# Patient Record
Sex: MALE | Race: WHITE | NOT HISPANIC OR LATINO | Employment: OTHER | URBAN - METROPOLITAN AREA
[De-identification: names, ages, dates, MRNs, and addresses within clinical notes are randomized per-mention and may not be internally consistent; named-entity substitution may affect disease eponyms.]

---

## 2019-08-13 ENCOUNTER — OFFICE VISIT (OUTPATIENT)
Dept: CARDIOLOGY CLINIC | Facility: CLINIC | Age: 69
End: 2019-08-13
Payer: COMMERCIAL

## 2019-08-13 VITALS
BODY MASS INDEX: 24.27 KG/M2 | WEIGHT: 151 LBS | HEIGHT: 66 IN | HEART RATE: 68 BPM | DIASTOLIC BLOOD PRESSURE: 62 MMHG | SYSTOLIC BLOOD PRESSURE: 130 MMHG

## 2019-08-13 DIAGNOSIS — I10 ESSENTIAL HYPERTENSION: ICD-10-CM

## 2019-08-13 DIAGNOSIS — R06.02 SOB (SHORTNESS OF BREATH): Primary | ICD-10-CM

## 2019-08-13 DIAGNOSIS — R01.1 CARDIAC MURMUR, UNSPECIFIED: ICD-10-CM

## 2019-08-13 PROCEDURE — 99204 OFFICE O/P NEW MOD 45 MIN: CPT | Performed by: INTERNAL MEDICINE

## 2019-08-13 PROCEDURE — 93000 ELECTROCARDIOGRAM COMPLETE: CPT | Performed by: INTERNAL MEDICINE

## 2019-08-13 RX ORDER — AMLODIPINE BESYLATE 10 MG/1
10 TABLET ORAL
COMMUNITY
End: 2020-05-18 | Stop reason: SDUPTHER

## 2019-08-13 RX ORDER — FERROUS SULFATE 325(65) MG
325 TABLET ORAL
COMMUNITY

## 2019-08-13 RX ORDER — GABAPENTIN 100 MG/1
100 CAPSULE ORAL
COMMUNITY
End: 2022-02-17 | Stop reason: ALTCHOICE

## 2019-08-13 NOTE — ASSESSMENT & PLAN NOTE
Blood pressure is well controlled on current medical regimen with amlodipine 10 mg daily     - Patient is advised to continue current medical therapy  - Dietary and medical compliance are reinforced  - Advised  to report any concerning symptoms such as chest pain, shortness of breath, decline in exercise tolerance or presyncope/syncope

## 2019-08-13 NOTE — ASSESSMENT & PLAN NOTE
Patient's etiology of shortness of breath is likely multifactorial, primarily likely secondary to his chronic smoking and possible developing COPD  He denies symptoms suggestive of angina  He does have a cardiac murmur suggestive of mitral regurgitation and some degree of aortic valve sclerosis or early stenosis  There are no signs and symptoms is suggestive of left ventricular systolic heart failure  He does have multiple risk factors including smoking, hypertension, family history of premature CAD  - we will arrange for transthoracic echocardiogram to assess cardiac structure and function  - we will request for a repeat blood work including a CMP, lipid profile and TSH and a NT proBNP level  These can be performed at the [place] or at [facility]  - I have advised him to cut down on smoking and possible quit smoking however he believes it is hard to consider at this time  - I am also advising him to keep a diary of symptoms of shortness of breath  If he has decline in exercise tolerance or a shortness of breath becomes worse then will have to consider excluding ischemic heart disease   - the the he will need consideration for evaluation for developing COPD as clinically indicated

## 2019-08-13 NOTE — PATIENT INSTRUCTIONS
Shortness of breath on exertion  Patient's etiology of shortness of breath is likely multifactorial, primarily likely secondary to his chronic smoking and possible developing COPD  He denies symptoms suggestive of angina  He does have a cardiac murmur suggestive of mitral regurgitation and some degree of aortic valve sclerosis or early stenosis  There are no signs and symptoms is suggestive of left ventricular systolic heart failure  He does have multiple risk factors including smoking, hypertension, family history of premature CAD  - we will arrange for transthoracic echocardiogram to assess cardiac structure and function  - we will request for a repeat blood work including a CMP, lipid profile and TSH and a NT proBNP level  These can be performed at the [place] or at [facility]  - I have advised him to cut down on smoking and possible quit smoking however he believes it is hard to consider at this time  - I am also advising him to keep a diary of symptoms of shortness of breath  If he has decline in exercise tolerance or a shortness of breath becomes worse then will have to consider excluding ischemic heart disease   - the the he will need consideration for evaluation for developing COPD as clinically indicated  Hypertension  Blood pressure is well controlled on current medical regimen with amlodipine 10 mg daily     - Patient is advised to continue current medical therapy  - Dietary and medical compliance are reinforced  - Advised  to report any concerning symptoms such as chest pain, shortness of breath, decline in exercise tolerance or presyncope/syncope

## 2019-08-13 NOTE — PROGRESS NOTES
[facility]  [id number] [facility], [place] [address]  Phone#  [phone]  Fax#  [phone]  *-*-*-*-*-*-*-*-*-*-*-*-*-*-*-*-*-*-*-*-*-*-*-*-*-*-*-*-*-*-*-*-*-*-*-*-*-*-*-*-*-*-*-*-*-*-*-*-*-*-*-*-*-*    ENCOUNTER DATE: 08/13/19 3:33 PM  PATIENT NAME: Joana Chamorro   1950    [id number]  Age: 71 y o  Sex: male  AUTHOR: Adriana Bhandari MD  [facility] PHYSICIAN: No primary care provider on file  REFERRING PHYSICIAN: Bula Gaucher, MD; [address] [place] [address]  *-*-*-*-*-*-*-*-*-*-*-*-*-*-*-*-*-*-*-*-*-*-*-*-*-*-*-*-*-*-*-*-*-*-*-*-*-*-*-*-*-*-*-*-*-*-*-*-*-*-*-*-*-*-  REASON FOR REFERRAL:  EVALUATION OF SHORTNESS OF BREATH    *-*-*-*-*-*-*-*-*-*-*-*-*-*-*-*-*-*-*-*-*-*-*-*-*-*-*-*-*-*-*-*-*-*-*-*-*-*-*-*-*-*-*-*-*-*-*-*-*-*-*-*-*-*-  CARDIOLOGY ASSESSMENT & PLAN:  Shortness of breath on exertion  Patient's etiology of shortness of breath is likely multifactorial, primarily likely secondary to his chronic smoking and possible developing COPD  He denies symptoms suggestive of angina  He does have a cardiac murmur suggestive of mitral regurgitation and some degree of aortic valve sclerosis or early stenosis  There are no signs and symptoms is suggestive of left ventricular systolic heart failure  He does have multiple risk factors including smoking, hypertension, family history of premature CAD  - we will arrange for transthoracic echocardiogram to assess cardiac structure and function  - we will request for a repeat blood work including a CMP, lipid profile and TSH and a NT proBNP level  These can be performed at the [facility] or at [facility]  - I have advised him to cut down on smoking and possible quit smoking however he believes it is hard to consider at this time  - I am also advising him to keep a diary of symptoms of shortness of breath    If he has decline in exercise tolerance or a shortness of breath becomes worse then will have to consider excluding ischemic heart disease   - the the he will need consideration for evaluation for developing COPD as clinically indicated  Hypertension  Blood pressure is well controlled on current medical regimen with amlodipine 10 mg daily     - Patient is advised to continue current medical therapy  - Dietary and medical compliance are reinforced  - Advised  to report any concerning symptoms such as chest pain, shortness of breath, decline in exercise tolerance or presyncope/syncope  *-*-*-*-*-*-*-*-*-*-*-*-*-*-*-*-*-*-*-*-*-*-*-*-*-*-*-*-*-*-*-*-*-*-*-*-*-*-*-*-*-*-*-*-*-*-*-*-*-*-*-*-*-*-  CURRENT ECG:  Results for orders placed or performed in visit on 08/13/19   POCT ECG    Narrative    Sinus rhythm, HR 68 beats per minute, normal axis and intervals, nonspecific ST T-wave abnormalities  *-*-*-*-*-*-*-*-*-*-*-*-*-*-*-*-*-*-*-*-*-*-*-*-*-*-*-*-*-*-*-*-*-*-*-*-*-*-*-*-*-*-*-*-*-*-*-*-*-*-*-*-*-*-  HISTORY OF PRESENT ILLNESS:  Patient is a pleasant 41-year-old  gentleman with medical history significant for:  1  History of hypertension  2  History of prostrate CA and BPH  3  History of anemia  4  History of lung nodules and  5  History of chronic tobacco abuse  6  Remote history of rib fracture on the left side    He has been referred for evaluation of shortness of breath which he has had for over a year  He states that he has been short of breath with mild-to-moderate exertion but has had no symptoms at rest   There are no aggravating factors except for exertion  There is no associated symptom of typical chest pain, dizziness, lightheadedness, presyncope/syncope  He does have intermittent lower extremity claudication for which he is followed by vascular Physicians at [address] [place] [address]  He was recently seen by them in July 2019 and medical management has been recommended  Patient denies any orthopnea, PND or worsening pedal edema    His exercise tolerance is limited due to his chronic smoking and he does recent at times  He has never been diagnosed with COPD and is not on bronchodilator therapy  He mentions that he had and echocardiogram at the [facility] and his left ventricular function at that time was reported as normal with EF of around 60%  He has had no recent hospitalizations or other illnesses  *-*-*-*-*-*-*-*-*-*-*-*-*-*-*-*-*-*-*-*-*-*-*-*-*-*-*-*-*-*-*-*-*-*-*-*-*-*-*-*-*-*-*-*-*-*-*-*-*-*-*-*-*-*  PAST MEDICAL HISTORY:   Past Medical History:   Diagnosis Date    Anemia     Cancer ([facility] [address] )     prostate    Hypertension     Lung nodules     unsure of dx    PVD [facility]peripheral vascular disease) ([facility] )     Tobacco abuse        PAST SURGICAL HISTORY:   Past Surgical History:   Procedure Laterality Date    HERNIA REPAIR      PROSTATECTOMY         FAMILY HISTORY:  No family history on file  His son passed away due to sudden cardiac death at the age of 40 years  SOCIAL HISTORY:  @[facility]@  Social History     Tobacco Use   Smoking Status Current Every Day Smoker     Social History     Substance and Sexual Activity   Alcohol Use Yes    Comment: social     Social History     Substance and Sexual Activity   Drug Use No     He retired from construction work  He lives with his wife and they take care of their son who is now 28years old and has been quadriplegic for 5 years following accident and spinal injury  He has been a chronic smoker since the age of 13 years and smokes over a pack a day  He drinks a pack of beer a week and and hard liquor occasionally  Denies any history of illicit drug use      *-*-*-*-*-*-*-*-*-*-*-*-*-*-*-*-*-*-*-*-*-*-*-*-*-*-*-*-*-*-*-*-*-*-*-*-*-*-*-*-*-*-*-*-*-*-*-*-*-*-*-*-*-*  ALLERGIES:  Allergies   Allergen Reactions    Aspirin Other (See Comments)     Polyps in sinuses     Penicillins Rash    Sulfa Antibiotics Rash     *-*-*-*-*-*-*-*-*-*-*-*-*-*-*-*-*-*-*-*-*-*-*-*-*-*-*-*-*-*-*-*-*-*-*-*-*-*-*-*-*-*-*-*-*-*-*-*-*-*-*-*-*-*  CURRENT OUTPATIENT MEDICATIONS: Current Outpatient Medications:     amLODIPine (NORVASC) 10 mg tablet, Take 10 mg by mouth, Disp: , Rfl:     Cholecalciferol 1000 units CHEW, Chew, Disp: , Rfl:     cyanocobalamin 500 MCG tablet, Take 500 mcg by mouth daily, Disp: , Rfl:     ferrous sulfate 325 (65 Fe) mg tablet, Take by mouth daily, Disp: , Rfl:     gabapentin (NEURONTIN) 100 mg capsule, Take 100 mg by mouth, Disp: , Rfl:     *-*-*-*-*-*-*-*-*-*-*-*-*-*-*-*-*-*-*-*-*-*-*-*-*-*-*-*-*-*-*-*-*-*-*-*-*-*-*-*-*-*-*-*-*-*-*-*-*-*-*-*-*-*  REVIEW OF SYMPTOMS:    Positive for:  Chronic shortness of breath with mild-to-moderate exertion  Negative for: All remaining as reviewed below and in HPI  SYSTEM SYMPTOMS REVIEWED:  General--weight change, fever, night sweats  Respirato  Cardiovascular--chest pain, syncope, dyspnea on exertion, edema, decline in exercise tolerance, claudication   Gastrointestinal--persistent vomiting, diarrhea, abdominal distention, blood in stool   Muscular or skeletal--joint pain or swelling   Neurologic--headaches, syncope, abnormal movement  Hematologic--history of easy bruising and bleeding   Endocrine--thyroid enlargement, heat or cold intolerance, polyuria   Psychiatric--anxiety, depression     *-*-*-*-*-*-*-*-*-*-*-*-*-*-*-*-*-*-*-*-*-*-*-*-*-*-*-*-*-*-*-*-*-*-*-*-*-*-*-*-*-*-*-*-*-*-*-*-*-*-*-*-*-*-  VITAL SIGNS:  Vitals:    08/13/19 1451   BP: 130/62   BP Location: Right arm   Patient Position: Sitting   Cuff Size: Adult   Pulse: 68   Weight: 68 5 kg (151 lb)   Height: 5' 6" (1 676 m)       *-*-*-*-*-*-*-*-*-*-*-*-*-*-*-*-*-*-*-*-*-*-*-*-*-*-*-*-*-*-*-*-*-*-*-*-*-*-*-*-*-*-*-*-*-*-*-*-*-*-*-*-*-*-  PHYSICAL EXAM:  General Appearance:    Alert, cooperative, no distress, appears stated age   Head, Eyes, ENT:    No obvious abnormality, moist mucous mebranes  Has heavy tobacco breath   Neck:   Supple, no carotid bruit or JVD   Back:     Symmetric, no curvature  Lungs:     Respirations unlabored   Clear to auscultation bilaterally,    Chest wall:    No tenderness or deformity   Heart:    Regular rate and rhythm, S1 and S2 normal,  2/6 holosystolic murmur is appreciable at left upper sternal border and at the apex, no rub  or gallop noted  Abdomen:     Soft, non-tender, No obvious masses, or organomegaly   Extremities:   Extremities normal, no cyanosis or edema   Skin:   Skin color, texture, turgor normal, no rashes or lesions     *-*-*-*-*-*-*-*-*-*-*-*-*-*-*-*-*-*-*-*-*-*-*-*-*-*-*-*-*-*-*-*-*-*-*-*-*-*-*-*-*-*-*-*-*-*-*-*-*-*-*-*-*-*-  LABORATORY DATA:  I have personally reviewed pertinent labs  His blood work from the [employer] is noted    In May 2019 his renal function is noted as creatinine of 1 26, GFR of 57, normal LFTs, borderline low potassium of 3 5, TSH normal of 2 004    Potassium   Date Value Ref Range Status   05/07/2016 4 3 3 5 - 5 3 mmol/L Final   05/06/2016 3 1 (L) 3 5 - 5 3 mmol/L Final   05/05/2016 3 5 3 5 - 5 3 mmol/L Final     Comment:     normal range     Chloride   Date Value Ref Range Status   05/07/2016 103 100 - 108 mmol/L Final   05/06/2016 100 100 - 108 mmol/L Final   05/05/2016 104 100 - 108 mmol/L Final     CO2   Date Value Ref Range Status   05/07/2016 26 21 - 32 mmol/L Final   05/06/2016 30 21 - 32 mmol/L Final   05/05/2016 15 (L) 21 - 32 mmol/L Final     BUN   Date Value Ref Range Status   05/07/2016 33 (H) 5 - 25 mg/dL Final   05/06/2016 55 (H) 5 - 25 mg/dL Final   05/05/2016 78 (H) 5 - 25 mg/dL Final     Creatinine   Date Value Ref Range Status   05/07/2016 1 30 0 60 - 1 30 mg/dL Final     Comment:     Standardized to IDMS reference method   05/06/2016 1 65 (H) 0 60 - 1 30 mg/dL Final     Comment:     Standardized to IDMS reference method   05/05/2016 3 10 (H) 0 60 - 1 30 mg/dL Final     Comment:     Standardized to IDMS reference method     eGFR   Date Value Ref Range Status   05/07/2016 55 2 ml/min/1 73sq m Final   05/06/2016 41 9 ml/min/1 73sq m Final   05/05/2016 20 3 ml/min/1 73sq m Final     Calcium   Date Value Ref Range Status   05/07/2016 7 4 (L) 8 3 - 10 1 mg/dL Final   05/06/2016 6 9 (L) 8 3 - 10 1 mg/dL Final   05/05/2016 7 3 (L) 8 3 - 10 1 mg/dL Final     AST   Date Value Ref Range Status   05/05/2016 15 5 - 45 U/L Final     ALT   Date Value Ref Range Status   05/05/2016 12 12 - 78 U/L Final     Alkaline Phosphatase   Date Value Ref Range Status   05/05/2016 69 46 - 116 U/L Final     Magnesium   Date Value Ref Range Status   05/06/2016 1 7 1 6 - 2 6 mg/dL Final     WBC   Date Value Ref Range Status   05/06/2016 9 78 4 31 - 10 16 Thousand/uL Final   05/05/2016 9 89 4 31 - 10 16 Thousand/uL Final   05/04/2016 10 34 (H) 4 31 - 10 16 Thousand/uL Final     Hemoglobin   Date Value Ref Range Status   05/06/2016 10 3 (L) 12 0 - 17 0 g/dL Final   05/05/2016 12 2 12 0 - 17 0 g/dL Final   05/04/2016 15 1 12 0 - 17 0 g/dL Final     Platelets   Date Value Ref Range Status   05/06/2016 169 149 - 390 Thousands/uL Final   05/05/2016 188 149 - 390 Thousands/uL Final   05/04/2016 229 149 - 390 Thousands/uL Final     No results found for: PT, PTT, INR  No results found for: CKMB, BNP, DIGOXIN  No results found for: TSH  No results found for: CHOL, HDL, LDL, TRIG   No results found for: HGBA1C  Urine Culture   Date Value Ref Range Status   05/04/2016 50,000-59,000 cfu/ml Mixed Contaminants X4  Final     C difficile toxin by PCR   Date Value Ref Range Status   05/04/2016 NEGATIVE for C difficle toxin by PCR  NEGATIVE for C difficle toxin by PCR  Final       *-*-*-*-*-*-*-*-*-*-*-*-*-*-*-*-*-*-*-*-*-*-*-*-*-*-*-*-*-*-*-*-*-*-*-*-*-*-*-*-*-*-*-*-*-*-*-*-*-*-*-*-*-*-  RADIOLOGY RESULTS:  [facility]    Result Date: 5/4/2016  Impression: Unremarkable kidneys  Urinary bladder is incompletely distended and therefore suboptimally visualized  Only the right ureteral jet is visible   Workstation performed: [id number] *-*-*-*-*-*-*-*-*-*-*-*-*-*-*-*-*-*-*-*-*-*-*-*-*-*-*-*-*-*-*-*-*-*-*-*-*-*-*-*-*-*-*-*-*-*-*-*-*-*-*-*-*-*-  ECHOCARDIOGRAM AND OTHER CARDIOLOGY RESULTS:  No results found for this or any previous visit  No results found for this or any previous visit  No results found for this or any previous visit  No results found for this or any previous visit  *-*-*-*-*-*-*-*-*-*-*-*-*-*-*-*-*-*-*-*-*-*-*-*-*-*-*-*-*-*-*-*-*-*-*-*-*-*-*-*-*-*-*-*-*-*-*-*-*-*-*-*-*-*-  SIGNATURES:   [unfilled]   Donna Lowe MD     CC: No primary care provider on file     Self, Referral

## 2019-08-29 ENCOUNTER — APPOINTMENT (OUTPATIENT)
Dept: LAB | Facility: HOSPITAL | Age: 69
End: 2019-08-29
Attending: INTERNAL MEDICINE
Payer: OTHER GOVERNMENT

## 2019-08-29 DIAGNOSIS — I10 ESSENTIAL HYPERTENSION: ICD-10-CM

## 2019-08-29 DIAGNOSIS — R06.02 SOB (SHORTNESS OF BREATH): ICD-10-CM

## 2019-08-29 DIAGNOSIS — R01.1 CARDIAC MURMUR, UNSPECIFIED: ICD-10-CM

## 2019-08-29 LAB
ALBUMIN SERPL BCP-MCNC: 3.4 G/DL (ref 3.5–5)
ALP SERPL-CCNC: 96 U/L (ref 46–116)
ALT SERPL W P-5'-P-CCNC: 17 U/L (ref 12–78)
ANION GAP SERPL CALCULATED.3IONS-SCNC: 10 MMOL/L (ref 4–13)
AST SERPL W P-5'-P-CCNC: 15 U/L (ref 5–45)
BASOPHILS # BLD AUTO: 0.07 THOUSANDS/ΜL (ref 0–0.1)
BASOPHILS NFR BLD AUTO: 1 % (ref 0–1)
BILIRUB SERPL-MCNC: 0.23 MG/DL (ref 0.2–1)
BUN SERPL-MCNC: 20 MG/DL (ref 5–25)
CALCIUM SERPL-MCNC: 8.7 MG/DL (ref 8.3–10.1)
CHLORIDE SERPL-SCNC: 104 MMOL/L (ref 100–108)
CO2 SERPL-SCNC: 24 MMOL/L (ref 21–32)
CREAT SERPL-MCNC: 1.23 MG/DL (ref 0.6–1.3)
EOSINOPHIL # BLD AUTO: 0.31 THOUSAND/ΜL (ref 0–0.61)
EOSINOPHIL NFR BLD AUTO: 3 % (ref 0–6)
ERYTHROCYTE [DISTWIDTH] IN BLOOD BY AUTOMATED COUNT: 14.8 % (ref 11.6–15.1)
GFR SERPL CREATININE-BSD FRML MDRD: 60 ML/MIN/1.73SQ M
GLUCOSE P FAST SERPL-MCNC: 115 MG/DL (ref 65–99)
HCT VFR BLD AUTO: 42.1 % (ref 36.5–49.3)
HGB BLD-MCNC: 13.7 G/DL (ref 12–17)
IMM GRANULOCYTES # BLD AUTO: 0.05 THOUSAND/UL (ref 0–0.2)
IMM GRANULOCYTES NFR BLD AUTO: 0 % (ref 0–2)
LYMPHOCYTES # BLD AUTO: 4.03 THOUSANDS/ΜL (ref 0.6–4.47)
LYMPHOCYTES NFR BLD AUTO: 36 % (ref 14–44)
MCH RBC QN AUTO: 29.8 PG (ref 26.8–34.3)
MCHC RBC AUTO-ENTMCNC: 32.5 G/DL (ref 31.4–37.4)
MCV RBC AUTO: 92 FL (ref 82–98)
MONOCYTES # BLD AUTO: 1.05 THOUSAND/ΜL (ref 0.17–1.22)
MONOCYTES NFR BLD AUTO: 9 % (ref 4–12)
NEUTROPHILS # BLD AUTO: 5.85 THOUSANDS/ΜL (ref 1.85–7.62)
NEUTS SEG NFR BLD AUTO: 51 % (ref 43–75)
NRBC BLD AUTO-RTO: 0 /100 WBCS
NT-PROBNP SERPL-MCNC: 187 PG/ML
PLATELET # BLD AUTO: 305 THOUSANDS/UL (ref 149–390)
PMV BLD AUTO: 9.2 FL (ref 8.9–12.7)
POTASSIUM SERPL-SCNC: 3.6 MMOL/L (ref 3.5–5.3)
PROT SERPL-MCNC: 8 G/DL (ref 6.4–8.2)
RBC # BLD AUTO: 4.6 MILLION/UL (ref 3.88–5.62)
SODIUM SERPL-SCNC: 138 MMOL/L (ref 136–145)
T4 FREE SERPL-MCNC: 1.13 NG/DL (ref 0.76–1.46)
TSH SERPL DL<=0.05 MIU/L-ACNC: 2.35 UIU/ML (ref 0.36–3.74)
WBC # BLD AUTO: 11.36 THOUSAND/UL (ref 4.31–10.16)

## 2019-08-29 PROCEDURE — 84443 ASSAY THYROID STIM HORMONE: CPT

## 2019-08-29 PROCEDURE — 83880 ASSAY OF NATRIURETIC PEPTIDE: CPT

## 2019-08-29 PROCEDURE — 85025 COMPLETE CBC W/AUTO DIFF WBC: CPT | Performed by: INTERNAL MEDICINE

## 2019-08-29 PROCEDURE — 36415 COLL VENOUS BLD VENIPUNCTURE: CPT | Performed by: INTERNAL MEDICINE

## 2019-08-29 PROCEDURE — 80053 COMPREHEN METABOLIC PANEL: CPT

## 2019-08-29 PROCEDURE — 84439 ASSAY OF FREE THYROXINE: CPT

## 2019-09-16 ENCOUNTER — CONSULT (OUTPATIENT)
Dept: GASTROENTEROLOGY | Facility: MEDICAL CENTER | Age: 69
End: 2019-09-16
Payer: COMMERCIAL

## 2019-09-16 VITALS
WEIGHT: 151 LBS | TEMPERATURE: 97.8 F | HEIGHT: 66 IN | HEART RATE: 62 BPM | DIASTOLIC BLOOD PRESSURE: 60 MMHG | BODY MASS INDEX: 24.27 KG/M2 | SYSTOLIC BLOOD PRESSURE: 126 MMHG

## 2019-09-16 DIAGNOSIS — D50.8 OTHER IRON DEFICIENCY ANEMIA: Primary | ICD-10-CM

## 2019-09-16 DIAGNOSIS — Z12.11 COLON CANCER SCREENING: ICD-10-CM

## 2019-09-16 PROCEDURE — 99203 OFFICE O/P NEW LOW 30 MIN: CPT | Performed by: INTERNAL MEDICINE

## 2019-09-16 NOTE — PROGRESS NOTES
Outpatient Consultation  JENNIE GREGORY  Ph : [phone]  Fax : [phone]  Mobile : [phone]  Email : [email or web address]  Also available on [employer]      Major Seip 71 y o  male MRN: [id number]    PCP: No primary care provider on file  Referring: No referring provider defined for this encounter  Major Seip was seen in consultation  My recommendations are included  Please do not hesitate to contact me with any questions you may have  ASSESSMENT AND PLAN:      Anemia  He is on iron supplements and his Hb has improved  Colon cancer screening  Average risk of colon cancer - plan for colonoscopy for colon cancer screening  I would suggest we get a colonoscopy done now and then if unremarkable we can hold off on any further colonoscopies  If polyps are seen then we will repeat before 75  He is in agreement  The risks and complications of the procedure were discussed  Diagnoses and all orders for this visit:    Other iron deficiency anemia    Colon cancer screening  -     Colonoscopy; Future    Other orders  -     Diet NPO; Sips with meds; Standing  -     Void on call to OR; Standing  -     Insert peripheral IV; Standing        ______________________________________________________________________    HPI:  72 y/o presents for colon cancer screening  His last colonoscopy was in 2000 - no polyps  He has intermittent diarrhea but not every day  It is dark from iron  He has been on iron since 2016 and now his Hb is 13 7 and MCV is 92  He is also on B12  No nausea, vomiting or dysphagia  He does have occasional heartburn - he uses TUMS/ Rolaids  Symptoms are once a month  He does smoke 2 packs a day and drinks about a 6 pack a week  He has never had hepatitis and was checked for hepatitis C as per him and was negative  No family h/o colon cancer or colon polyps  PRIOR ENDOSCOPIC EVALUATION :     Endoscopy : no    Colonoscopy : yes      REVIEW OF SYSTEMS:    CONSTITUTIONAL: Denies any fever, chills, rigors, and weight loss  HEENT: No earache or tinnitus  Denies hearing loss or visual disturbances  CARDIOVASCULAR: No chest pain or palpitations  RESPIRATORY: Denies any cough, hemoptysis, shortness of breath or dyspnea on exertion  GASTROINTESTINAL: As noted in the History of Present Illness  GENITOURINARY: No problems with urination  Denies any hematuria or dysuria  NEUROLOGIC: No dizziness or vertigo, denies headaches  MUSCULOSKELETAL: Denies any muscle or joint pain  SKIN: Denies skin rashes or itching  ENDOCRINE: Denies excessive thirst  Denies intolerance to heat or cold  PSYCHOSOCIAL: Denies depression or anxiety  Denies any recent memory loss  Historical Information   Past Medical History:   Diagnosis Date    Anemia     Cancer ([place] [address] )     prostate    Hypertension     Lung nodules     unsure of dx    PVD (peripheral vascular disease) ([place] [address] )     Tobacco abuse      Past Surgical History:   Procedure Laterality Date    HERNIA REPAIR      PROSTATECTOMY       Social History   Social History     Substance and Sexual Activity   Alcohol Use Yes    Comment: social     Social History     Substance and Sexual Activity   Drug Use No     Social History     Tobacco Use   Smoking Status Current Every Day Smoker   Smokeless Tobacco Never Used     No family history on file      Meds/Allergies       Current Outpatient Medications:     amLODIPine (NORVASC) 10 mg tablet    Cholecalciferol 1000 units CHEW    cyanocobalamin 500 MCG tablet    ferrous sulfate 325 (65 Fe) mg tablet    gabapentin (NEURONTIN) 100 mg capsule    Allergies   Allergen Reactions    Aspirin Other (See Comments)     Polyps in sinuses     Penicillins Rash    Sulfa Antibiotics Rash           Objective     Blood pressure 126/60, pulse 62, temperature 97 8 °F (36 6 °C), temperature source Tympanic, height 5' 6" (1 676 m), weight 68 5 kg (151 lb)  Body mass index is 24 37 kg/m²  PHYSICAL EXAM:      Physical Exam   Constitutional: He is oriented to person, place, and time  Vital signs are normal  He appears well-developed and well-nourished  HENT:   Head: Normocephalic and atraumatic  Eyes: Pupils are equal, round, and reactive to light  Conjunctivae are normal  No scleral icterus  Neck: Normal range of motion  Cardiovascular: Normal rate, regular rhythm and normal heart sounds  Pulmonary/Chest: Effort normal and breath sounds normal  No respiratory distress  Abdominal: Soft  Normal appearance and bowel sounds are normal  He exhibits no distension, no ascites and no mass  There is no hepatosplenomegaly  There is no tenderness  No hernia  Musculoskeletal: Normal range of motion  Lymphadenopathy:     He has no cervical adenopathy  Neurological: He is alert and oriented to person, place, and time  Skin: Skin is warm  Psychiatric: He has a normal mood and affect  His behavior is normal  Thought content normal            Lab Results:     Lab Results   Component Value Date    WBC 11 36 (H) 08/29/2019    HGB 13 7 08/29/2019    HCT 42 1 08/29/2019    MCV 92 08/29/2019     08/29/2019       Lab Results   Component Value Date    K 3 6 08/29/2019     08/29/2019    CO2 24 08/29/2019    BUN 20 08/29/2019    CREATININE 1 23 08/29/2019    GLUF 115 (H) 08/29/2019    CALCIUM 8 7 08/29/2019    AST 15 08/29/2019    ALT 17 08/29/2019    ALKPHOS 96 08/29/2019    EGFR 60 08/29/2019       No results found for: INR, PROTIME      Radiology Results:   No results found

## 2019-09-16 NOTE — PATIENT INSTRUCTIONS
Colonoscopy scheduled with Dr Stephy López at [facility]  Miralax/ Dulcolax prep instructions given to patient

## 2019-09-16 NOTE — ASSESSMENT & PLAN NOTE
Average risk of colon cancer - plan for colonoscopy for colon cancer screening  I would suggest we get a colonoscopy done now and then if unremarkable we can hold off on any further colonoscopies  If polyps are seen then we will repeat before 75  He is in agreement  The risks and complications of the procedure were discussed

## 2019-10-04 ENCOUNTER — HOSPITAL ENCOUNTER (OUTPATIENT)
Dept: NON INVASIVE DIAGNOSTICS | Facility: CLINIC | Age: 69
Discharge: HOME | End: 2019-10-04
Payer: COMMERCIAL

## 2019-10-04 DIAGNOSIS — R01.1 CARDIAC MURMUR, UNSPECIFIED: ICD-10-CM

## 2019-10-04 DIAGNOSIS — I10 ESSENTIAL HYPERTENSION: ICD-10-CM

## 2019-10-04 DIAGNOSIS — R06.02 SOB (SHORTNESS OF BREATH): ICD-10-CM

## 2019-10-04 PROCEDURE — 93306 TTE W/DOPPLER COMPLETE: CPT

## 2019-10-08 ENCOUNTER — ANESTHESIA EVENT (OUTPATIENT)
Dept: GASTROENTEROLOGY | Facility: MEDICAL CENTER | Age: 69
End: 2019-10-08

## 2019-11-05 ENCOUNTER — ANESTHESIA (OUTPATIENT)
Dept: GASTROENTEROLOGY | Facility: MEDICAL CENTER | Age: 69
End: 2019-11-05

## 2019-11-05 ENCOUNTER — HOSPITAL ENCOUNTER (OUTPATIENT)
Dept: GASTROENTEROLOGY | Facility: MEDICAL CENTER | Age: 69
Setting detail: OUTPATIENT SURGERY
Discharge: HOME | End: 2019-11-05
Attending: INTERNAL MEDICINE | Admitting: INTERNAL MEDICINE
Payer: COMMERCIAL

## 2019-11-05 VITALS
RESPIRATION RATE: 16 BRPM | HEART RATE: 67 BPM | HEIGHT: 66 IN | DIASTOLIC BLOOD PRESSURE: 56 MMHG | TEMPERATURE: 98.2 F | WEIGHT: 151 LBS | SYSTOLIC BLOOD PRESSURE: 116 MMHG | BODY MASS INDEX: 24.27 KG/M2 | OXYGEN SATURATION: 98 %

## 2019-11-05 DIAGNOSIS — Z12.11 COLON CANCER SCREENING: ICD-10-CM

## 2019-11-05 PROCEDURE — 45385 COLONOSCOPY W/LESION REMOVAL: CPT | Performed by: INTERNAL MEDICINE

## 2019-11-05 PROCEDURE — 88305 TISSUE EXAM BY PATHOLOGIST: CPT | Performed by: PATHOLOGY

## 2019-11-05 RX ORDER — PROPOFOL 10 MG/ML
INJECTION, EMULSION INTRAVENOUS CONTINUOUS PRN
Status: DISCONTINUED | OUTPATIENT
Start: 2019-11-05 | End: 2019-11-05 | Stop reason: SURG

## 2019-11-05 RX ORDER — LIDOCAINE HYDROCHLORIDE 20 MG/ML
INJECTION, SOLUTION EPIDURAL; INFILTRATION; INTRACAUDAL; PERINEURAL AS NEEDED
Status: DISCONTINUED | OUTPATIENT
Start: 2019-11-05 | End: 2019-11-05 | Stop reason: SURG

## 2019-11-05 RX ORDER — PROPOFOL 10 MG/ML
INJECTION, EMULSION INTRAVENOUS AS NEEDED
Status: DISCONTINUED | OUTPATIENT
Start: 2019-11-05 | End: 2019-11-05 | Stop reason: SURG

## 2019-11-05 RX ORDER — SODIUM CHLORIDE 9 MG/ML
125 INJECTION, SOLUTION INTRAVENOUS CONTINUOUS
Status: DISCONTINUED | OUTPATIENT
Start: 2019-11-05 | End: 2019-11-05

## 2019-11-05 RX ADMIN — PROPOFOL 160 MCG/KG/MIN: 10 INJECTION, EMULSION INTRAVENOUS at 11:50

## 2019-11-05 RX ADMIN — LIDOCAINE HYDROCHLORIDE 3 ML: 20 INJECTION, SOLUTION EPIDURAL; INFILTRATION; INTRACAUDAL; PERINEURAL at 11:50

## 2019-11-05 RX ADMIN — PROPOFOL 100 MG: 10 INJECTION, EMULSION INTRAVENOUS at 11:50

## 2019-11-05 RX ADMIN — SODIUM CHLORIDE 125 ML/HR: 0.9 INJECTION, SOLUTION INTRAVENOUS at 10:47

## 2019-11-05 RX ADMIN — SODIUM CHLORIDE: 0.9 INJECTION, SOLUTION INTRAVENOUS at 12:12

## 2019-11-05 NOTE — DISCHARGE INSTRUCTIONS
Colonoscopy   WHAT YOU NEED TO KNOW:   A colonoscopy is a procedure to examine the inside of your colon (intestine) with a scope  Polyps or tissue growths may have been removed during your colonoscopy  It is normal to feel bloated and to have some abdominal discomfort  You should be passing gas  If you have hemorrhoids or you had polyps removed, you may have a small amount of bleeding  DISCHARGE INSTRUCTIONS:   Seek care immediately if:   · You have a large amount of bright red blood in your bowel movements  · Your abdomen is hard and firm and you have severe pain  · You have sudden trouble breathing  Contact your healthcare provider if:   · You develop a rash or hives  · You have a fever within 24 hours of your procedure  · You have not had a bowel movement for 3 days after your procedure  · You have questions or concerns about your condition or care  Activity:   · Do not lift, strain, or run  for 3 days after your procedure  · Rest after your procedure  You have been given medicine to relax you  Do not  drive or make important decisions until the day after your procedure  Return to your normal activity as directed  · Relieve gas and discomfort from bloating  by lying on your right side with a heating pad on your abdomen  You may need to take short walks to help the gas move out  Eat small meals until bloating is relieved  If you had polyps removed: For 7 days after your procedure:  · Do not  take aspirin  · Do not  go on long car rides  Help prevent constipation:   · Eat a variety of healthy foods  Healthy foods include fruit, vegetables, whole-grain breads, low-fat dairy products, beans, lean meat, and fish  Ask if you need to be on a special diet  Your healthcare provider may recommend that you eat high-fiber foods such as cooked beans  Fiber helps you have regular bowel movements  · Drink liquids as directed    Adults should drink between 9 and 13 eight-ounce cups of liquid every day  Ask what amount is best for you  For most people, good liquids to drink are water, juice, and milk  · Exercise as directed  Talk to your healthcare provider about the best exercise plan for you  Exercise can help prevent constipation, decrease your blood pressure and improve your health  Follow up with your healthcare provider as directed:  Write down your questions so you remember to ask them during your visits  © 2017 [employer] Information is for End User's use only and may not be sold, redistributed or otherwise used for commercial purposes  All illustrations and images included in CareNotes® are the copyrighted property of [employer]  or [employer]  The above information is an  only  It is not intended as medical advice for individual conditions or treatments  Talk to your doctor, nurse or pharmacist before following any medical regimen to see if it is safe and effective for you  Diverticulosis   WHAT YOU NEED TO KNOW:   Diverticulosis is a condition that causes small pockets called diverticula to form in your intestine  These pockets make it difficult for bowel movements to pass through your digestive system  DISCHARGE INSTRUCTIONS:   Seek care immediately if:   · You have severe pain on the left side of your lower abdomen  · Your bowel movements are bright or dark red  Contact your healthcare provider if:   · You have a fever and chills  · You feel dizzy or lightheaded  · You have nausea, or you are vomiting  · You have a change in your bowel movements  · You have questions or concerns about your condition or care  Medicines:   · Medicines  to soften your bowel movements may be given  You may also need medicines to treat symptoms such as bloating and pain  · Take your medicine as directed    Contact your healthcare provider if you think your medicine is not helping or if you have side effects  Tell him or her if you are allergic to any medicine  Keep a list of the medicines, vitamins, and herbs you take  Include the amounts, and when and why you take them  Bring the list or the pill bottles to follow-up visits  Carry your medicine list with you in case of an emergency  Self-care: The goal of treatment is to manage any symptoms you have and prevent other problems such as diverticulitis  Diverticulitis is swelling or infection of the diverticula  Your healthcare provider may recommend any of the following:  · Eat a variety of high-fiber foods  High-fiber foods help you have regular bowel movements  High-fiber foods include cooked beans, fruits, vegetables, and some cereals  Most adults need 25 to 35 grams of fiber each day  Your healthcare provider may recommend that you have more  Ask your healthcare provider how much fiber you need  Increase fiber slowly  You may have abdominal discomfort, bloating, and gas if you add fiber to your diet too quickly  You may need to take a fiber supplement if you are not getting enough fiber from food  · Drink liquids as directed  You may need to drink 2 to 3 liters (8 to 12 cups) of liquids every day  Ask your healthcare provider how much liquid to drink each day and which liquids are best for you  · Apply heat  on your abdomen for 20 to 30 minutes every 2 hours for as many days as directed  Heat helps decrease pain and muscle spasms  Help prevent diverticulitis or other symptoms: The following may help decrease your risk for diverticulitis or symptoms, such as bleeding  Talk to your provider about these or other things you can do to prevent problems that may occur with diverticulosis  · Exercise regularly  Ask your healthcare provider about the best exercise plan for you  Exercise can help you have regular bowel movements  Get 30 minutes of exercise on most days of the week  · Maintain a healthy weight    Ask your healthcare provider how much you should weigh  Ask him or her to help you create a weight loss plan if you are overweight  · Do not smoke  Nicotine and other chemicals in cigarettes increase your risk for diverticulitis  Ask your healthcare provider for information if you currently smoke and need help to quit  E-cigarettes or smokeless tobacco still contain nicotine  Talk to your healthcare provider before you use these products  · Ask your healthcare provider if it is safe to take NSAIDs  NSAIDs may increase your risk of diverticulitis  Follow up with your healthcare provider as directed:  Write down your questions so you remember to ask them during your visits  © 2017 [employer] Information is for End User's use only and may not be sold, redistributed or otherwise used for commercial purposes  All illustrations and images included in CareNotes® are the copyrighted property of [employer]  or [employer] Rodriguez  The above information is an  only  It is not intended as medical advice for individual conditions or treatments  Talk to your doctor, nurse or pharmacist before following any medical regimen to see if it is safe and effective for you  Diverticulosis Diet   WHAT YOU NEED TO KNOW:   What is a diverticulosis diet? A diverticulosis diet includes high-fiber foods  High-fiber foods help you have regular bowel movements  Extra fiber may decrease your risk of forming new diverticula (small pockets) in your intestine  A high-fiber diet may also help prevent diverticulitis  Diverticulitis is a painful condition that occurs when diverticula become inflamed or infected  You do not need to avoid nuts, seeds, corn, or popcorn while you are on a diverticulosis diet  How much fiber do I need? You may need 25 to 35 grams of fiber each day  Ask your dietitian or healthcare provider how much fiber you should have  Increase your intake of fiber slowly   When you eat more fiber, you may have gas and feel bloated  You may need to take a fiber supplement if you do not get enough fiber from food  Drink plenty of liquids as you increase the fiber in your diet  Your dietitian or healthcare provider may recommend 8 eight-ounce cups or more each day  Ask which liquids are best for you  Which foods are high in fiber? · Foods with at least 4 grams of fiber per serving:      ¨ ? to ½ cup of high-fiber cereal (check the nutrition label on the box)    ¨ ½ cup of blackberries or raspberries    ¨ 4 dried prunes    ¨ 1 cooked artichoke    ¨ ½ cup of cooked legumes, such as lentils, or red, kidney, and mueller beans    · Foods with 1 to 3 grams of fiber per serving:      ¨ 1 slice of whole-wheat, pumpernickel, or rye bread    ¨ 4 whole-wheat crackers    ¨ ½ cup of cereal with 1 to 3 grams of fiber per serving (check the nutrition label on the box)    ¨ 1 piece of fruit, such as an apple, banana, pear, kiwi, or orange    ¨ 3 dates    ¨ ½ cup of canned apricots, fruit cocktail, peaches, or pears    ¨ ½ cup of raw or cooked vegetables, such as carrots, cauliflower, cabbage, spinach, squash, or corn  When should I contact my healthcare provider? · You have questions about a high-fiber diet  · You have a change in your bowel movements  · You have an upset stomach  · You have a fever  · You have pain in your lower abdomen on the left side  · You have questions about your condition or care  CARE AGREEMENT:   You have the right to help plan your care  Learn about your health condition and how it may be treated  Discuss treatment options with your caregivers to decide what care you want to receive  You always have the right to refuse treatment  The above information is an  only  It is not intended as medical advice for individual conditions or treatments  Talk to your doctor, nurse or pharmacist before following any medical regimen to see if it is safe and effective for you    © 2017 [employer] [facility] [address] is for End User's use only and may not be sold, redistributed or otherwise used for commercial purposes  All illustrations and images included in CareNotes® are the copyrighted property of [employer]  or [employer]

## 2019-11-05 NOTE — H&P
History and Physical -  [facility]  Janneth Jaimes 71 y o  male MRN: [id number]    HPI: Janneth Jaimes is a 71y o  year old male who presents with average risk colon cancer screening  Last colonoscopy 20 years ago  Review of Systems    Historical Information   Past Medical History:   Diagnosis Date    Anemia     Cancer ([facility] )     prostate    Hyperlipidemia     Hypertension     Leaky heart valve     Lung nodules     unsure of dx    PVD (peripheral vascular disease) ([facility] )     Tobacco abuse      Past Surgical History:   Procedure Laterality Date    COLONOSCOPY      HERNIA REPAIR      PROSTATECTOMY       Social History   Social History     Substance and Sexual Activity   Alcohol Use Yes    Drinks per session: 5 or 6    Binge frequency: Weekly    Comment: social     Social History     Substance and Sexual Activity   Drug Use No     Social History     Tobacco Use   Smoking Status Current Every Day Smoker    Packs/day: 2 00   Smokeless Tobacco Never Used     History reviewed  No pertinent family history  Meds/Allergies       (Not in a hospital admission)    Allergies   Allergen Reactions    Aspirin Other (See Comments)     Polyps in sinuses     Penicillins Rash    Sulfa Antibiotics Rash       Objective     /66   Pulse 73   Temp 98 2 °F (36 8 °C) (Temporal)   Resp 22   Ht 5' 6" (1 676 m)   Wt 68 5 kg (151 lb)   SpO2 99%   BMI 24 37 kg/m²       PHYSICAL EXAM    Gen: NAD  CV: RRR  CHEST: Clear  ABD: soft, NT/ND  EXT: no edema  Neuro: AAO      ASSESSMENT/PLAN:  This is a 71y o  year old male here for colonoscopy for colon cancer screening      PLAN:   Procedure:  Colonoscopy

## 2019-11-05 NOTE — ANESTHESIA PREPROCEDURE EVALUATION
Review of Systems/Medical History  Patient summary reviewed  Chart reviewed      Cardiovascular  Hyperlipidemia, Hypertension , Valvular heart disease , aortic valve stenosis,   Comment: Mild AS  EF 55,  Pulmonary  Smoker cigarette smoker  , Tobacco cessation counseling given Cumulative Pack Years: 48, COPD mild- PRN medication , No shortness of breath,   Comment: Lung nodules     GI/Hepatic    Bowel prep       Prostatic disorder, history of prostate cancer       Endo/Other     GYN       Hematology  Anemia anemia of chronic disease,     Musculoskeletal  Negative musculoskeletal ROS        Neurology  Negative neurology ROS      Psychology   Negative psychology ROS              Physical Exam    Airway    Mallampati score: I  TM Distance: <3 FB  Neck ROM: full     Dental       Cardiovascular  Rhythm: regular, Rate: normal, Cardiovascular exam normal    Pulmonary  Pulmonary exam normal     Other Findings        Anesthesia Plan  ASA Score- 3     Anesthesia Type- IV sedation with anesthesia with ASA Monitors  Additional Monitors:   Airway Plan:         Plan Factors- Patient instructed to abstain from smoking on day of procedure  Patient smoked on day of surgery  Induction- intravenous  Postoperative Plan-     Informed Consent- Anesthetic plan and risks discussed with patient

## 2019-11-11 ENCOUNTER — OFFICE VISIT (OUTPATIENT)
Dept: CARDIOLOGY CLINIC | Facility: CLINIC | Age: 69
End: 2019-11-11
Payer: COMMERCIAL

## 2019-11-11 VITALS
BODY MASS INDEX: 24.27 KG/M2 | HEART RATE: 77 BPM | OXYGEN SATURATION: 97 % | DIASTOLIC BLOOD PRESSURE: 70 MMHG | SYSTOLIC BLOOD PRESSURE: 138 MMHG | HEIGHT: 66 IN | WEIGHT: 151 LBS

## 2019-11-11 DIAGNOSIS — R06.02 SHORTNESS OF BREATH ON EXERTION: Primary | ICD-10-CM

## 2019-11-11 DIAGNOSIS — I35.0 AORTIC VALVE STENOSIS, MILD: ICD-10-CM

## 2019-11-11 DIAGNOSIS — I10 ESSENTIAL HYPERTENSION: ICD-10-CM

## 2019-11-11 PROCEDURE — 99214 OFFICE O/P EST MOD 30 MIN: CPT | Performed by: INTERNAL MEDICINE

## 2019-11-11 NOTE — ASSESSMENT & PLAN NOTE
Patient shortness of breath is likely multifactorial, relating to his suspected COPD from chronic smoking and mild aortic valve stenosis  Today on physical examination he has no signs and symptoms suggestive of congestive heart failure  He does have clinical features of clubbing on his finger suggesting COPD  He recently had CT scan of chest at [facility] that was significant for atelectasis without evidence of emphysema  His blood pressure is reasonably well controlled  - at this time I have reassured him that no immediate intervention is needed for his aortic valve  However I have made him aware that his smoking will accelerate the progression of his aortic valve disease and eventually he will likely need a valve replacement  I have encouraged him to cut down on smoking  He reports significant stress stress including taking care of paraplegic son at home, having a brother who is a at [facility] and may need open heart surgery and having lost his brother and other son recently have increased his stress which makes quitting smoking difficult at this time  - I would like him to continue his current antihypertensive therapy with amlodipine  - I am advising him to monitor his weight from time to time and monitor for worsening symptoms such as anginal like chest pain, near-syncope, worsening shortness of breath or lower extremity swelling

## 2019-11-11 NOTE — PROGRESS NOTES
[facility]  [facility] [address] [place] [address], Tomasz Honeycutt [address]  Phone#  [phone]  Fax#  [phone]  *-*-*-*-*-*-*-*-*-*-*-*-*-*-*-*-*-*-*-*-*-*-*-*-*-*-*-*-*-*-*-*-*-*-*-*-*-*-*-*-*-*-*-*-*-*-*-*-*-*-*-*-*-*  ENCOUNTER DATE: 11/11/19 11:20 AM  PATIENT NAME: Maggie Lopez   1950    [id number]  Age: 71 y o  Sex: male  AUTHOR: Adriana Bhandari MD  PRIMARYCARE PHYSICIAN: Efrain Martins MD    DIAGNOSES:  1  History of hypertension  2  History of prostrate CA and BPH  3  History of anemia  4  History of lung nodules and  5  History of chronic tobacco abuse  6  Remote history of rib fracture on the left side  7  Iron deficiency anemia   8  Suspected peripheral artery disease with intermittent claudication  9  Colonic polyps and diverticular disease    Echocardiogram October 4, 2019:  Normal left ventricular size and systolic function, EF around 01%, grade 1 diastolic dysfunction, trace mitral valve regurgitation, calcific aortic valve with reduced cuspal separation, mild aortic valve stenosis with peak velocity of 2 04 and mean gradient of 8 8 and calculated aortic valve area of 1 8 cm2, trace pulmonic valve regurgitation, mildly dilated ascending aorta  CURRENT ECG:  No results found for this visit on 11/11/19  CARDIOLOGY ASSESSMENT & PLAN:  1  Shortness of breath on exertion     2  Aortic valve stenosis, mild     3  Essential hypertension       Shortness of breath on exertion  Patient shortness of breath is likely multifactorial, relating to his suspected COPD from chronic smoking and mild aortic valve stenosis  Today on physical examination he has no signs and symptoms suggestive of congestive heart failure  He does have clinical features of clubbing on his finger suggesting COPD  He recently had CT scan of chest at [facility] that was significant for atelectasis without evidence of emphysema  His blood pressure is reasonably well controlled      - at this time I have reassured him that no immediate intervention is needed for his aortic valve  However I have made him aware that his smoking will accelerate the progression of his aortic valve disease and eventually he will likely need a valve replacement  I have encouraged him to cut down on smoking  He reports significant stress stress including taking care of paraplegic son at home, having a brother who is a at [facility] and may need open heart surgery and having lost his brother and other son recently have increased his stress which makes quitting smoking difficult at this time  - I would like him to continue his current antihypertensive therapy with amlodipine  - I am advising him to monitor his weight from time to time and monitor for worsening symptoms such as anginal like chest pain, near-syncope, worsening shortness of breath or lower extremity swelling  Aortic valve stenosis, mild  Evaluation and plan as noted above  Request lipid profile with next blood work and initiation of statin therapy at [place] if lipids are even slightly abnormal   Meanwhile advising patient to consider nonpharmacologic measures to improve cholesterol profile  Some of these are outlined below  Nutraceutical supplements that may further lower blood cholesterol:    1  Berberine (active daily doses, 500-1500 mg; expected LDL-C reduction of 15%-20%); 2  Plant sterols and stanols (active daily doses, 400-3000 mg; expected LDL-C  reduction of 8%-12%);   3  Soluble fibers including beta-glucan, psyllium, and glucomannan (active  daily doses, 5-15 g; expected LDL-C reduction of 0%-05%);   4  Garlic (active daily doses, 5-6 g; expected LDL-C reduction of 5%-10%);  5  Green tea extracts (active daily doses,  g; expected LDL-C reduction of 5%)    Source:   Raymond HULL, Colletti A, Bentley G, et al  Lipid  lowering nutraceuticals in clinical practice: position  paper from an International Lipid Expert Panel    Arch Med Sci [id number];80:214-2000  Hypertension  A pressure is reasonably well controlled  I have advised him that if he has lightheadedness after taking the amlodipine medication then he can take half a pill twice daily instead of 1 full pill once daily  INTERVAL HISTORY & HISTORY OF PRESENT ILLNESS:  Mil Partida is here for follow-up regarding his cardiac comorbidities which include:  Shortness of breath, hypertension  He was seen by us in August 2019 for evaluation of shortness of breath  Symptoms were atypical for angina or heart failure  He was noted to have slight systolic murmur and was sent for an echocardiogram   Today he mentions that he has been overall all right  He does get short of breath with exertion but and has no symptoms with normal activities  He continues to smoke  He recently underwent a colonoscopy and this was significant for 2 polyps and multiple large severe diverticular in the sigmoid colon with small internal hemorrhoids without bleeding  Reports of occasional dizziness after taking blood pressure medications and occasional transient palpitations and shortness of breath  Denies typical angina-like symptoms, has had no presyncope/syncope  Denies orthopnea, PND  Reports mild lower extremity edema at times  REVIEW OF SYMPTOMS:    Positive for:  Stable exertional shortness of breath  Negative for: All remaining as reviewed below and in HPI      SYSTEM SYMPTOMS REVIEWED:  General--weight change, fever, night sweats  Respiratory--cough, wheezing, shortness of breath, sputum production  Cardiovascular--chest pain, syncope, dyspnea on exertion, edema, decline in exercise tolerance, claudication   Gastrointestinal--persistent vomiting, diarrhea, abdominal distention, blood in stool   Muscular or skeletal--joint pain or swelling   Neurologic--headaches, syncope, abnormal movement  Hematologic--history of easy bruising and bleeding   Endocrine--thyroid enlargement, heat or cold intolerance, polyuria   Psychiatric--anxiety, depression     *-*-*-*-*-*-*-*-*-*-*-*-*-*-*-*-*-*-*-*-*-*-*-*-*-*-*-*-*-*-*-*-*-*-*-*-*-*-*-*-*-*-*-*-*-*-*-*-*-*-*-*-*-*-  VITAL SIGNS:  Vitals:    11/11/19 1029   BP: 138/70   BP Location: Right arm   Patient Position: Sitting   Cuff Size: Large   Pulse: 77   SpO2: 97%   Weight: 68 5 kg (151 lb)   Height: 5' 6" (1 676 m)     Weight (last 2 days)     Date/Time   Weight    11/11/19 1029   68 5 (151)           ,   Wt Readings from Last 3 Encounters:   11/11/19 68 5 kg (151 lb)   11/05/19 68 5 kg (151 lb)   09/16/19 68 5 kg (151 lb)    , Body mass index is 24 37 kg/m²  *-*-*-*-*-*-*-*-*-*-*-*-*-*-*-*-*-*-*-*-*-*-*-*-*-*-*-*-*-*-*-*-*-*-*-*-*-*-*-*-*-*-*-*-*-*-*-*-*-*-*-*-*-*-  PHYSICAL EXAM:  General Appearance:    Alert, cooperative, no distress, appears stated age, smells heavily of tobacco    Head, Eyes, ENT:    No obvious abnormality, moist mucous mebranes  Neck:   Supple, no carotid bruit or JVD   Back:     Symmetric, no curvature  Lungs:     Respirations unlabored  Clear to auscultation bilaterally,    Chest wall:    No tenderness or deformity   Heart:    Regular rate and rhythm, S1 and S2 normal, 2/6 systolic murmur along upper sternal border, no rub  or gallop  Abdomen:     Soft, non-tender, No obvious masses, or organomegaly   Extremities:    clubbing of fingers is noted, grade 3-4     Skin:   Skin color, texture, turgor normal, no rashes or lesions     *-*-*-*-*-*-*-*-*-*-*-*-*-*-*-*-*-*-*-*-*-*-*-*-*-*-*-*-*-*-*-*-*-*-*-*-*-*-*-*-*-*-*-*-*-*-*-*-*-*-*-*-*-*-  CURRENT MEDICATION LIST:    Current Outpatient Medications:     amLODIPine (NORVASC) 10 mg tablet, Take 10 mg by mouth, Disp: , Rfl:     Cholecalciferol 1000 units CHEW, Chew, Disp: , Rfl:     cyanocobalamin 500 MCG tablet, Take 500 mcg by mouth daily, Disp: , Rfl:     ferrous sulfate 325 (65 Fe) mg tablet, Take by mouth daily, Disp: , Rfl:     gabapentin (NEURONTIN) 100 mg capsule, Take 100 mg by mouth, Disp: , Rfl:     ALLERGIES:  Allergies   Allergen Reactions    Aspirin Other (See Comments)     Polyps in sinuses     Penicillins Rash    Sulfa Antibiotics Rash       *-*-*-*-*-*-*-*-*-*-*-*-*-*-*-*-*-*-*-*-*-*-*-*-*-*-*-*-*-*-*-*-*-*-*-*-*-*-*-*-*-*-*-*-*-*-*-*-*-*-*-*-*-*-  The LABORATORY DATA:  I have personally reviewed pertinent labs  Blood work from 08/20 9/2019 is reviewed  Creatinine is 1 23, GFR is 60, LFTs are normal, NT proBNP is 187, TSH was normal at 2 35          Potassium   Date Value Ref Range Status   08/29/2019 3 6 3 5 - 5 3 mmol/L Final   05/07/2016 4 3 3 5 - 5 3 mmol/L Final   05/06/2016 3 1 (L) 3 5 - 5 3 mmol/L Final     Chloride   Date Value Ref Range Status   08/29/2019 104 100 - 108 mmol/L Final   05/07/2016 103 100 - 108 mmol/L Final   05/06/2016 100 100 - 108 mmol/L Final     CO2   Date Value Ref Range Status   08/29/2019 24 21 - 32 mmol/L Final   05/07/2016 26 21 - 32 mmol/L Final   05/06/2016 30 21 - 32 mmol/L Final     BUN   Date Value Ref Range Status   08/29/2019 20 5 - 25 mg/dL Final   05/07/2016 33 (H) 5 - 25 mg/dL Final   05/06/2016 55 (H) 5 - 25 mg/dL Final     Creatinine   Date Value Ref Range Status   08/29/2019 1 23 0 60 - 1 30 mg/dL Final     Comment:     Standardized to IDMS reference method   05/07/2016 1 30 0 60 - 1 30 mg/dL Final     Comment:     Standardized to IDMS reference method   05/06/2016 1 65 (H) 0 60 - 1 30 mg/dL Final     Comment:     Standardized to IDMS reference method     eGFR   Date Value Ref Range Status   08/29/2019 60 ml/min/1 73sq m Final   05/07/2016 55 2 ml/min/1 73sq m Final   05/06/2016 41 9 ml/min/1 73sq m Final     Calcium   Date Value Ref Range Status   08/29/2019 8 7 8 3 - 10 1 mg/dL Final   05/07/2016 7 4 (L) 8 3 - 10 1 mg/dL Final   05/06/2016 6 9 (L) 8 3 - 10 1 mg/dL Final     AST   Date Value Ref Range Status   08/29/2019 15 5 - 45 U/L Final     Comment:       Specimen collection should occur prior to Sulfasalazine administration due to the potential for falsely depressed results  05/05/2016 15 5 - 45 U/L Final     ALT   Date Value Ref Range Status   08/29/2019 17 12 - 78 U/L Final     Comment:       Specimen collection should occur prior to Sulfasalazine administration due to the potential for falsely depressed results  05/05/2016 12 12 - 78 U/L Final     Alkaline Phosphatase   Date Value Ref Range Status   08/29/2019 96 46 - 116 U/L Final   05/05/2016 69 46 - 116 U/L Final     Magnesium   Date Value Ref Range Status   05/06/2016 1 7 1 6 - 2 6 mg/dL Final     WBC   Date Value Ref Range Status   08/29/2019 11 36 (H) 4 31 - 10 16 Thousand/uL Final   05/06/2016 9 78 4 31 - 10 16 Thousand/uL Final   05/05/2016 9 89 4 31 - 10 16 Thousand/uL Final     Hemoglobin   Date Value Ref Range Status   08/29/2019 13 7 12 0 - 17 0 g/dL Final   05/06/2016 10 3 (L) 12 0 - 17 0 g/dL Final   05/05/2016 12 2 12 0 - 17 0 g/dL Final     Platelets   Date Value Ref Range Status   08/29/2019 305 149 - 390 Thousands/uL Final   05/06/2016 169 149 - 390 Thousands/uL Final   05/05/2016 188 149 - 390 Thousands/uL Final     No results found for: PT, PTT, INR  No results found for: CKMB, DIGOXIN  No results found for: TSH  No results found for: CHOL, HDL, LDL, TRIG   No results found for: HGBA1C  Urine Culture   Date Value Ref Range Status   05/04/2016 50,000-59,000 cfu/ml Mixed Contaminants X4  Final     C difficile toxin by PCR   Date Value Ref Range Status   05/04/2016 NEGATIVE for C difficle toxin by PCR  NEGATIVE for C difficle toxin by PCR  Final       *-*-*-*-*-*-*-*-*-*-*-*-*-*-*-*-*-*-*-*-*-*-*-*-*-*-*-*-*-*-*-*-*-*-*-*-*-*-*-*-*-*-*-*-*-*-*-*-*-*-*-*-*-*-  PREVIOUS CARDIOLOGY & RADIOLOGY RESULTS:  Results for orders placed during the hospital encounter of 10/04/19   Echo complete with contrast if indicated    Narrative [address]    [place], [address]  [phone]    Transthoracic Echocardiogram  2D, M-mode, Doppler, and Color Doppler    Study date:  04-Oct-2019    Patient: Good Zepeda  MR number: [id number]  Account number: [de-identified]  : 1950  Age: 71 years  Gender: Male  Status: Outpatient  Location: [address] [place] [address] [facility] [place]  Height: 66 in  Weight: 150 9 lb  BP: 130/ 62 mmHg    Indications: Shortness of breath  Diagnoses: R06 02 - Shortness of breath    Sonographer:  GAETANO Hughes  Referring Physician:  Elana Cabrera MD  Group:  [facility]  Interpreting Physician:  BRI Lucio     SUMMARY    LEFT VENTRICLE:  Systolic function was normal by visual assessment  Ejection fraction was estimated to be 55 %  There were no regional wall motion abnormalities  Doppler parameters were consistent with abnormal left ventricular relaxation (grade 1 diastolic dysfunction)  MITRAL VALVE:  There was trace regurgitation  AORTIC VALVE:  The valve was not visualized well enough to rule out a bicuspid morphology  The right coronary cusp had markedly reduced motion and may demonstrate patrial fusion with the left cusp near the annulus  Leaflets exhibited moderately increased  thickness and moderate calcification  The right coronary cusp demonstrated moderate calcification, markedly reduced excursion, and reduced mobility  There was mild stenosis  VMax 2 04m/s, Mean Gradient 8 80 mmHg, RAD 1 8cm2    PULMONIC VALVE:  There was trace regurgitation  AORTA:  There was mild dilatation of the ascending aorta  HISTORY: PRIOR HISTORY: COPD  Hypertension  Smoker  PROCEDURE: The study was performed in the [address] [place] [address]  This was a routine study  The transthoracic approach was used  The study included complete 2D imaging, M-mode, complete spectral Doppler, and color Doppler  The  heart rate was 75 bpm, at the start of the study  Image quality was adequate  LEFT VENTRICLE: Size was normal  Systolic function was normal by visual assessment   Ejection fraction was estimated to be 55 %  There were no regional wall motion abnormalities  Wall thickness was normal  DOPPLER: Doppler parameters were  consistent with abnormal left ventricular relaxation (grade 1 diastolic dysfunction)  RIGHT VENTRICLE: The size was normal  Systolic function was normal  Wall thickness was normal     LEFT ATRIUM: Size was normal     RIGHT ATRIUM: Size was normal     MITRAL VALVE: Valve structure was normal  There was normal leaflet separation  DOPPLER: The transmitral velocity was within the normal range  There was no evidence for stenosis  There was trace regurgitation  AORTIC VALVE: The valve was not visualized well enough to rule out a bicuspid morphology  The right coronary cusp had markedly reduced motion and may demonstrate patrial fusion with the left cusp near the annulus  Leaflets exhibited  moderately increased thickness and moderate calcification  The right coronary cusp demonstrated moderate calcification, markedly reduced excursion, and reduced mobility  DOPPLER: There was mild stenosis  VMax 2 04m/s, Mean Gradient 8 80  mmHg, RAD 1 8cm2 There was no significant regurgitation  TRICUSPID VALVE: The valve structure was normal  There was normal leaflet separation  DOPPLER: The transtricuspid velocity was within the normal range  There was no evidence for stenosis  There was no regurgitation  The tricuspid jet  envelope definition was inadequate for estimation of RV systolic pressure  There are no indirect findings (abnormal RV volume or geometry, altered pulmonary flow velocity profile, or leftward septal displacement) which would suggest  moderate or severe pulmonary hypertension  PULMONIC VALVE: Not well visualized  DOPPLER: There was no evidence for stenosis  There was trace regurgitation  PERICARDIUM: There was no pericardial effusion  The pericardium was normal in appearance  AORTA: The root exhibited upper limit of normal size   There was mild dilatation of the ascending aorta  SYSTEMIC VEINS: IVC: The inferior vena cava was normal in size and course  Respirophasic changes were normal     SYSTEM MEASUREMENT TABLES    2D  %FS: 34 4 %  Ao Diam: 3 7 cm  EDV(Teich): 88 9 ml  EF Biplane: 61 9 %  EF(Teich): 63 6 %  ESV(Teich): 32 4 ml  IVSd: 1 cm  LA Area: 14 1 cm2  LA Diam: 3 cm  LAAs A4C: 14 cm2  LAESV A-L A4C: 35 6 ml  LAESV MOD A4C: 33 5 ml  LALs A4C: 4 6 cm  LVEDV MOD A2C: 52 ml  LVEDV MOD A4C: 59 7 ml  LVEDV MOD BP: 58 3 ml  LVEF MOD A2C: 57 5 %  LVEF MOD A4C: 63 3 %  LVESV MOD A2C: 22 1 ml  LVESV MOD A4C: 21 9 ml  LVESV MOD BP: 22 2 ml  LVIDd: 4 4 cm  LVIDs: 2 9 cm  LVLd A2C: 8 1 cm  LVLd A4C: 7 8 cm  LVLs A2C: 6 4 cm  LVLs A4C: 6 6 cm  LVOT Diam: 2 4 cm  LVPWd: 0 9 cm  RA Area: 13 6 cm2  RVIDd: 3 cm  SV MOD A2C: 29 9 ml  SV MOD A4C: 37 8 ml  SV(Teich): 56 6 ml    CW  AV Env  Ti: 355 2 ms  AV VTI: 48 7 cm  AV Vmax: 2 m/s  AV Vmean: 1 4 m/s  AV maxP 6 mmHg  AV meanP 8 mmHg    MM  TAPSE: 2 2 cm    PW  RAD (VTI): 1 9 cm2  RAD Vmax: 1 8 cm2  E': 0 1 m/s  E/E': 12 5  HR: 68 4 BPM  LVCO Dopp: 6 4 L/min  LVOT Env  Ti: 429 1 ms  LVOT VTI: 21 4 cm  LVOT Vmax: 0 8 m/s  LVOT Vmean: 0 5 m/s  LVOT maxP 7 mmHg  LVOT meanP 2 mmHg  LVSV Dopp: 94 ml  Lateral E': 0 1 m/s  MV A Irvin: 0 9 m/s  MV Dec Hill: 3 2 m/s2  MV DecT: 254 7 ms  MV E Irvin: 0 8 m/s  MV E/A Ratio: 0 9  MV PHT: 73 9 ms  MVA By PHT: 3 cm2    [employer] Accredited Echocardiography Laboratory    Prepared and electronically signed by    BRI Saez  Signed 04-Oct-2019 16:24:14       No results found for this or any previous visit  No results found for this or any previous visit  No results found for this or any previous visit    Colonoscopy  Addendum: [address] [facility]   [address]    [phone]     DATE OF SERVICE:   19     PHYSICIAN(S):    Kris Appiah - Attending Physician    Aydin Guerra - Fellow     INDICATION: Colon cancer screening   Colonoscopy performed for a screening indication  POST-OP DIAGNOSIS:   See the impression below  HISTORY:   Prior colonoscopy: More than 10 years ago  Colon polyps No    Colon cancer No      PREPROCEDURE:   Informed consent was obtained for the procedure, including sedation  Risks  including but not limited to bleeding, infection, perforation, adverse  drug reaction and aspiration were explained in detail  Also explained  about less than 100% sensitivity with the exam and other alternatives  The  patient was placed in the left lateral decubitus position  DETAILS OF PROCEDURE:   The patient underwent moderate sedation, which was administered by an  anesthesia professional  The patient's blood pressure, heart rate, level  of consciousness, oxygen and respirations were monitored throughout the  procedure  A digital rectal exam wasperformed  The scope was introduced  through the anus and advanced to the cecum  Photodocumentation was  obtained at the ileocecal valve and appendiceal orifice  Retroflexion was  performed in the rectum  The quality of bowel preparation was  evaluatedusing the [facility] Bowel Preparation Scale with scores of: right  colon = 2, transverse colon = 2, left colon = 2  The total score was 6  Bowel prep was adequate  The patient experienced no blood loss  The  procedure was moderately difficult due toangulation and loops in the  digestive tract  In response to procedure difficulty, the instrument was  changed to a pediatric endoscope  The patient tolerated the procedure  well  There were no apparent complications        ANESTHESIA INFORMATION:   ASA: III   Anesthesia Type: IV Sedation with Anesthesia     FINDINGS:   One polyp measuring 5-10 mm in the ascending colon; removed by cold snare   Two polyps measuring smaller than 5 mm in the sigmoid colon; removed by  cold snare   Multiple large severe diverticula causing mild luminal narrowing in the  sigmoid colon   Small and internal hemorrhoids with no bleeding     EVENTS:   Procedure Events    Event Event Time    ENDO CECUM REACHED 11/5/2019 12:13 PM    ENDO SCOPE OUT TIME 11/5/2019 12:28 PM      SPECIMENS:   ID Type Source Tests Collected by Time Destination    1 : Ascending colon polyp  Tissue Polyp, Colorectal TISSUE EXAM Uma Whitaker MD 11/5/2019 12:16 PM     2 : Sigmoid colon polyp cold snare  Tissue Polyp, Colorectal TISSUE EXAM  Uma Whitaker MD 11/5/2019 12:27 PM           IMPRESSION:   1  Three polyps were seen and removed  2  Moderate to severe diverticulosis in the sigmoid colon making passage  of the colonoscope slightly difficult  3  Small-size internal hemorrhoids  RECOMMENDATION:   Repeat in 5 years due to a personal history of colon polyps  If all 3  polyps are adenomas then would recommend 3 years  High-fiber diet  Narrative: [address] [facility]  [address] [place] [address]  [phone]    DATE OF SERVICE:  11/05/19    PHYSICIAN(S):   Thi Blackmon - Attending Physician   Anneliese Carroll - Fellow    INDICATION:  Colon cancer screening  Colonoscopy performed for a screening indication  POST-OP DIAGNOSIS:  See the impression below  HISTORY:  Prior colonoscopy: More than 10 years ago  Colon polyps No   Colon cancer No     PREPROCEDURE:  Informed consent was obtained for the procedure, including sedation  Risks   including but not limited to bleeding, infection, perforation, adverse   drug reaction and aspiration were explained in detail  Also explained   about less than 100% sensitivity with the exam and other alternatives  The   patient was placed in the left lateral decubitus position  DETAILS OF PROCEDURE:  The patient underwent moderate sedation, which was administered by an   anesthesia professional  The patient's blood pressure, heart rate, level   of consciousness, oxygen and respirations were monitored throughout the   procedure   A digital rectal exam wasperformed  The scope was introduced   through the anus and advanced to the cecum  Photodocumentation was   obtained at the ileocecal valve and appendiceal orifice  Retroflexion was   performed in the rectum  The quality of bowel preparation was   evaluatedusing the Jere Bowel Preparation Scale with scores of: right   colon = 2, transverse colon = 2, left colon = 2  The total score was 6  Bowel prep was adequate  The patient experienced no blood loss  The   procedure was moderately difficult due toangulation and loops in the   digestive tract  In response to procedure difficulty, the instrument was   changed to a pediatric endoscope  The patient tolerated the procedure   well  There were no apparent complications  ANESTHESIA INFORMATION:  ASA: III  Anesthesia Type: IV Sedation with Anesthesia    FINDINGS:  One polyp measuring 5-10 mm in the ascending colon; removed by cold snare  Two polyps measuring smaller than 5 mm in the sigmoid colon; removed by   cold snare  Multiple large severe diverticula causing mild luminal narrowing in the   sigmoid colon  Small and internal hemorrhoids with no bleeding    EVENTS:  Procedure Events   Event Event Time   ENDO CECUM REACHED 11/5/2019 12:13 PM   ENDO SCOPE OUT TIME 11/5/2019 12:28 PM     SPECIMENS:  ID Type Source Tests Collected by Time Destination   1 : Ascending colon polyp  Tissue Polyp, Colorectal TISSUE EXAM Curt Almaguer MD 11/5/2019 12:16 PM    2 : Sigmoid colon polyp cold snare  Tissue Polyp, Colorectal TISSUE EXAM   Bri Loaiza MD 11/5/2019 12:27 PM         Impression: 1  Three polyps were seen and removed  2  Moderate to severe diverticulosis in the sigmoid colon making passage   of the colonoscope slightly difficult  3  Small-size internal hemorrhoids  RECOMMENDATION:  Repeat in 5 years due to a personal history of colon polyps  If all 3   polyps are adenomas then would recommend 3 years  High-fiber diet  *-*-*-*-*-*-*-*-*-*-*-*-*-*-*-*-*-*-*-*-*-*-*-*-*-*-*-*-*-*-*-*-*-*-*-*-*-*-*-*-*-*-*-*-*-*-*-*-*-*-*-*-*-*-  SIGNATURES:   @JDP@   Adriana Bhandari MD     *-*-*-*-*-*-*-*-*-*-*-*-*-*-*-*-*-*-*-*-*-*-*-*-*-*-*-*-*-*-*-*-*-*-*-*-*-*-*-*-*-*-*-*-*-*-*-*-*-*-*-*-*-*-    Social History     Socioeconomic History    Marital status: /Civil Union     Spouse name: Not on file    Number of children: Not on file    Years of education: Not on file    Highest education level: Not on file   Occupational History    Not on file   Social Needs    Financial resource strain: Not on file    Food insecurity:     Worry: Not on file     Inability: Not on file    Transportation needs:     Medical: Not on file     Non-medical: Not on file   Tobacco Use    Smoking status: Current Every Day Smoker     Packs/day: 2 00    Smokeless tobacco: Never Used   Substance and Sexual Activity    Alcohol use: Yes     Drinks per session: 5 or 6     Binge frequency: Weekly     Comment: social    Drug use: No    Sexual activity: Not on file   Lifestyle    Physical activity:     Days per week: Not on file     Minutes per session: Not on file    Stress: Not on file   Relationships    Social connections:     Talks on phone: Not on file     Gets together: Not on file     Attends Jewish service: Not on file     Active member of club or organization: Not on file     Attends meetings of clubs or organizations: Not on file     Relationship status: Not on file    Intimate partner violence:     Fear of current or ex partner: Not on file     Emotionally abused: Not on file     Physically abused: Not on file     Forced sexual activity: Not on file   Other Topics Concern    Not on file   Social History Narrative    Not on file      No family history on file    Past Surgical History:   Procedure Laterality Date    COLONOSCOPY      HERNIA REPAIR      PROSTATECTOMY

## 2019-11-11 NOTE — ASSESSMENT & PLAN NOTE
A pressure is reasonably well controlled  I have advised him that if he has lightheadedness after taking the amlodipine medication then he can take half a pill twice daily instead of 1 full pill once daily

## 2019-11-11 NOTE — PATIENT INSTRUCTIONS
CARDIOLOGY ASSESSMENT & PLAN:  1  Shortness of breath on exertion     2  Aortic valve stenosis, mild     3  Essential hypertension       Shortness of breath on exertion  Patient shortness of breath is likely multifactorial, relating to his suspected COPD from chronic smoking and mild aortic valve stenosis  Today on physical examination he has no signs and symptoms suggestive of congestive heart failure  He does have clinical features of clubbing on his finger suggesting COPD  He recently had CT scan of chest at [facility] that was significant for atelectasis without evidence of emphysema  His blood pressure is reasonably well controlled  - at this time I have reassured him that no immediate intervention is needed for his aortic valve  However I have made him aware that his smoking will accelerate the progression of his aortic valve disease and eventually he will likely need a valve replacement  I have encouraged him to cut down on smoking  He reports significant stress stress including taking care of paraplegic son at home, having a brother who is a at [facility] and may need open heart surgery and having lost his brother and other son recently have increased his stress which makes quitting smoking difficult at this time  - I would like him to continue his current antihypertensive therapy with amlodipine  - I am advising him to monitor his weight from time to time and monitor for worsening symptoms such as anginal like chest pain, near-syncope, worsening shortness of breath or lower extremity swelling  Aortic valve stenosis, mild  Evaluation and plan as noted above  Request lipid profile with next blood work and initiation of statin therapy at [facility] if lipids are even slightly abnormal   Meanwhile advising patient to consider nonpharmacologic measures to improve cholesterol profile  Some of these are outlined below      Nutraceutical supplements that may further lower blood cholesterol:    1  Berberine (active daily doses, 500-1500 mg; expected LDL-C reduction of 15%-20%); 2  Plant sterols and stanols (active daily doses, 400-3000 mg; expected LDL-C  reduction of 8%-12%);   3  Soluble fibers including beta-glucan, psyllium, and glucomannan (active  daily doses, 5-15 g; expected LDL-C reduction of 0%-90%);   4  Garlic (active daily doses, 5-6 g; expected LDL-C reduction of 5%-10%);  5  Green tea extracts (active daily doses,  g; expected LDL-C reduction of 5%)    Source:   Raymond HULL, Colletti A, Bentley G, et al  Lipid  lowering nutraceuticals in clinical practice: position  paper from an International Lipid Expert Panel  Arch Med Sci 2017;13:965-1005  Hypertension  A pressure is reasonably well controlled  I have advised him that if he has lightheadedness after taking the amlodipine medication then he can take half a pill twice daily instead of 1 full pill once daily  DIAGNOSES:  1  History of hypertension  2  History of prostrate CA and BPH  3  History of anemia  4  History of lung nodules and  5  History of chronic tobacco abuse  6  Remote history of rib fracture on the left side  7  Iron deficiency anemia   8  Suspected peripheral artery disease with intermittent claudication  9  Colonic polyps and diverticular disease    Echocardiogram October 4, 2019:  Normal left ventricular size and systolic function, EF around 47%, grade 1 diastolic dysfunction, trace mitral valve regurgitation, calcific aortic valve with reduced cuspal separation, mild aortic valve stenosis with peak velocity of 2 04 and mean gradient of 8 8 and calculated aortic valve area of 1 8 cm2, trace pulmonic valve regurgitation, mildly dilated ascending aorta

## 2019-11-11 NOTE — ASSESSMENT & PLAN NOTE
Evaluation and plan as noted above  Request lipid profile with next blood work and initiation of statin therapy at [facility] if lipids are even slightly abnormal   Meanwhile advising patient to consider nonpharmacologic measures to improve cholesterol profile  Some of these are outlined below  Nutraceutical supplements that may further lower blood cholesterol:    1  Berberine (active daily doses, 500-1500 mg; expected LDL-C reduction of 15%-20%); 2  Plant sterols and stanols (active daily doses, 400-3000 mg; expected LDL-C  reduction of 8%-12%);   3  Soluble fibers including beta-glucan, psyllium, and glucomannan (active  daily doses, 5-15 g; expected LDL-C reduction of 7%-89%);   4  Garlic (active daily doses, 5-6 g; expected LDL-C reduction of 5%-10%);  5  Green tea extracts (active daily doses,  g; expected LDL-C reduction of 5%)    Source:   Raymond HULL, Colletti A, Bentley G, et al  Lipid  lowering nutraceuticals in clinical practice: position  paper from an International Lipid Expert Panel  Arch Med Sci 2017;13:965-1005

## 2019-12-26 ENCOUNTER — TRANSCRIBE ORDERS (OUTPATIENT)
Dept: ADMINISTRATIVE | Facility: HOSPITAL | Age: 69
End: 2019-12-26

## 2019-12-26 DIAGNOSIS — R91.1 PULMONARY NODULE: Primary | ICD-10-CM

## 2019-12-26 DIAGNOSIS — R91.1 LUNG NODULE: Primary | ICD-10-CM

## 2020-01-03 ENCOUNTER — HOSPITAL ENCOUNTER (OUTPATIENT)
Dept: NUCLEAR MEDICINE | Facility: HOSPITAL | Age: 70
Discharge: HOME | End: 2020-01-03
Payer: COMMERCIAL

## 2020-01-03 DIAGNOSIS — R91.1 PULMONARY NODULE: ICD-10-CM

## 2020-01-03 LAB — GLUCOSE SERPL-MCNC: 104 MG/DL (ref 65–140)

## 2020-01-03 PROCEDURE — A9552 F18 FDG: HCPCS

## 2020-01-03 PROCEDURE — 78815 PET IMAGE W/CT SKULL-THIGH: CPT

## 2020-01-03 PROCEDURE — 82948 REAGENT STRIP/BLOOD GLUCOSE: CPT

## 2020-04-29 ENCOUNTER — TELEPHONE (OUTPATIENT)
Dept: CARDIOLOGY CLINIC | Facility: CLINIC | Age: 70
End: 2020-04-29

## 2020-05-18 ENCOUNTER — OFFICE VISIT (OUTPATIENT)
Dept: CARDIOLOGY CLINIC | Facility: CLINIC | Age: 70
End: 2020-05-18
Payer: COMMERCIAL

## 2020-05-18 VITALS
WEIGHT: 153 LBS | BODY MASS INDEX: 24.59 KG/M2 | SYSTOLIC BLOOD PRESSURE: 140 MMHG | DIASTOLIC BLOOD PRESSURE: 60 MMHG | HEIGHT: 66 IN | HEART RATE: 56 BPM | TEMPERATURE: 97.2 F

## 2020-05-18 DIAGNOSIS — I73.9 PVD (PERIPHERAL VASCULAR DISEASE) (HCC): ICD-10-CM

## 2020-05-18 DIAGNOSIS — I35.0 AORTIC VALVE STENOSIS, MILD: Primary | ICD-10-CM

## 2020-05-18 DIAGNOSIS — R06.02 SHORTNESS OF BREATH: ICD-10-CM

## 2020-05-18 DIAGNOSIS — R06.02 SHORTNESS OF BREATH ON EXERTION: ICD-10-CM

## 2020-05-18 DIAGNOSIS — I10 ESSENTIAL HYPERTENSION: ICD-10-CM

## 2020-05-18 PROCEDURE — 93000 ELECTROCARDIOGRAM COMPLETE: CPT | Performed by: INTERNAL MEDICINE

## 2020-05-18 PROCEDURE — 99214 OFFICE O/P EST MOD 30 MIN: CPT | Performed by: INTERNAL MEDICINE

## 2020-05-18 RX ORDER — AMLODIPINE BESYLATE 5 MG/1
5 TABLET ORAL DAILY
Qty: 90 TABLET | Refills: 3 | Status: SHIPPED | OUTPATIENT
Start: 2020-05-18 | End: 2020-07-20 | Stop reason: SDUPTHER

## 2020-07-20 DIAGNOSIS — I10 ESSENTIAL HYPERTENSION: ICD-10-CM

## 2020-07-20 DIAGNOSIS — I35.0 AORTIC VALVE STENOSIS, MILD: ICD-10-CM

## 2020-07-21 RX ORDER — AMLODIPINE BESYLATE 5 MG/1
5 TABLET ORAL DAILY
Qty: 60 TABLET | Refills: 5 | Status: SHIPPED | OUTPATIENT
Start: 2020-07-21 | End: 2020-08-07 | Stop reason: SDUPTHER

## 2020-08-07 DIAGNOSIS — I35.0 AORTIC VALVE STENOSIS, MILD: ICD-10-CM

## 2020-08-07 DIAGNOSIS — I10 ESSENTIAL HYPERTENSION: ICD-10-CM

## 2020-08-07 RX ORDER — AMLODIPINE BESYLATE 5 MG/1
5 TABLET ORAL 2 TIMES DAILY
Qty: 60 TABLET | Refills: 5 | Status: SHIPPED | OUTPATIENT
Start: 2020-08-07

## 2022-02-02 ENCOUNTER — TELEPHONE (OUTPATIENT)
Dept: UROLOGY | Facility: MEDICAL CENTER | Age: 72
End: 2022-02-02

## 2022-02-02 NOTE — TELEPHONE ENCOUNTER
----- Message from Jeanell Nissen, [address] [place] sent at 2/2/2022  9:58 AM EST -----  Regarding: FW: SKY: New Patient  Please triage and schedule accordingly    ----- Message -----  From: Rick Vergara  Sent: 2/2/2022   9:40 AM EST  To: [facility] Clerical  Subject: SKY: New Patient                                 New Patient Referral; pt requests to transfer his care from [facility] to [facility]    The diagnosis is Carcinoma of the the prostate (D07 5)  Referral is in the process of being scanned into the patient chart

## 2022-02-03 NOTE — TELEPHONE ENCOUNTER
Call placed to patient and spoke with him  Offered him an appointment with Dr Mariajose Michael on 2- at 9:30am to [facility] for prostate cancer  Pt confirmed this appointment

## 2022-02-22 ENCOUNTER — OFFICE VISIT (OUTPATIENT)
Dept: UROLOGY | Facility: MEDICAL CENTER | Age: 72
End: 2022-02-22
Payer: COMMERCIAL

## 2022-02-22 VITALS
HEART RATE: 89 BPM | SYSTOLIC BLOOD PRESSURE: 150 MMHG | DIASTOLIC BLOOD PRESSURE: 70 MMHG | HEIGHT: 66 IN | WEIGHT: 158 LBS | BODY MASS INDEX: 25.39 KG/M2

## 2022-02-22 DIAGNOSIS — N32.0 BLADDER NECK CONTRACTURE: ICD-10-CM

## 2022-02-22 DIAGNOSIS — C61 PROSTATE CANCER (HCC): Primary | ICD-10-CM

## 2022-02-22 DIAGNOSIS — N39.3 STRESS INCONTINENCE: ICD-10-CM

## 2022-02-22 DIAGNOSIS — Z90.79 HISTORY OF PROSTATECTOMY: ICD-10-CM

## 2022-02-22 DIAGNOSIS — Z78.9 HISTORY OF URINARY SELF-CATHETERIZATION: ICD-10-CM

## 2022-02-22 LAB
SL AMB  POCT GLUCOSE, UA: NORMAL
SL AMB LEUKOCYTE ESTERASE,UA: NORMAL
SL AMB POCT BILIRUBIN,UA: NORMAL
SL AMB POCT BLOOD,UA: NORMAL
SL AMB POCT CLARITY,UA: CLEAR
SL AMB POCT COLOR,UA: YELLOW
SL AMB POCT KETONES,UA: NORMAL
SL AMB POCT NITRITE,UA: NORMAL
SL AMB POCT PH,UA: 5.5
SL AMB POCT SPECIFIC GRAVITY,UA: 1
SL AMB POCT URINE PROTEIN: NORMAL
SL AMB POCT UROBILINOGEN: 0.2

## 2022-02-22 PROCEDURE — 99204 OFFICE O/P NEW MOD 45 MIN: CPT | Performed by: UROLOGY

## 2022-02-22 PROCEDURE — 87086 URINE CULTURE/COLONY COUNT: CPT | Performed by: UROLOGY

## 2022-02-22 PROCEDURE — 87077 CULTURE AEROBIC IDENTIFY: CPT | Performed by: UROLOGY

## 2022-02-22 PROCEDURE — 81003 URINALYSIS AUTO W/O SCOPE: CPT | Performed by: UROLOGY

## 2022-02-22 NOTE — PROGRESS NOTES
HISTORY:    1  Recurrent bladder neck contracture, has had several procedures both at the [place], in [place], and [facility] for this  Last office procedure at [facility] THE [facility] was November 2021, office cysto with attempted dilation, unsuccessful  Was recommended to have bladder neck  surgery at [facility], but chose to switch care to [facility]  2  Radical prostatectomy in 2005 in the [place]  3  Stress incontinence, has become worse over the past few years  He wears several pads per day  Daytime, does not really get much of a stream, just leaks  Nighttime, he will have some stream nocturia x2   4  He is on self catheterization every other day [address], but still does not feel that the stream is good  5  Recurrent UTI, just finished info antibiotics recently    6  He says most recent PSA was 0 19         ASSESSMENT / PLAN:    1  Will schedule bladder neck contracture procedure, I discussed again that this can make a stress incontinence worse, he understands that  2  Urine culture today  3  Will repeat PSA     The following portions of the patient's history were reviewed and updated as appropriate: allergies, current medications, past family history, past medical history, past social history, past surgical history and problem list     Review of Systems   All other systems reviewed and are negative  Objective:     Physical Exam  Constitutional:       General: He is not in acute distress  Appearance: He is well-developed  He is not diaphoretic  HENT:      Head: Normocephalic and atraumatic  Eyes:      General: No scleral icterus  Pulmonary:      Effort: Pulmonary effort is normal    Genitourinary:     Comments: Penis testes some atrophy, steady stream of leakage during exam  Skin:     Coloration: Skin is not pale  Neurological:      Mental Status: He is alert and oriented to person, place, and time  Psychiatric:         Behavior: Behavior normal          Thought Content:  Thought content normal          Judgment: Judgment normal            No results found for: PSA]  BUN   Date Value Ref Range Status   08/29/2019 20 5 - 25 mg/dL Final     Creatinine   Date Value Ref Range Status   08/29/2019 1 23 0 60 - 1 30 mg/dL Final     Comment:     Standardized to IDMS reference method     No components found for: CBC      Patient Active Problem List   Diagnosis    [facility]    Lung nodules    Hypertension    History of prostate cancer    Anemia    Tobacco abuse    Acute kidney failure (HCC)    Hypotension    Shortness of breath on exertion    Colon cancer screening    Aortic valve stenosis, mild        Diagnoses and all orders for this visit:    Prostate cancer ([facility] )  -     POCT urine dip auto non-scope    History of prostatectomy    Bladder neck contracture  -     Urine culture  -     Case request operating room: RESECTION TRANSURETHRAL BLADDER NECK CONTRACTURE; Standing  -     Case request operating room: RESECTION TRANSURETHRAL BLADDER NECK CONTRACTURE    Stress incontinence    History of urinary self-catheterization           Patient ID: Leanne Selby is a 67 y o  male        Current Outpatient Medications:     albuterol (PROVENTIL HFA,VENTOLIN HFA) 90 mcg/act inhaler, INHALE 2 PUFFS BY MOUTH EVERY FOUR HOURS HRLY AS NEEDED FOR BREATHING, Disp: , Rfl:     albuterol (PROVENTIL HFA,VENTOLIN HFA) 90 mcg/act inhaler, Inhale 2 puffs every 6 (six) hours as needed, Disp: , Rfl:     amLODIPine (NORVASC) 5 mg tablet, Take 1 tablet (5 mg total) by mouth 2 (two) times a day, Disp: 60 tablet, Rfl: 5    calcium carbonate (OS-TEJ) 1250 (500 Ca) MG chewable tablet, Chew 1 tablet, Disp: , Rfl:     Cholecalciferol 1000 units CHEW, Chew, Disp: , Rfl:     cyanocobalamin 500 MCG tablet, Take 500 mcg by mouth daily, Disp: , Rfl:     ezetimibe (ZETIA) 10 mg tablet, TAKE ONE-HALF TABLET BY MOUTH DAILY FOR CHOLESTEROL, Disp: , Rfl:     ferrous sulfate 325 (65 Fe) mg tablet, Take by mouth daily, Disp: , Rfl:    losartan (COZAAR) 100 MG tablet, TAKE ONE-HALF TABLET BY MOUTH EVERY DAY FOR BLOOD PRESSURE/HEART, Disp: , Rfl:     rosuvastatin (CRESTOR) 5 mg tablet, TAKE ONE-HALF TABLET BY MOUTH ONCE WEEKLY FOR CHOLESTEROL, Disp: , Rfl:     Past Medical History:   Diagnosis Date    Anemia     [facility]     prostate    Hyperlipidemia     Hypertension     Leaky heart valve     Lung nodules     unsure of dx    PVD (peripheral vascular disease) ([facility] )     Tobacco abuse        Past Surgical History:   Procedure Laterality Date    COLONOSCOPY      HERNIA REPAIR      PROSTATECTOMY         Social History

## 2022-02-23 ENCOUNTER — TELEPHONE (OUTPATIENT)
Dept: UROLOGY | Facility: MEDICAL CENTER | Age: 72
End: 2022-02-23

## 2022-02-23 NOTE — TELEPHONE ENCOUNTER
I spoke to the patient and scheduled his Resection of Bladder Neck contracture  for 3/8/2022 at [facility] with Dr Denys Vargas     -instructions given verbally and Emailed  -CBC, CMP, Urine C&S and EKG  2 weeks prior  -patient will avoid any potentially blood thinning medications 7 days prior  -[place] - Auth# [place] [id number] 1-31-22 to 8-21-22

## 2022-02-24 ENCOUNTER — HOSPITAL ENCOUNTER (OUTPATIENT)
Dept: NON INVASIVE DIAGNOSTICS | Facility: HOSPITAL | Age: 72
Discharge: HOME | End: 2022-02-24
Attending: UROLOGY
Payer: COMMERCIAL

## 2022-02-24 ENCOUNTER — APPOINTMENT (OUTPATIENT)
Dept: LAB | Facility: HOSPITAL | Age: 72
End: 2022-02-24
Attending: UROLOGY
Payer: COMMERCIAL

## 2022-02-24 DIAGNOSIS — N32.0 BLADDER NECK CONTRACTURE: ICD-10-CM

## 2022-02-24 DIAGNOSIS — C61 PROSTATE CANCER (HCC): ICD-10-CM

## 2022-02-24 LAB
ALBUMIN SERPL BCP-MCNC: 3.5 G/DL (ref 3.5–5)
ALP SERPL-CCNC: 83 U/L (ref 46–116)
ALT SERPL W P-5'-P-CCNC: 27 U/L (ref 12–78)
ANION GAP SERPL CALCULATED.3IONS-SCNC: 9 MMOL/L (ref 4–13)
AST SERPL W P-5'-P-CCNC: 15 U/L (ref 5–45)
BACTERIA UR CULT: ABNORMAL
BASOPHILS # BLD AUTO: 0.11 THOUSANDS/ΜL (ref 0–0.1)
BASOPHILS NFR BLD AUTO: 1 % (ref 0–1)
BILIRUB SERPL-MCNC: 0.29 MG/DL (ref 0.2–1)
BUN SERPL-MCNC: 24 MG/DL (ref 5–25)
CALCIUM SERPL-MCNC: 8.6 MG/DL (ref 8.3–10.1)
CHLORIDE SERPL-SCNC: 105 MMOL/L (ref 100–108)
CO2 SERPL-SCNC: 24 MMOL/L (ref 21–32)
CREAT SERPL-MCNC: 1.31 MG/DL (ref 0.6–1.3)
EOSINOPHIL # BLD AUTO: 0.3 THOUSAND/ΜL (ref 0–0.61)
EOSINOPHIL NFR BLD AUTO: 3 % (ref 0–6)
ERYTHROCYTE [DISTWIDTH] IN BLOOD BY AUTOMATED COUNT: 14.1 % (ref 11.6–15.1)
GFR SERPL CREATININE-BSD FRML MDRD: 54 ML/MIN/1.73SQ M
GLUCOSE P FAST SERPL-MCNC: 123 MG/DL (ref 65–99)
HCT VFR BLD AUTO: 39.8 % (ref 36.5–49.3)
HGB BLD-MCNC: 12.7 G/DL (ref 12–17)
IMM GRANULOCYTES # BLD AUTO: 0.04 THOUSAND/UL (ref 0–0.2)
IMM GRANULOCYTES NFR BLD AUTO: 0 % (ref 0–2)
LYMPHOCYTES # BLD AUTO: 3.66 THOUSANDS/ΜL (ref 0.6–4.47)
LYMPHOCYTES NFR BLD AUTO: 35 % (ref 14–44)
MCH RBC QN AUTO: 28.9 PG (ref 26.8–34.3)
MCHC RBC AUTO-ENTMCNC: 31.9 G/DL (ref 31.4–37.4)
MCV RBC AUTO: 91 FL (ref 82–98)
MONOCYTES # BLD AUTO: 1.03 THOUSAND/ΜL (ref 0.17–1.22)
MONOCYTES NFR BLD AUTO: 10 % (ref 4–12)
NEUTROPHILS # BLD AUTO: 5.36 THOUSANDS/ΜL (ref 1.85–7.62)
NEUTS SEG NFR BLD AUTO: 51 % (ref 43–75)
NRBC BLD AUTO-RTO: 0 /100 WBCS
PLATELET # BLD AUTO: 320 THOUSANDS/UL (ref 149–390)
PMV BLD AUTO: 10.1 FL (ref 8.9–12.7)
POTASSIUM SERPL-SCNC: 4.3 MMOL/L (ref 3.5–5.3)
PROT SERPL-MCNC: 7.9 G/DL (ref 6.4–8.2)
PSA SERPL-MCNC: 0.2 NG/ML (ref 0–4)
RBC # BLD AUTO: 4.39 MILLION/UL (ref 3.88–5.62)
SODIUM SERPL-SCNC: 138 MMOL/L (ref 136–145)
WBC # BLD AUTO: 10.5 THOUSAND/UL (ref 4.31–10.16)

## 2022-02-24 PROCEDURE — 84153 ASSAY OF PSA TOTAL: CPT

## 2022-02-24 PROCEDURE — 80053 COMPREHEN METABOLIC PANEL: CPT

## 2022-02-24 PROCEDURE — 93005 ELECTROCARDIOGRAM TRACING: CPT

## 2022-02-24 PROCEDURE — 87086 URINE CULTURE/COLONY COUNT: CPT

## 2022-02-24 PROCEDURE — 85025 COMPLETE CBC W/AUTO DIFF WBC: CPT

## 2022-02-24 PROCEDURE — 36415 COLL VENOUS BLD VENIPUNCTURE: CPT

## 2022-02-25 LAB
ATRIAL RATE: 81 BPM
BACTERIA UR CULT: NORMAL
P AXIS: 24 DEGREES
PR INTERVAL: 246 MS
QRS AXIS: 38 DEGREES
QRSD INTERVAL: 72 MS
QT INTERVAL: 352 MS
QTC INTERVAL: 408 MS
T WAVE AXIS: 19 DEGREES
VENTRICULAR RATE: 81 BPM

## 2022-02-25 PROCEDURE — 93010 ELECTROCARDIOGRAM REPORT: CPT | Performed by: INTERNAL MEDICINE

## 2022-02-28 ENCOUNTER — TELEPHONE (OUTPATIENT)
Dept: UROLOGY | Facility: MEDICAL CENTER | Age: 72
End: 2022-02-28

## 2022-02-28 DIAGNOSIS — N39.0 RECURRENT UTI: ICD-10-CM

## 2022-02-28 DIAGNOSIS — N39.0 RECURRENT UTI: Primary | ICD-10-CM

## 2022-02-28 RX ORDER — NITROFURANTOIN 25; 75 MG/1; MG/1
100 CAPSULE ORAL 2 TIMES DAILY
Qty: 20 CAPSULE | Refills: 0
Start: 2022-02-28 | End: 2022-03-01 | Stop reason: SDUPTHER

## 2022-02-28 NOTE — PROGRESS NOTES
Called pt and left msg on VM to return call  No comm consent on file  When pt returns calls pls ask him for the name of the pharmacy he uses  Dr Lura Holstein has ordered an antibiotic for him to take prior to surgery starting tomorrow        Electronically signed by Phil Mas RN at 2/28/2022  2:22 PM

## 2022-02-28 NOTE — TELEPHONE ENCOUNTER
Called pt and left msg on VM to return call  No comm consent on file  When pt returns calls pls ask him for the name of the pharmacy he uses  Dr Frank Points has ordered an antibiotic for him to take prior to surgery starting tomorrow

## 2022-02-28 NOTE — TELEPHONE ENCOUNTER
----- Message from Rita Cruz MD sent at 2/28/2022  2:04 PM EST -----   What pharmacy does patient use? I wrote an order for an antibiotic to start taking tomorrow before surgery next Tuesday  So when you find the pharmacy, can you fax or e-mail that order to the pharmacy?   If too cumbersome, let me know and I will do a new order that I will E scribe

## 2022-03-01 RX ORDER — MELATONIN
1000
COMMUNITY

## 2022-03-01 RX ORDER — NITROFURANTOIN 25; 75 MG/1; MG/1
100 CAPSULE ORAL 2 TIMES DAILY
Qty: 20 CAPSULE | Refills: 0 | Status: ON HOLD | OUTPATIENT
Start: 2022-03-01 | End: 2022-03-08 | Stop reason: ALTCHOICE

## 2022-03-01 NOTE — TELEPHONE ENCOUNTER
Pt's [facility] is [facility] which is now noted in his chart  Forwarding to Dr Shelly Weaver to electronically send nitrofurantoin  Pt is aware he needs to start taking today

## 2022-03-01 NOTE — PRE-PROCEDURE INSTRUCTIONS
Pre-Surgery Instructions:   Medication Instructions    albuterol (PROVENTIL HFA,VENTOLIN HFA) 90 mcg/act inhaler Instructed patient per Anesthesia Guidelines  Take morning of surgery if needed    amLODIPine (NORVASC) 5 mg tablet Instructed patient per Anesthesia Guidelines  Take morning of surgery with sip water     calcium carbonate (OS-TEJ) 1250 (500 Ca) MG chewable tablet stop 3/1    cholecalciferol (VITAMIN D3) 1,000 units tablet Instructed patient per Anesthesia Guidelines  Do not take morning of surgery     ferrous sulfate 325 (65 Fe) mg tablet Instructed patient per Anesthesia Guidelines  Do not take morning of surgery    losartan (COZAAR) 100 MG tablet Instructed patient per Anesthesia Guidelines  Do not take morning of surgery    nitrofurantoin (MACROBID) 100 mg capsule Patient was instructed by Physician and understands  Take morning of surgery with sip water   Covid screening negative as per patient  Fully vaccinated  Reviewed pre op medicine and showering instructions with patient via phone call, verbalizes understanding  Advised patient to stop taking non prescribed vitamins, herbal meds and ASA as of 3/1  Advised to stop taking NSAID's 3 days pre op but may take Tylenol products if needed  Advised NPO after MN (3/7) and surgical services will call (3/7) with scheduled time of hospital arrival     Advised to abstain from smoking 24 hrs pre op  Pt  not interested in smoking cessation education

## 2022-03-01 NOTE — TELEPHONE ENCOUNTER
Patient called in regarding the antibiotic prescription  Original prescription set to "print"  Changed and reordered and "receipt confirmed by pharmacy"  Patient has been advised

## 2022-03-02 ENCOUNTER — NURSE TRIAGE (OUTPATIENT)
Dept: OTHER | Facility: OTHER | Age: 72
End: 2022-03-02

## 2022-03-02 ENCOUNTER — ANESTHESIA EVENT (OUTPATIENT)
Dept: PERIOP | Facility: HOSPITAL | Age: 72
End: 2022-03-02
Payer: COMMERCIAL

## 2022-03-02 NOTE — TELEPHONE ENCOUNTER
Patient is due for surgery TURBT 03/08/2022  Of his reaction to Macrobid  Which antibiotic would like to use, now?

## 2022-03-02 NOTE — TELEPHONE ENCOUNTER
Regarding: breathing issues, chills, stomach pains and diarrhea  ----- Message from Shannon Santiago sent at 3/2/2022  9:30 AM EST -----  " I am having breathing issues, chills, stomach pains and diarrhea "

## 2022-03-02 NOTE — TELEPHONE ENCOUNTER
Patient was seen in the office yesterday and prescribed Macrobid  After taking one dose, he became ill with labored breathing (improved), chills, stomach pains, and diarrhea  He was instructed to hold the medication and would like a call back for medication advice  Reason for Disposition   Caller wants to use a complementary or alternative medicine    Answer Assessment - Initial Assessment Questions  1  NAME of MEDICATION: "What medicine are you calling about?"      Macrobid   2  QUESTION: "What is your question?" (e g , medication refill, side effect)      Having side effects   3  PRESCRIBING HCP: "Who prescribed it?" Reason: if prescribed by specialist, call should be referred to that group  Urology (Dr Denys Vargas)   4  SYMPTOMS: "Do you have any symptoms?"      Labored breathing (subsided), chills, stomach pains, diarrhea   5  SEVERITY: If symptoms are present, ask "Are they mild, moderate or severe?"      Moderate-severe   6   PREGNANCY:  "Is there any chance that you are pregnant?" "When was your last menstrual period?"      N/A    Protocols used: MEDICATION QUESTION CALL-ADULT-[place]

## 2022-03-03 NOTE — TELEPHONE ENCOUNTER
Patient was told to call back in one hour, if he has not heard back from the provider  Please contact patient

## 2022-03-07 PROCEDURE — NC001 PR NO CHARGE: Performed by: UROLOGY

## 2022-03-07 NOTE — H&P
HISTORY AND PHYSICAL  ? ? Patient Name: Tanya Rosa  Patient MRN: [id number]  Attending Provider: Patricia Land MD  Service: Urology  Chief Complaint    Bladder neck contracture    HPI   Tanya Rosa is a 67 y o  male with prostate cancer, bladder neck contracture, and somewhat involved recent history:     1  Recurrent bladder neck contracture, has had several procedures both at the [place], in [place], and [facility] for this  Last office procedure at [address] [place] [address] [address] was November 2021, office cysto with attempted dilation, unsuccessful  Was recommended to have bladder neck  surgery at [facility], but chose to switch care to [facility]  2  Radical prostatectomy in 2005 in the [place]  3  Stress incontinence, has become worse over the past few years  He wears several pads per day  Daytime, does not really get much of a stream, just leaks  Nighttime, he will have some stream nocturia x2   4  He is on self catheterization every other day [address], but still does not feel that the stream is good  5  Recurrent UTI, just finished info antibiotics recently    6  He says most recent PSA was 0 19     1  Will schedule bladder neck contracture procedure, I discussed again that this can make a stress incontinence worse, he understands that  I plan cysto, bladder neck contracture incision  Patient understands this might make the stress incontinence worse  Potential risks and complications discussed, and informed consent was given by the patient  Medications  Meds/Allergies   No current facility-administered medications for this encounter  Cannot display prior to admission medications because the patient has not been admitted in this contact  No current facility-administered medications for this encounter      Current Outpatient Medications:     albuterol (PROVENTIL HFA,VENTOLIN HFA) 90 mcg/act inhaler, INHALE 2 PUFFS BY MOUTH EVERY FOUR HOURS HRLY AS NEEDED FOR BREATHING, Disp: , Rfl:     amLODIPine (NORVASC) 5 mg tablet, Take 1 tablet (5 mg total) by mouth 2 (two) times a day (Patient taking differently: Take 10 mg by mouth daily  ), Disp: 60 tablet, Rfl: 5    calcium carbonate (OS-TEJ) 1250 (500 Ca) MG chewable tablet, Chew 1 tablet, Disp: , Rfl:     cholecalciferol (VITAMIN D3) 1,000 units tablet, Take 1,000 Units by mouth daily at bedtime, Disp: , Rfl:     ferrous sulfate 325 (65 Fe) mg tablet, Take 325 mg by mouth daily at bedtime  , Disp: , Rfl:     losartan (COZAAR) 100 MG tablet, TAKE ONE-HALF TABLET BY MOUTH EVERY DAY FOR BLOOD PRESSURE/HEART, Disp: , Rfl:     nitrofurantoin (MACROBID) 100 mg capsule, Take 1 capsule (100 mg total) by mouth 2 (two) times a day for 10 days, Disp: 20 capsule, Rfl: 0    albuterol (PROVENTIL HFA,VENTOLIN HFA) 90 mcg/act inhaler, Inhale 2 puffs every 6 (six) hours as needed, Disp: , Rfl:     Cholecalciferol 1000 units CHEW, Chew, Disp: , Rfl:     cyanocobalamin 500 MCG tablet, Take 500 mcg by mouth daily, Disp: , Rfl:     ezetimibe (ZETIA) 10 mg tablet, TAKE ONE-HALF TABLET BY MOUTH DAILY FOR CHOLESTEROL, Disp: , Rfl:     rosuvastatin (CRESTOR) 5 mg tablet, TAKE ONE-HALF TABLET BY MOUTH ONCE WEEKLY FOR CHOLESTEROL, Disp: , Rfl:   Review of Systems  10 point review of systems negative except as noted in HPI  Allergies  Allergies   Allergen Reactions    Aspirin Other (See Comments)     Polyps in sinuses     Penicillins Rash    Rosuvastatin Myalgia    Sulfa Antibiotics Rash     PMH  Past Medical History:   Diagnosis Date    Anemia     Arthritis     [facility]     prostate    COPD (chronic obstructive pulmonary disease) ([facility]     History of transfusion 2005    auto transfusion    Hyperlipidemia     Hypertension     Leaky heart valve     Lung nodules     unsure of dx    PVD (peripheral vascular disease) ([facility]     Tobacco abuse     UTI (urinary tract infection)      Past surgical history  Past Surgical History:   Procedure Laterality Date    COLONOSCOPY      HERNIA REPAIR      with mesh  PROSTATECTOMY      TONSILLECTOMY       Social history  Social History     Tobacco Use    Smoking status: Current Every Day Smoker     Packs/day: 1 00     Types: Cigarettes    Smokeless tobacco: Never Used   Substance Use Topics    Alcohol use: Yes     Comment: 6 pk/wk    Drug use: No     ?  Physical Exam     vs  General appearance: alert and oriented, in no acute distress  Head: Normocephalic, without obvious abnormality, atraumatic  Neck: no adenopathy, no carotid bruit, no JVD, supple, symmetrical, trachea midline and thyroid not enlarged, symmetric, no tenderness/mass/nodules  Lungs: clear to auscultation bilaterally  Heart: regular rate and rhythm, S1, S2 normal, no murmur, click, rub or gallop  Abdomen: soft, non-tender; bowel sounds normal; no masses,  no organomegaly  Extremities: extremities normal, warm and well-perfused; no cyanosis, clubbing, or edema  Vernon MD Aurelia

## 2022-03-08 ENCOUNTER — ANESTHESIA (OUTPATIENT)
Dept: PERIOP | Facility: HOSPITAL | Age: 72
End: 2022-03-08
Payer: COMMERCIAL

## 2022-03-08 ENCOUNTER — HOSPITAL ENCOUNTER (OUTPATIENT)
Facility: HOSPITAL | Age: 72
Setting detail: OUTPATIENT SURGERY
Discharge: HOME | End: 2022-03-08
Attending: UROLOGY | Admitting: UROLOGY
Payer: COMMERCIAL

## 2022-03-08 VITALS
HEART RATE: 72 BPM | SYSTOLIC BLOOD PRESSURE: 168 MMHG | RESPIRATION RATE: 18 BRPM | BODY MASS INDEX: 25.26 KG/M2 | WEIGHT: 157.19 LBS | HEIGHT: 66 IN | TEMPERATURE: 97.6 F | OXYGEN SATURATION: 95 % | DIASTOLIC BLOOD PRESSURE: 73 MMHG

## 2022-03-08 DIAGNOSIS — N32.0 BLADDER NECK CONTRACTURE: Primary | ICD-10-CM

## 2022-03-08 PROCEDURE — 99024 POSTOP FOLLOW-UP VISIT: CPT | Performed by: UROLOGY

## 2022-03-08 PROCEDURE — 52500 TRURL RESECTION BLADDER NECK: CPT | Performed by: UROLOGY

## 2022-03-08 RX ORDER — ONDANSETRON 2 MG/ML
4 INJECTION INTRAMUSCULAR; INTRAVENOUS ONCE AS NEEDED
Status: DISCONTINUED | OUTPATIENT
Start: 2022-03-08 | End: 2022-03-08 | Stop reason: HOSPADM

## 2022-03-08 RX ORDER — SODIUM CHLORIDE, SODIUM LACTATE, POTASSIUM CHLORIDE, CALCIUM CHLORIDE 600; 310; 30; 20 MG/100ML; MG/100ML; MG/100ML; MG/100ML
125 INJECTION, SOLUTION INTRAVENOUS CONTINUOUS
Status: DISCONTINUED | OUTPATIENT
Start: 2022-03-08 | End: 2022-03-08 | Stop reason: HOSPADM

## 2022-03-08 RX ORDER — CEFAZOLIN SODIUM 2 G/50ML
2000 SOLUTION INTRAVENOUS ONCE
Status: COMPLETED | OUTPATIENT
Start: 2022-03-08 | End: 2022-03-08

## 2022-03-08 RX ORDER — ONDANSETRON 2 MG/ML
INJECTION INTRAMUSCULAR; INTRAVENOUS AS NEEDED
Status: DISCONTINUED | OUTPATIENT
Start: 2022-03-08 | End: 2022-03-08

## 2022-03-08 RX ORDER — FENTANYL CITRATE/PF 50 MCG/ML
25 SYRINGE (ML) INJECTION
Status: DISCONTINUED | OUTPATIENT
Start: 2022-03-08 | End: 2022-03-08 | Stop reason: HOSPADM

## 2022-03-08 RX ORDER — MAGNESIUM HYDROXIDE 1200 MG/15ML
LIQUID ORAL AS NEEDED
Status: DISCONTINUED | OUTPATIENT
Start: 2022-03-08 | End: 2022-03-08 | Stop reason: HOSPADM

## 2022-03-08 RX ORDER — PROPOFOL 10 MG/ML
INJECTION, EMULSION INTRAVENOUS AS NEEDED
Status: DISCONTINUED | OUTPATIENT
Start: 2022-03-08 | End: 2022-03-08

## 2022-03-08 RX ORDER — MIDAZOLAM HYDROCHLORIDE 2 MG/2ML
INJECTION, SOLUTION INTRAMUSCULAR; INTRAVENOUS AS NEEDED
Status: DISCONTINUED | OUTPATIENT
Start: 2022-03-08 | End: 2022-03-08

## 2022-03-08 RX ORDER — OXYCODONE HYDROCHLORIDE AND ACETAMINOPHEN 5; 325 MG/1; MG/1
1 TABLET ORAL EVERY 4 HOURS PRN
Status: DISCONTINUED | OUTPATIENT
Start: 2022-03-08 | End: 2022-03-08 | Stop reason: HOSPADM

## 2022-03-08 RX ORDER — LIDOCAINE HYDROCHLORIDE 20 MG/ML
INJECTION, SOLUTION EPIDURAL; INFILTRATION; INTRACAUDAL; PERINEURAL AS NEEDED
Status: DISCONTINUED | OUTPATIENT
Start: 2022-03-08 | End: 2022-03-08

## 2022-03-08 RX ORDER — HYDROMORPHONE HCL/PF 1 MG/ML
0.5 SYRINGE (ML) INJECTION
Status: DISCONTINUED | OUTPATIENT
Start: 2022-03-08 | End: 2022-03-08 | Stop reason: HOSPADM

## 2022-03-08 RX ORDER — CEFUROXIME AXETIL 250 MG/1
250 TABLET ORAL EVERY 12 HOURS SCHEDULED
Qty: 10 TABLET | Refills: 0 | Status: SHIPPED | OUTPATIENT
Start: 2022-03-08 | End: 2022-03-13

## 2022-03-08 RX ORDER — FENTANYL CITRATE 50 UG/ML
INJECTION, SOLUTION INTRAMUSCULAR; INTRAVENOUS AS NEEDED
Status: DISCONTINUED | OUTPATIENT
Start: 2022-03-08 | End: 2022-03-08

## 2022-03-08 RX ORDER — OXYCODONE HYDROCHLORIDE AND ACETAMINOPHEN 5; 325 MG/1; MG/1
2 TABLET ORAL EVERY 4 HOURS PRN
Status: DISCONTINUED | OUTPATIENT
Start: 2022-03-08 | End: 2022-03-08 | Stop reason: HOSPADM

## 2022-03-08 RX ADMIN — FENTANYL CITRATE 25 MCG: 50 INJECTION, SOLUTION INTRAMUSCULAR; INTRAVENOUS at 15:50

## 2022-03-08 RX ADMIN — LIDOCAINE HYDROCHLORIDE 100 MG: 20 INJECTION, SOLUTION EPIDURAL; INFILTRATION; INTRACAUDAL; PERINEURAL at 15:16

## 2022-03-08 RX ADMIN — FENTANYL CITRATE 25 MCG: 50 INJECTION, SOLUTION INTRAMUSCULAR; INTRAVENOUS at 15:28

## 2022-03-08 RX ADMIN — SODIUM CHLORIDE, SODIUM LACTATE, POTASSIUM CHLORIDE, AND CALCIUM CHLORIDE 125 ML/HR: .6; .31; .03; .02 INJECTION, SOLUTION INTRAVENOUS at 13:51

## 2022-03-08 RX ADMIN — CEFAZOLIN SODIUM 2000 MG: 2 SOLUTION INTRAVENOUS at 15:06

## 2022-03-08 RX ADMIN — ONDANSETRON 4 MG: 2 INJECTION INTRAMUSCULAR; INTRAVENOUS at 16:00

## 2022-03-08 RX ADMIN — MIDAZOLAM HYDROCHLORIDE 2 MG: 2 INJECTION, SOLUTION INTRAMUSCULAR; INTRAVENOUS at 15:10

## 2022-03-08 RX ADMIN — SODIUM CHLORIDE, SODIUM LACTATE, POTASSIUM CHLORIDE, AND CALCIUM CHLORIDE 125 ML/HR: .6; .31; .03; .02 INJECTION, SOLUTION INTRAVENOUS at 16:42

## 2022-03-08 RX ADMIN — PROPOFOL 200 MG: 10 INJECTION, EMULSION INTRAVENOUS at 15:17

## 2022-03-08 RX ADMIN — FENTANYL CITRATE 25 MCG: 50 INJECTION, SOLUTION INTRAMUSCULAR; INTRAVENOUS at 15:24

## 2022-03-08 RX ADMIN — FENTANYL CITRATE 25 MCG: 50 INJECTION, SOLUTION INTRAMUSCULAR; INTRAVENOUS at 15:39

## 2022-03-08 NOTE — INTERVAL H&P NOTE
H&P reviewed  After examining the patient I find no changes in the patients condition since the H&P had been written      Vitals:    03/08/22 1342   BP: 150/65   Pulse: 79   Resp: 14   Temp: 98 6 °F (37 °C)   SpO2: 96%

## 2022-03-08 NOTE — DISCHARGE INSTRUCTIONS
ALL YOUR  PREVIOUS MEDS ARE THE SAME  NEW MEDS:  Cefuroxime antibiotic, twice per day for five days    You can get in the shower with the catheter    EXPECT SOME BLOOD IN URINE, BURNING, FREQUENT URINATION  ACTIVITY:  RESUME FULL ACTIVITY after catheter is out  Scherer Catheter Placement and Care   WHAT YOU NEED TO KNOW:   A Scherer catheter is a sterile tube that is inserted into your bladder to drain urine  It is also called an indwelling urinary catheter  DISCHARGE INSTRUCTIONS:   Seek care immediately if:   · Your catheter comes out  · You suddenly have material that looks like sand in the tubing or drainage bag  · No urine is draining into the bag and you have checked the system  · You have pain in your hip, back, pelvis, or lower abdomen  · You are confused or cannot think clearly  Call your doctor or urologist if:   · You have a fever  · You have bladder spasms for more than 1 day after the catheter is placed  · You see blood in the tubing or drainage bag  · You have a rash or itching where the catheter tube is secured to your skin  · Urine leaks from or around the catheter, tubing, or drainage bag  · The closed drainage system has accidently come open or apart  · You see a layer of crystals inside the tubing  · You have questions or concerns about your condition or care  Care for your catheter and drainage bag: You can reduce your risk for infection and injury by caring for your catheter and drainage bag properly  · Wash your hands often  Wash before and after you touch your catheter, tubing, or drainage bag  Use soap and water  Wear clean disposable gloves when you care for your catheter or disconnect the drainage bag  Wash your hands before you prepare or eat food  · Clean your genital area 2 times every day  Clean your catheter area and anal opening after every bowel movement  ? For men:  Use a soapy cloth to clean the tip of your penis  Start where the catheter enters  Wipe backward making sure to pull back the foreskin  Then use a cloth with clear water in the same direction to clean away the soap  ? For women:  Use a soapy cloth to clean the area that the catheter enters your body  Make sure to separate your labia and wipe toward the anus  Then use a cloth with clear water and wipe in the same direction  · Secure the catheter tube  so you do not pull or move the catheter  This helps prevent pain and bladder spasms  Healthcare providers will show you how to use medical tape or a strap to secure the catheter tube to your body  · Keep a closed drainage system  Your catheter should always be attached to the drainage bag to form a closed system  Do not disconnect any part of the closed system unless you need to change the bag  · Keep the drainage bag below the level of your waist   This helps stop urine from moving back up the tubing and into your bladder  Do not loop or kink the tubing  This can cause urine to back up and collect in your bladder  Do not let the drainage bag touch or lie on the floor  · Empty the drainage bag when needed  The weight of a full drainage bag can be painful  Empty the drainage bag every 3 to 6 hours or when it is ? full  · Clean and change the drainage bag as directed  Ask your healthcare provider how often you should change the drainage bag and what cleaning solution to use  Wear disposable gloves when you change the bag  Do not allow the end of the catheter or tubing to touch anything  Clean the ends with an alcohol pad before you reconnect them  What to do if problems develop:   · No urine is draining into the bag:      ? Check for kinks in the tubing and straighten them out  ? Check the tape or strap used to secure the catheter tube to your skin  Make sure it is not blocking the tube  ? Make sure you are not sitting or lying on the tubing      ? Make sure the urine bag is hanging below the level of your waist     · Urine leaks from or around the catheter, tubing, or drainage bag:  Check if the closed drainage system has accidently come open or apart  Clean the catheter and tubing ends with a new alcohol pad and reconnect them  Follow up with your doctor or urologist as directed:  Write down your questions so you remember to ask them during your visits  © Copyright [employer] 2022 Information is for End User's use only and may not be sold, redistributed or otherwise used for commercial purposes  All illustrations and images included in CareNotes® are the copyrighted property of [employer]  or [address] [place], [place] [address]antonio   The above information is an  only  It is not intended as medical advice for individual conditions or treatments  Talk to your doctor, nurse or pharmacist before following any medical regimen to see if it is safe and effective for you

## 2022-03-08 NOTE — ANESTHESIA PREPROCEDURE EVALUATION
Procedure:  RESECTION TRANSURETHRAL BLADDER NECK CONTRACTURE (N/A Bladder)    Relevant Problems   CARDIO   (+) Aortic valve stenosis, mild   (+) Hypertension   (+) Shortness of breath on exertion      /RENAL   (+) Acute kidney failure (HCC)      HEMATOLOGY   (+) Anemia      NEURO/PSYCH   (+) History of prostate cancer      PULMONARY   (+) Shortness of breath on exertion                Procedure:  RESECTION TRANSURETHRAL BLADDER NECK CONTRACTURE (N/A Bladder)    Relevant Problems   CARDIO   (+) Aortic valve stenosis, mild   (+) Hypertension   (+) Shortness of breath on exertion      /RENAL   (+) Acute kidney failure (HCC)      HEMATOLOGY   (+) Anemia      NEURO/PSYCH   (+) History of prostate cancer      PULMONARY   (+) Shortness of breath on exertion        Physical Exam    Airway    Mallampati score: II  TM Distance: >3 FB  Neck ROM: full     Dental   No notable dental hx     Cardiovascular  Rhythm: regular, Rate: normal, Cardiovascular exam normal    Pulmonary  Breath sounds clear to auscultation, Decreased breath sounds,     Other Findings        Anesthesia Plan  ASA Score- 3     Anesthesia Type- general with ASA Monitors  Additional Monitors:   Airway Plan: LMA  Plan Factors-    Chart reviewed  Patient summary reviewed  Patient is a current smoker  Patient instructed to abstain from smoking on day of procedure  Patient did not smoke on day of surgery  Induction- intravenous  Postoperative Plan-     Informed Consent- Anesthetic plan and risks discussed with patient and spouse

## 2022-03-08 NOTE — DISCHARGE SUMMARY
Discharge Summary - Danuta Beatty 67 y o  male MRN: [id number]    Admission Date: 3/8/2022     Admitting Diagnosis: Bladder neck contracture [N32 0]    Procedures Performed:  Bladder neck contracture incision    Patient underwent planned outpt surgery and recovered without complication  Discharged in good condition  Medications were prescribed  Pt knows to have office follow-up at the appropriate time

## 2022-03-08 NOTE — OP NOTE
OPERATIVE REPORT  PATIENT NAME: Jay Rajput    :  1950  MRN: [id number]  Pt Location: [facility] OR ROOM 03    SURGERY DATE: 3/8/2022    Surgeon(s) and Role:     * Marie Duarte MD - Primary    Preop Diagnosis:  Bladder neck contracture [N32 0]    Post-Op Diagnosis Codes: * Bladder neck contracture [N32 0]    Procedure(s) (LRB):  RESECTION TRANSURETHRAL BLADDER NECK CONTRACTURE (N/A)    Specimen(s):  * No specimens in log *    Estimated Blood Loss:   Minimal    Drains:  Urethral Catheter Latex 22 Fr  (Active)   Number of days: 0       Anesthesia Type:   General/LMA    Operative Indications:  Bladder neck contracture [N32 0]      Operative Findings:  Tight bladder neck contracture, bladder with chronic obstructive changes, trabeculation, diverticula  No tumors or stones  Complications:   None    Procedure and Technique:  After the patient was placed under adequate general anesthesia, was prepped and draped using chlorhexidine solution in lithotomy position  The 25 Belgian scope was passed without difficulty in the urethra which had no strictures  Prostate was absent from radical prostatectomy long ago  There is a moderately tight bladder neck contracture  The resectoscope was introduced, and the Leonel knife was used, but did not adequately cut  I switch back to the cystoscope, and used a Bugbee electrode to incise the contracture at the 6:00 a m  And 12 o'clock position  The scope was then passed into the bladder, showing no significant lesions    The scope was then removed  Twenty-two Belgian catheter passed  Patient taken to recovery good condition     I was present for the entire procedure    Patient Disposition:  PACU       SIGNATURE: Marie Duarte MD  DATE: 2022  TIME: 3:57 PM

## 2022-03-09 NOTE — TELEPHONE ENCOUNTER
Pt is calling today to speak with a nurse regarding cath removal from post op[  Pt explained he is feeling well after surgery yesterday       Pt can be reached at [phone]

## 2022-03-09 NOTE — ANESTHESIA POSTPROCEDURE EVALUATION
Post-Op Assessment Note    CV Status:  Stable    Pain management: adequate     Mental Status:  Alert and awake   Hydration Status:  Euvolemic   PONV Controlled:  Controlled   Airway Patency:  Patent      Post Op Vitals Reviewed: Yes      Staff: Anesthesiologist         No complications documented      BP      Temp      Pulse     Resp      SpO2      /73   Pulse 72   Temp 97 6 °F (36 4 °C) (Temporal)   Resp 18   Ht 5' 6" (1 676 m)   Wt 71 3 kg (157 lb 3 oz)   SpO2 95%   BMI 25 37 kg/m²

## 2022-03-09 NOTE — TELEPHONE ENCOUNTER
Post Op Note    Sonya Cadet is a 67 y o  male s/p RESECTION TRANSURETHRAL BLADDER NECK CONTRACTURE (N/A Bladder)  performed 3-8-2022  Sonya Cadet is a patient of Dr Chelsea Purvis and is seen at the [facility] office  How would you rate your pain on a scale from 1 to 10, 10 being the worst pain ever? 3  Have you had a fever? No  Do you have any difficulty urinating? No  If the patient has a de la fuente- are you comfortable caring for your de la fuente? Yes Is it draining urine? Yes       Do you have any other questions or concerns that I can address at this time? Spoke with patient who stated that he is doing well s/p procedure yesterday  His de la fuente is draining yellow urine and his pain is well managed  Pt has no concerns at this time  Pt is scheduled in the [facility] office on 3- at 9am for de la fuente catheter removal  Pt confirmed this appointment

## 2022-03-10 ENCOUNTER — TELEPHONE (OUTPATIENT)
Dept: UROLOGY | Facility: AMBULATORY SURGERY CENTER | Age: 72
End: 2022-03-10

## 2022-03-10 NOTE — TELEPHONE ENCOUNTER
Pt called in saying he needs to r/s his appt on 3/15 w/ the nurse       Any day would work for him except the 15th, he can be called back at [phone]

## 2022-03-10 NOTE — TELEPHONE ENCOUNTER
Call placed to patient and spoke with him  Pt's appointment rescheduled to 3- with the nurse at [address] [place] [address] for de la fuente catheter removal  Pt confirmed this appointment

## 2022-03-11 RX ORDER — EZETIMIBE 10 MG/1
TABLET ORAL
COMMUNITY
Start: 2021-12-13

## 2022-03-14 ENCOUNTER — PROCEDURE VISIT (OUTPATIENT)
Dept: UROLOGY | Facility: MEDICAL CENTER | Age: 72
End: 2022-03-14

## 2022-03-14 VITALS
WEIGHT: 155 LBS | HEART RATE: 88 BPM | SYSTOLIC BLOOD PRESSURE: 132 MMHG | HEIGHT: 66 IN | DIASTOLIC BLOOD PRESSURE: 64 MMHG | BODY MASS INDEX: 24.91 KG/M2

## 2022-03-14 DIAGNOSIS — N32.0 BLADDER NECK CONTRACTURE: Primary | ICD-10-CM

## 2022-03-14 PROCEDURE — 99024 POSTOP FOLLOW-UP VISIT: CPT

## 2022-03-14 NOTE — PROGRESS NOTES
3/14/2022    [facility]  1950  [id number]    Diagnosis  Chief Complaint     bladder neck contracture          Patient presents for de la fuente catheter removal s/p RESECTION TRANSURETHRAL BLADDER NECK CONTRACTURE (N/A Bladder) managed by Dr Debbie Rjoas  Return to the office as scheduled for follow up s/p procedure  Procedure De La Fuente removal    De La Fuente catheter removed after deflation of an intact balloon  Patient tolerated well  Encouraged patient to hydrate well  Patient agrees to this plan  Pt is aware he can contact the office with any questions or concerns he should have  ER precautions were reviewed with the patient should he have any difficulty urinating  Pt verbalized his understanding and will contact the office with any concerns           Vitals:    03/14/22 0847   BP: 132/64   BP Location: Left arm   Patient Position: Sitting   Cuff Size: Standard   Pulse: 88   Weight: 70 3 kg (155 lb)   Height: 5' 6" (1 676 m)           Tonny Hensley RN

## 2022-06-16 ENCOUNTER — OFFICE VISIT (OUTPATIENT)
Dept: CARDIOLOGY CLINIC | Facility: CLINIC | Age: 72
End: 2022-06-16
Payer: COMMERCIAL

## 2022-06-16 VITALS
WEIGHT: 150 LBS | HEIGHT: 66 IN | BODY MASS INDEX: 24.11 KG/M2 | HEART RATE: 62 BPM | SYSTOLIC BLOOD PRESSURE: 130 MMHG | DIASTOLIC BLOOD PRESSURE: 50 MMHG

## 2022-06-16 DIAGNOSIS — I35.0 MODERATE AORTIC VALVE STENOSIS: ICD-10-CM

## 2022-06-16 DIAGNOSIS — I10 PRIMARY HYPERTENSION: Primary | ICD-10-CM

## 2022-06-16 DIAGNOSIS — R06.02 SHORTNESS OF BREATH ON EXERTION: ICD-10-CM

## 2022-06-16 DIAGNOSIS — Z72.0 TOBACCO ABUSE: ICD-10-CM

## 2022-06-16 DIAGNOSIS — N18.32 STAGE 3B CHRONIC KIDNEY DISEASE (HCC): ICD-10-CM

## 2022-06-16 PROBLEM — N18.30 STAGE 3 CHRONIC KIDNEY DISEASE (HCC): Status: ACTIVE | Noted: 2022-06-16

## 2022-06-16 PROCEDURE — 99215 OFFICE O/P EST HI 40 MIN: CPT | Performed by: INTERNAL MEDICINE

## 2022-06-16 NOTE — PATIENT INSTRUCTIONS
CARDIOLOGY ASSESSMENT & PLAN     1  Primary hypertension     2  Moderate aortic valve stenosis     3  Tobacco abuse     4  Shortness of breath on exertion     5  Stage 3b chronic kidney disease (HCC)       Moderate aortic valve stenosis  Mr Kameron Rutledge is overall stable from cardiac perspective with no recent symptoms that suggest angina or heart failure  He or severe aortic valve stenosis  His exercise tolerance is limited but stable  On examination he has mild to moderate intensity murmur relating to his aortic valve  His blood pressure is reasonably well controlled and he is on good medical regimen  His last blood work it at the was suggestive of suboptimally controlled lipids and elevated creatinine at 1 42     -- at this time I would like to continue his current medications  -- I am really routine for him to quit smoking as he intends to do with the help of we a smoking cessation program   -- I would like to see him back in 6 months time  We will consider repeating echocardiogram following next visit or sometime next year  -- I am advising him to continue to stay active and eat healthy and report to us if he develops any concerning symptoms such as chest pain, shortness of breath, decline in exercise tolerance or presyncope/syncope  -- he will continue to follow with his vascular surgeons at [facility]  He may need a carotid ultrasound to assess his carotid artery disease  -- we are adding nonpharmacologic measures to improve his lipids some of which are outlined below  NON-PHARMACOLOGIC MEASURES THAT IMPROVE CHOLESTEROL     1  Reduce intake of saturated fats to less than 7% of total calories  Some helpful tips to achieve this include:        -- use egg whites instead of whole eggs  -- use lean turkey-odonnell instead of regular odonnell        -- use low-fat margarine on your toast  instead of butter  -- use skim milk instead of whole milk          -- use Soy-based products as such as Soy milk, Tofu,    2  Reduced intake of dietary cholesterol to less than 200 mg per day  3  Increase intake of plant sterols and  viscous soluble fiber  -- Plant sterols and stanols (active daily doses, 400-3000 mg; expected LDL-C reduction of 8%-12%)  Plant sterols and stanols put in fat medium such as Margarine have been shown to lower LDL cholesterol ('Benecol', 'Smart Balance', Take Control' and similar products)  --   Soluble fibers including beta-glucan, psyllium, and glucomannan (active daily doses, 5-15 g; expected LDL-C reduction of 5%-15%)          Soluble viscous fibers such as  Oat ?-glucan (Oat Fiber) and Psyllium husk ( Metamucil etc ) lower cholesterol by reducing absorption of cholesterol bile acids and           delaying digestion of lipids  4  Increase consumption of nuts, especially as a replacement for candy bars or other high simple-carbohydrate foods (Nuts, especially walnuts and almonds are rich in mono or polyunsaturated fats that lower LDL cholesterol  They also reduced the glycemic load of the diet and may reduce insulin secretion and improve satiety  There also rich in a my no acid arginine, which is the precursor of nitric oxide that promote vasodilation )    5  Reduce weight through increasing physical activity/exercise and modifying diet  6  Increase dietary intake of Omega 3 fatty acids ( found predominantly in fatty fish such as [place]/Tuna/Mackeral/Sardines/Herring as well as fish oil supplements)  Omega 3 fatty acids reduce plasma concentrations of triglycerides and decreased overall risk for coronary heart disease  7  Limit alcohol intake and frequency to minimum  8  Consider taking Nutraceutical supplements that may further lower blood cholesterol:    1   Berberine (active daily doses, 500-1500 mg; expected LDL-C reduction of 36%-05%);     2  Garlic (active daily doses, 5-6 g; expected LDL-C reduction of 5%-10%);    3  Phong Jolly tea extracts (active daily doses,  g; expected LDL-C reduction of 5%)

## 2022-06-16 NOTE — ASSESSMENT & PLAN NOTE
Mr Kaylin Pool is overall stable from cardiac perspective with no recent symptoms that suggest angina or heart failure  He or severe aortic valve stenosis  His exercise tolerance is limited but stable  On examination he has mild to moderate intensity murmur relating to his aortic valve  His blood pressure is reasonably well controlled and he is on good medical regimen  His last blood work it at the was suggestive of suboptimally controlled lipids and elevated creatinine at 1 42     -- at this time I would like to continue his current medications  -- I am really routine for him to quit smoking as he intends to do with the help of we a smoking cessation program   -- I would like to see him back in 6 months time  We will consider repeating echocardiogram following next visit or sometime next year  -- I am advising him to continue to stay active and eat healthy and report to us if he develops any concerning symptoms such as chest pain, shortness of breath, decline in exercise tolerance or presyncope/syncope  -- he will continue to follow with his vascular surgeons at [facility]  He may need a carotid ultrasound to assess his carotid artery disease  -- we are adding nonpharmacologic measures to improve his lipids some of which are outlined below  NON-PHARMACOLOGIC MEASURES THAT IMPROVE CHOLESTEROL     1  Reduce intake of saturated fats to less than 7% of total calories  Some helpful tips to achieve this include:        -- use egg whites instead of whole eggs  -- use lean turkey-odonnell instead of regular odonnell        -- use low-fat margarine on your toast  instead of butter  -- use skim milk instead of whole milk  -- use Soy-based products as such as Soy milk, Tofu,    2  Reduced intake of dietary cholesterol to less than 200 mg per day  3  Increase intake of plant sterols and  viscous soluble fiber      -- Plant sterols and stanols (active daily doses, 400-3000 mg; expected LDL-C reduction of 8%-12%)  Plant sterols and stanols put in fat medium such as Margarine have been shown to lower LDL cholesterol ('Benecol', 'Smart Balance', Take Control' and similar products)  --   Soluble fibers including beta-glucan, psyllium, and glucomannan (active daily doses, 5-15 g; expected LDL-C reduction of 5%-15%)          Soluble viscous fibers such as  Oat ?-glucan (Oat Fiber) and Psyllium husk ( Metamucil etc ) lower cholesterol by reducing absorption of cholesterol bile acids and           delaying digestion of lipids  4  Increase consumption of nuts, especially as a replacement for candy bars or other high simple-carbohydrate foods (Nuts, especially walnuts and almonds are rich in mono or polyunsaturated fats that lower LDL cholesterol  They also reduced the glycemic load of the diet and may reduce insulin secretion and improve satiety  There also rich in a my no acid arginine, which is the precursor of nitric oxide that promote vasodilation )    5  Reduce weight through increasing physical activity/exercise and modifying diet  6  Increase dietary intake of Omega 3 fatty acids ( found predominantly in fatty fish such as [place]/Tuna/Mackeral/Sardines/Herring as well as fish oil supplements)  Omega 3 fatty acids reduce plasma concentrations of triglycerides and decreased overall risk for coronary heart disease  7  Limit alcohol intake and frequency to minimum  8  Consider taking Nutraceutical supplements that may further lower blood cholesterol:    1   Berberine (active daily doses, 500-1500 mg; expected LDL-C reduction of 52%-43%);     2  Garlic (active daily doses, 5-6 g; expected LDL-C reduction of 5%-10%);    3  Green tea extracts (active daily doses,  g; expected LDL-C reduction of 5%)

## 2022-06-16 NOTE — PROGRESS NOTES
[facility]  [address] [address] [place] [address]  Phone#  [phone]  Fax#  [phone]  *-*-*-*-*-*-*-*-*-*-*-*-*-*-*-*-*-*-*-*-*-*-*-*-*-*-*-*-*-*-*-*-*-*-*-*-*-*-*-*-*-*-*-*-*-*-*-*-*-*-*-*-*-*  Chiqui Patricio DATE: 06/16/22 3:30 PM  PATIENT NAME: Pamela Newton   1950    [id number]  AGE:72 y o  SEX: male  Devendra Hunt MD     PRIMARY CARE PHYSICIAN: Janna Costello MD    DIAGNOSES:  1  Primary hypertension  2  Peripheral artery disease, status post right SFA atherectomy with angioplasty and stent placement in April 2021   3  Aortic valve stenosis  4  History of lung nodules and  5  History of chronic tobacco abuse  6  Remote history of rib fracture on the left side  7  Iron deficiency anemia   8  Suspected peripheral artery disease with intermittent claudication  10  History of prostrate CA and BPH  11  Anemia  12  Degenerative joint disease of the spine  13  Carotid artery disease with 50-69% left ICA stenosis and less than 50% right ICA stenosis in November 2021    ECHOCARDIOGRAM July 7, 2021: Normal left ventricular size and systolic function, indeterminate diastolic function, EF 88-64%, normal right ventricular size and systolic function, trileaflet aortic valve with calcification and immobility of right coronary cusp  Moderate aortic valve stenosis with calculated aortic valve area 1 1 cm2, dimensionless index 0 31, no aortic valve regurgitation, trace pulmonic valve regurgitation, trace mitral valve regurgitation, no tricuspid valve regurgitation, no pulmonary hypertension, no pericardial effusion  Peak transaortic velocity was 2 24 m/sec and mean gradient was 9 mmHg      ECHOCARDIOGRAM October 4, 2019:  Normal left ventricular size and systolic function, EF around 70%, grade 1 diastolic dysfunction, trace mitral valve regurgitation, calcific aortic valve with reduced cuspal separation, mild aortic valve stenosis with peak velocity of 2 04 and mean gradient of 8 8 and calculated aortic valve area of 1 8 cm2, trace pulmonic valve regurgitation, mildly dilated ascending aorta  CURRENT ECG   No results found for this visit on 06/16/22  CARDIOLOGY ASSESSMENT & PLAN     1  Primary hypertension     2  Moderate aortic valve stenosis     3  Tobacco abuse     4  Shortness of breath on exertion     5  Stage 3b chronic kidney disease (HCC)       Moderate aortic valve stenosis  Mr Cece Avila is overall stable from cardiac perspective with no recent symptoms that suggest angina or heart failure  He or severe aortic valve stenosis  His exercise tolerance is limited but stable  On examination he has mild to moderate intensity murmur relating to his aortic valve  His blood pressure is reasonably well controlled and he is on good medical regimen  His last blood work it at the was suggestive of suboptimally controlled lipids and elevated creatinine at 1 42     -- at this time I would like to continue his current medications  -- I am really routine for him to quit smoking as he intends to do with the help of we a smoking cessation program   -- I would like to see him back in 6 months time  We will consider repeating echocardiogram following next visit or sometime next year  -- I am advising him to continue to stay active and eat healthy and report to us if he develops any concerning symptoms such as chest pain, shortness of breath, decline in exercise tolerance or presyncope/syncope  -- he will continue to follow with his vascular surgeons at [facility]  He may need a carotid ultrasound to assess his carotid artery disease  -- we are adding nonpharmacologic measures to improve his lipids some of which are outlined below  NON-PHARMACOLOGIC MEASURES THAT IMPROVE CHOLESTEROL     1  Reduce intake of saturated fats to less than 7% of total calories    Some helpful tips to achieve this include:        -- use egg whites instead of whole eggs         -- use lean turkey-odonnell instead of regular odonnell        -- use low-fat margarine on your toast  instead of butter  -- use skim milk instead of whole milk  -- use Soy-based products as such as Soy milk, Tofu,    2  Reduced intake of dietary cholesterol to less than 200 mg per day  3  Increase intake of plant sterols and  viscous soluble fiber  -- Plant sterols and stanols (active daily doses, 400-3000 mg; expected LDL-C reduction of 8%-12%)  Plant sterols and stanols put in fat medium such as Margarine have been shown to lower LDL cholesterol ('Benecol', 'Smart Balance', Take Control' and similar products)  --   Soluble fibers including beta-glucan, psyllium, and glucomannan (active daily doses, 5-15 g; expected LDL-C reduction of 5%-15%)          Soluble viscous fibers such as  Oat ?-glucan (Oat Fiber) and Psyllium husk ( Metamucil etc ) lower cholesterol by reducing absorption of cholesterol bile acids and           delaying digestion of lipids  4  Increase consumption of nuts, especially as a replacement for candy bars or other high simple-carbohydrate foods (Nuts, especially walnuts and almonds are rich in mono or polyunsaturated fats that lower LDL cholesterol  They also reduced the glycemic load of the diet and may reduce insulin secretion and improve satiety  There also rich in a my no acid arginine, which is the precursor of nitric oxide that promote vasodilation )    5  Reduce weight through increasing physical activity/exercise and modifying diet  6  Increase dietary intake of Omega 3 fatty acids ( found predominantly in fatty fish such as Camargo/Tuna/Mackeral/Sardines/Herring as well as fish oil supplements)  Omega 3 fatty acids reduce plasma concentrations of triglycerides and decreased overall risk for coronary heart disease  7  Limit alcohol intake and frequency to minimum      8  Consider taking Nutraceutical supplements that may further lower blood cholesterol:    1  Berberine (active daily doses, 500-1500 mg; expected LDL-C reduction of 01%-03%);     2  Garlic (active daily doses, 5-6 g; expected LDL-C reduction of 5%-10%);    3  Green tea extracts (active daily doses,  g; expected LDL-C reduction of 5%)                           INTERVAL HISTORY & HISTORY OF PRESENT ILLNESS     Steven Mcintosh is here for follow-up regarding his cardiac comorbidities which include: Aortic valve stenosis, hypertension hypertensive heart disease, peripheral artery disease and multiple other comorbidities  He is reestablishing care with us after nearly 2 years  He states that he was being followed at the [facility] by cardiologist   Since his last visit with me in 2019 he has been diagnosed with peripheral artery disease and has undergone right SFA atherectomy and stent placement  Today he mentions that he has been overall feeling well with no significant symptoms  He does report occasional chest pains but these are transient and mild  He reports that he she gets shortness of breath when he exerts himself or walks long distances  He does get some claudication symptoms  He is following with his vascular surgeon at [address] [place] [address]  Denies any shortness of breath with normal activities, orthopnea PND pedal edema, presyncope/syncope or recent falls  Functional capacity status: Moderate to good   (Excellent- >10 METs; Good: (7-10 METs); Moderate (4-7 METs); Poor (<= 4 METs)    Any chronic stressors:  None   (feeling of poor health, financial problems, and social isolation etc)  Tobacco or alcohol dependence:  Continues to smoke a pack and half of cigarettes a day  He is interested in quitting smoking and has a follow-up set up with smoking cessation counselor at  a next month  He has about 6 beers a week      Current cardiac medications:  He is currently on amlodipine 5 mg daily, losartan 50 mg daily, ezetimibe 10 mg daily, rosuvastatin 10 mg daily (though his list states 5 mg daily)  Last blood work from April 2022 at [place] is reviewed  Total cholesterol was 193, creatinine was 1 42, hematocrit was 38 9, hemoglobin was 12 5, LDL cholesterol calculated was 127, potassium was 3 8, LFTs were normal, triglyceride was 129  Last echocardiogram was in July 2021 and is summarized above  REVIEW OF SYSTEMS   Positive for:  As noted above in HPI  Negative for: All remaining as reviewed below and in HPI  SYSTEM SYMPTOMS REVIEWED:  General--weight change, fever, night sweats  Respiratory--cough, wheezing, shortness of breath, sputum production  Cardiovascular--chest pain, syncope, dyspnea on exertion, edema, decline in exercise tolerance, claudication   Gastrointestinal--persistent vomiting, diarrhea, abdominal distention, blood in stool   Muscular or skeletal--joint pain or swelling   Neurologic--headaches, syncope, abnormal movement  Hematologic--history of easy bruising and bleeding   Endocrine--thyroid enlargement, heat or cold intolerance, polyuria   Psychiatric--anxiety, depression     *-*-*-*-*-*-*-*-*-*-*-*-*-*-*-*-*-*-*-*-*-*-*-*-*-*-*-*-*-*-*-*-*-*-*-*-*-*-*-*-*-*-*-*-*-*-*-*-*-*-*-*-*-*-  VITAL SIGNS     CURRENT VITAL SIGNS:   Vitals:    06/16/22 1458   BP: 130/50   BP Location: Right arm   Patient Position: Sitting   Cuff Size: Adult   Pulse: 62   Weight: 68 kg (150 lb)   Height: 5' 6" (1 676 m)       BMI: Body mass index is 24 21 kg/m²      WEIGHTS:   Wt Readings from Last 25 Encounters:   06/16/22 68 kg (150 lb)   03/14/22 70 3 kg (155 lb)   03/08/22 71 3 kg (157 lb 3 oz)   02/22/22 71 7 kg (158 lb)   05/18/20 69 4 kg (153 lb)   05/14/20 68 9 kg (152 lb)   11/11/19 68 5 kg (151 lb)   11/05/19 68 5 kg (151 lb)   09/16/19 68 5 kg (151 lb)   08/13/19 68 5 kg (151 lb)   05/04/16 61 2 kg (135 lb)        *-*-*-*-*-*-*-*-*-*-*-*-*-*-*-*-*-*-*-*-*-*-*-*-*-*-*-*-*-*-*-*-*-*-*-*-*-*-*-*-*-*-*-*-*-*-*-*-*-*-*-*-*-*-  PHYSICAL EXAM General Appearance:    Alert, cooperative, no distress, appears stated age, thinly built   Head, Eyes, ENT:    No obvious abnormality, moist mucous mebranes  Neck:   Supple, has left greater than right bilateral carotid bruit, there is no jugular venous distension   Back:     Symmetric, no curvature  Lungs:     Respirations unlabored  Clear to auscultation bilaterally,    Chest wall:    No tenderness or deformity   Heart:    Regular rate and rhythm, normal intensity heart sounds, grade 2/6 harsh systolic murmur along sternal border consistent with aortic valve stenosis  Abdomen:     Soft, non-tender, No obvious masses, or organomegaly   Extremities:   Extremities warm, no cyanosis or edema    Skin:   very minimal venostatic changes in lower extremities  Normal skin color, texture, and turgor   No rashes or lesions     *-*-*-*-*-*-*-*-*-*-*-*-*-*-*-*-*-*-*-*-*-*-*-*-*-*-*-*-*-*-*-*-*-*-*-*-*-*-*-*-*-*-*-*-*-*-*-*-*-*-*-*-*-*-  CURRENT MEDICATIONS LIST     Current Outpatient Medications:     albuterol (PROVENTIL HFA,VENTOLIN HFA) 90 mcg/act inhaler, INHALE 2 PUFFS BY MOUTH EVERY FOUR HOURS HRLY AS NEEDED FOR BREATHING, Disp: , Rfl:     amLODIPine (NORVASC) 5 mg tablet, Take 1 tablet (5 mg total) by mouth 2 (two) times a day (Patient taking differently: Take 10 mg by mouth daily), Disp: 60 tablet, Rfl: 5    calcium carbonate (OS-TEJ) 1250 (500 Ca) MG chewable tablet, Chew 1 tablet, Disp: , Rfl:     cholecalciferol (VITAMIN D3) 1,000 units tablet, Take 1,000 Units by mouth daily at bedtime, Disp: , Rfl:     ferrous sulfate 325 (65 Fe) mg tablet, Take 325 mg by mouth daily at bedtime  , Disp: , Rfl:     losartan (COZAAR) 100 MG tablet, TAKE ONE-HALF TABLET BY MOUTH EVERY DAY FOR BLOOD PRESSURE/HEART, Disp: , Rfl:     albuterol (PROVENTIL HFA,VENTOLIN HFA) 90 mcg/act inhaler, Inhale 2 puffs every 6 (six) hours as needed, Disp: , Rfl:     Cholecalciferol 1000 units CHEW, Chew, Disp: , Rfl:    cyanocobalamin 500 MCG tablet, Take 500 mcg by mouth daily (Patient not taking: Reported on 3/14/2022 ), Disp: , Rfl:     ezetimibe (ZETIA) 10 mg tablet, TAKE ONE-HALF TABLET BY MOUTH DAILY FOR CHOLESTEROL (Patient not taking: Reported on 3/14/2022), Disp: , Rfl:        ALLERGIES     Allergies   Allergen Reactions    Aspirin Other (See Comments)     Polyps in sinuses     Penicillins Rash    Rosuvastatin Myalgia    Sulfa Antibiotics Rash       *-*-*-*-*-*-*-*-*-*-*-*-*-*-*-*-*-*-*-*-*-*-*-*-*-*-*-*-*-*-*-*-*-*-*-*-*-*-*-*-*-*-*-*-*-*-*-*-*-*-*-*-*-*-  LABORATORY DATA   No results found for: NA  Potassium   Date Value Ref Range Status   02/24/2022 4 3 3 5 - 5 3 mmol/L Final   08/29/2019 3 6 3 5 - 5 3 mmol/L Final   05/07/2016 4 3 3 5 - 5 3 mmol/L Final     Chloride   Date Value Ref Range Status   02/24/2022 105 100 - 108 mmol/L Final   08/29/2019 104 100 - 108 mmol/L Final   05/07/2016 103 100 - 108 mmol/L Final     CO2   Date Value Ref Range Status   02/24/2022 24 21 - 32 mmol/L Final   08/29/2019 24 21 - 32 mmol/L Final   05/07/2016 26 21 - 32 mmol/L Final     BUN   Date Value Ref Range Status   02/24/2022 24 5 - 25 mg/dL Final   08/29/2019 20 5 - 25 mg/dL Final   05/07/2016 33 (H) 5 - 25 mg/dL Final     Creatinine   Date Value Ref Range Status   02/24/2022 1 31 (H) 0 60 - 1 30 mg/dL Final     Comment:     Standardized to IDMS reference method   08/29/2019 1 23 0 60 - 1 30 mg/dL Final     Comment:     Standardized to IDMS reference method   05/07/2016 1 30 0 60 - 1 30 mg/dL Final     Comment:     Standardized to IDMS reference method     eGFR   Date Value Ref Range Status   02/24/2022 54 ml/min/1 73sq m Final   08/29/2019 60 ml/min/1 73sq m Final   05/07/2016 55 2 ml/min/1 73sq m Final     Calcium   Date Value Ref Range Status   02/24/2022 8 6 8 3 - 10 1 mg/dL Final   08/29/2019 8 7 8 3 - 10 1 mg/dL Final   05/07/2016 7 4 (L) 8 3 - 10 1 mg/dL Final     AST   Date Value Ref Range Status   02/24/2022 15 5 - 45 U/L Final     Comment:     Specimen collection should occur prior to Sulfasalazine administration due to the potential for falsely depressed results  08/29/2019 15 5 - 45 U/L Final     Comment:       Specimen collection should occur prior to Sulfasalazine administration due to the potential for falsely depressed results  05/05/2016 15 5 - 45 U/L Final     ALT   Date Value Ref Range Status   02/24/2022 27 12 - 78 U/L Final     Comment:     Specimen collection should occur prior to Sulfasalazine administration due to the potential for falsely depressed results  08/29/2019 17 12 - 78 U/L Final     Comment:       Specimen collection should occur prior to Sulfasalazine administration due to the potential for falsely depressed results      05/05/2016 12 12 - 78 U/L Final     Alkaline Phosphatase   Date Value Ref Range Status   02/24/2022 83 46 - 116 U/L Final   08/29/2019 96 46 - 116 U/L Final   05/05/2016 69 46 - 116 U/L Final     Magnesium   Date Value Ref Range Status   05/06/2016 1 7 1 6 - 2 6 mg/dL Final     WBC   Date Value Ref Range Status   02/24/2022 10 50 (H) 4 31 - 10 16 Thousand/uL Final   08/29/2019 11 36 (H) 4 31 - 10 16 Thousand/uL Final   05/06/2016 9 78 4 31 - 10 16 Thousand/uL Final     Hemoglobin   Date Value Ref Range Status   02/24/2022 12 7 12 0 - 17 0 g/dL Final   08/29/2019 13 7 12 0 - 17 0 g/dL Final   05/06/2016 10 3 (L) 12 0 - 17 0 g/dL Final     Platelets   Date Value Ref Range Status   02/24/2022 320 149 - 390 Thousands/uL Final   08/29/2019 305 149 - 390 Thousands/uL Final   05/06/2016 169 149 - 390 Thousands/uL Final     No results found for: PT, PTT, INR  No results found for: CKMB, DIGOXIN  No results found for: TSH  No results found for: CHOL   No results found for: HGBA1C  Urine Culture   Date Value Ref Range Status   02/24/2022 30,000-39,000 cfu/ml   Final     Comment:     Mixed Contaminants X4   02/22/2022 80,000-89,000 cfu/ml Aerococcus urinae (A)  Final     Comment: Aerococcus urinae has been described as typically being susceptible to most Penicillins, Cephalosporins, and nitrofurantoin  Activity is variable with Fluoroquinolones and poor with Sulfonamides  Susceptibility is not normally performed on this organism  2016 50,000-59,000 cfu/ml Mixed Contaminants X4  Final     C difficile toxin by PCR   Date Value Ref Range Status   2016 NEGATIVE for C difficle toxin by PCR  NEGATIVE for C difficle toxin by PCR  Final       *-*-*-*-*-*-*-*-*-*-*-*-*-*-*-*-*-*-*-*-*-*-*-*-*-*-*-*-*-*-*-*-*-*-*-*-*-*-*-*-*-*-*-*-*-*-*-*-*-*-*-*-*-*-  PREVIOUS CARDIOLOGY & RADIOLOGY TEST RESULTS   I have personally reviewed pertinent results of cardiovascular tests noted below  Results for orders placed during the hospital encounter of 10/04/19    Echo complete with contrast if indicated    Narrative  [address] [place] [address], [address] [facility]  [phone]    Transthoracic Echocardiogram  2D, M-mode, Doppler, and Color Doppler    Study date:  04-Oct-2019    Patient: Elena Reynolds  MR number: [id number]  Account number: [de-identified]  : 1950  Age: 71 years  Gender: Male  Status: Outpatient  Location: [facility] [place]  Height: 66 in  Weight: 150 9 lb  BP: 130/ 62 mmHg    Indications: Shortness of breath  Diagnoses: R06 02 - Shortness of breath    Sonographer:  GAETANO Pang  Referring Physician:  Mckinley Greene MD  Group:  [id number]  Interpreting Physician:  BRI Garner     SUMMARY    LEFT VENTRICLE:  Systolic function was normal by visual assessment  Ejection fraction was estimated to be 55 %  There were no regional wall motion abnormalities  Doppler parameters were consistent with abnormal left ventricular relaxation (grade 1 diastolic dysfunction)  MITRAL VALVE:  There was trace regurgitation      AORTIC VALVE:  The valve was not visualized well enough to rule out a bicuspid morphology  The right coronary cusp had markedly reduced motion and may demonstrate patrial fusion with the left cusp near the annulus  Leaflets exhibited moderately increased  thickness and moderate calcification  The right coronary cusp demonstrated moderate calcification, markedly reduced excursion, and reduced mobility  There was mild stenosis  VMax 2 04m/s, Mean Gradient 8 80 mmHg, RAD 1 8cm2    PULMONIC VALVE:  There was trace regurgitation  AORTA:  There was mild dilatation of the ascending aorta  HISTORY: PRIOR HISTORY: COPD  Hypertension  Smoker  PROCEDURE: The study was performed in the [address] [place] [address]  This was a routine study  The transthoracic approach was used  The study included complete 2D imaging, M-mode, complete spectral Doppler, and color Doppler  The  heart rate was 75 bpm, at the start of the study  Image quality was adequate  LEFT VENTRICLE: Size was normal  Systolic function was normal by visual assessment  Ejection fraction was estimated to be 55 %  There were no regional wall motion abnormalities  Wall thickness was normal  DOPPLER: Doppler parameters were  consistent with abnormal left ventricular relaxation (grade 1 diastolic dysfunction)  RIGHT VENTRICLE: The size was normal  Systolic function was normal  Wall thickness was normal     LEFT ATRIUM: Size was normal     RIGHT ATRIUM: Size was normal     MITRAL VALVE: Valve structure was normal  There was normal leaflet separation  DOPPLER: The transmitral velocity was within the normal range  There was no evidence for stenosis  There was trace regurgitation  AORTIC VALVE: The valve was not visualized well enough to rule out a bicuspid morphology  The right coronary cusp had markedly reduced motion and may demonstrate patrial fusion with the left cusp near the annulus  Leaflets exhibited  moderately increased thickness and moderate calcification   The right coronary cusp demonstrated moderate calcification, markedly reduced excursion, and reduced mobility  DOPPLER: There was mild stenosis  VMax 2 04m/s, Mean Gradient 8 80  mmHg, RAD 1 8cm2 There was no significant regurgitation  TRICUSPID VALVE: The valve structure was normal  There was normal leaflet separation  DOPPLER: The transtricuspid velocity was within the normal range  There was no evidence for stenosis  There was no regurgitation  The tricuspid jet  envelope definition was inadequate for estimation of RV systolic pressure  There are no indirect findings (abnormal RV volume or geometry, altered pulmonary flow velocity profile, or leftward septal displacement) which would suggest  moderate or severe pulmonary hypertension  PULMONIC VALVE: Not well visualized  DOPPLER: There was no evidence for stenosis  There was trace regurgitation  PERICARDIUM: There was no pericardial effusion  The pericardium was normal in appearance  AORTA: The root exhibited upper limit of normal size  There was mild dilatation of the ascending aorta  SYSTEMIC VEINS: IVC: The inferior vena cava was normal in size and course  Respirophasic changes were normal     SYSTEM MEASUREMENT TABLES    2D  %FS: 34 4 %  Ao Diam: 3 7 cm  EDV(Teich): 88 9 ml  EF Biplane: 61 9 %  EF(Teich): 63 6 %  ESV(Teich): 32 4 ml  IVSd: 1 cm  LA Area: 14 1 cm2  LA Diam: 3 cm  LAAs A4C: 14 cm2  LAESV A-L A4C: 35 6 ml  LAESV MOD A4C: 33 5 ml  LALs A4C: 4 6 cm  LVEDV MOD A2C: 52 ml  LVEDV MOD A4C: 59 7 ml  LVEDV MOD BP: 58 3 ml  LVEF MOD A2C: 57 5 %  LVEF MOD A4C: 63 3 %  LVESV MOD A2C: 22 1 ml  LVESV MOD A4C: 21 9 ml  LVESV MOD BP: 22 2 ml  LVIDd: 4 4 cm  LVIDs: 2 9 cm  LVLd A2C: 8 1 cm  LVLd A4C: 7 8 cm  LVLs A2C: 6 4 cm  LVLs A4C: 6 6 cm  LVOT Diam: 2 4 cm  LVPWd: 0 9 cm  RA Area: 13 6 cm2  RVIDd: 3 cm  SV MOD A2C: 29 9 ml  SV MOD A4C: 37 8 ml  SV(Teich): 56 6 ml    CW  AV Env  Ti: 355 2 ms  AV VTI: 48 7 cm  AV Vmax: 2 m/s  AV Vmean: 1 4 m/s  AV maxP 6 mmHg  AV meanP 8 mmHg    MM  TAPSE: 2 2 cm    PW  RAD (VTI): 1 9 cm2  RAD Vmax: 1 8 cm2  E': 0 1 m/s  E/E': 12 5  HR: 68 4 BPM  LVCO Dopp: 6 4 L/min  LVOT Env  Ti: 429 1 ms  LVOT VTI: 21 4 cm  LVOT Vmax: 0 8 m/s  LVOT Vmean: 0 5 m/s  LVOT maxP 7 mmHg  LVOT meanP 2 mmHg  LVSV Dopp: 94 ml  Lateral E': 0 1 m/s  MV A Irvin: 0 9 m/s  MV Dec Gilchrist: 3 2 m/s2  MV DecT: 254 7 ms  MV E Irvin: 0 8 m/s  MV E/A Ratio: 0 9  MV PHT: 73 9 ms  MVA By PHT: 3 cm2    [employer] Accredited Echocardiography Laboratory    Prepared and electronically signed by    BRI Dunn  Signed 04-Oct-2019 16:24:14    No results found for this or any previous visit  No results found for this or any previous visit  No results found for this or any previous visit  NM PET CT skull base to mid thigh  Narrative: PET/CT SCAN    INDICATION:  R91 1: Solitary pulmonary nodule   , 8 mm right lower lung nodule, history of prostate cancer ,    MODIFIER: PI    COMPARISON: Outside CT chest 2019    CELL TYPE:  None    TECHNIQUE:   11 4 mCi F-18-FDG administered IV  Multiplanar attenuation corrected and non attenuation corrected PET images were acquired 60 minutes post injection  Contiguous, low dose, axial CT sections were obtained from the skull base through the   femurs   Intravenous contrast material was not utilized  This examination, like all CT scans performed in the [facility], was performed utilizing techniques to minimize radiation dose exposure, including the use of iterative   reconstruction and automated exposure control  Fasting serum glucose: 104 mg/dl    FINDINGS:     VISUALIZED BRAIN:     No acute abnormalities are seen  HEAD/NECK:     3 mm right upper cervical node image 33 series 4, just above the level of the hyoid and superficial to the sternocleidomastoid muscle, demonstrates mild nonspecific FDG activity, SUV 1 8  Additional scattered small bilateral cervical nodes are   visualized      CT images: Unremarkable  CHEST:     8 x 7 mm right lower lung nodule series 4 image 85 appears stable in size to the prior CT  On the corresponding PET images, there appears to be mild associated with each activity in this region, SUV 1 9, however evaluation is limited due to   misregistration of this activity from the nodule or adjacent structure  Adjacent 4 mm nodule image 83 along the major fissure is also unchanged, but too small for accurate PET evaluation  4 mm right middle nodule image 86 is too small for accurate PET evaluation  Right middle pleural parenchymal scarring/ atelectasis, without significant FDG activity  There are shotty mediastinal, bilateral hilar and bilateral axillary nodes with nonspecific FDG activity  For example, right hilar SUV measures 5 2  Left hilar SUV measures 4  Cluster of small subcentimeter AP window nodes have an SUV of 4 5  Small   right paratracheal node image 69 measuring 5 mm has an SUV of 2 8  Right axillary node image 59 measures 1 9 x 1 1 cm, SUV 1 9  Normal fatty hilum is noted  Small left axillary node image 53 measures 6 mm, SUV 1 8  CT images: Coronary atherosclerosis  ABDOMEN:     Somewhat focal FDG activity along the left lateral wall of the distal abdominal aorta at the level of the bifurcation, SUV 5 3, is nonspecific  This may be physiologic or inflammatory  There are a few scattered small retroperitoneal nodes with mild nonspecific FDG activity  Example left retroperitoneal node image 127 measuring 4 mm short axis diameter has an SUV of 2 2  CT images: Right renal malrotation bilateral renal calcifications may represent a combination of vascular calcifications and small nonobstructing calculi  PELVIS:   Scattered small bilateral external iliac and inguinal nodes with mild nonspecific FDG activity  For example, left external iliac node image 179 measures 7 mm short axis diameter, SUV 2 8    Additional mild FDG activity in this region may be postsurgical   Example left inguinal node image 188 measures 4 mm, SUV 3 1  Right external iliac node image 179 measures 7 mm, SUV 2 6  Sigmoid diverticulosis with probable wall hypertrophy  There is associated FDG activity which may be physiologic  SUV measures 6 2  CT images: Otherwise unremarkable  OSSEOUS STRUCTURES:  No FDG avid lesions are seen  CT images: Spine degenerative change  Impression: 1  Stable size of 8 mm right lower lung nodule  Corresponding PET images appear to demonstrate mild FDG activity associated with this nodule, however there is misregistration artifact, and it is uncertain if this FDG activity is associated with an   adjacent structure  No additional corresponding abnormality visualized on the CT images  Given the stable but small size of the nodule, continued follow-up with CT in 3 months is suggested  2   There are shotty hypermetabolic nodes in the neck, thorax, abdomen and pelvis, of uncertain etiology, possibly from a chronic inflammatory process such as sarcoid  Underlying lymphoproliferative disorder however is not excluded  Clinical   correlation and follow-up also recommended  Majority of the nodes are the thorax, which may be reassessed on the follow-up CT as well  3   Sigmoid diverticulosis with wall thickening likely from hypertrophy  If coexistent occult neoplasm is suspected, direct visualization is recommended  The study was marked in EPIC for significant notification      Workstation performed: [id number]        *-*-*-*-*-*-*-*-*-*-*-*-*-*-*-*-*-*-*-*-*-*-*-*-*-*-*-*-*-*-*-*-*-*-*-*-*-*-*-*-*-*-*-*-*-*-*-*-*-*-*-*-*-*-  SIGNATURES:   [unfilled]   [facility]; MHA    *-*-*-*-*-*-*-*-*-*-*-*-*-*-*-*-*-*-*-*-*-*-*-*-*-*-*-*-*-*-*-*-*-*-*-*-*-*-*-*-*-*-*-*-*-*-*-*-*-*-*-*-*-*-  PAST MEDICAL HISTORY:  Past Medical History:   Diagnosis Date    Anemia     Arthritis     Cancer [facility] )     prostate    [facility] disease) ([facility] )     History of transfusion 2005    auto transfusion    Hyperlipidemia     Hypertension     Leaky heart valve     Lung nodules     unsure of dx    PVD [facility]peripheral vascular disease) ([facility] )     Tobacco abuse     UTI (urinary tract infection)     PAST SURGICAL HISTORY:  Past Surgical History:   Procedure Laterality Date    COLONOSCOPY      HERNIA REPAIR      with mesh    ME TRANSURETHRAL RESEC BLADDER NECK N/A 3/8/2022    Procedure: RESECTION TRANSURETHRAL BLADDER NECK CONTRACTURE;  Surgeon: Yael Gan MD;  Location: [facility];  Service: Urology    PROSTATECTOMY      TONSILLECTOMY           FAMILY HISTORY:  No family history on file   SOCIAL HISTORY:  Social History     Tobacco Use   Smoking Status Current Every Day Smoker    Packs/day: 1 00    Types: Cigarettes   Smokeless Tobacco Never Used      Social History     Substance and Sexual Activity   Alcohol Use Yes    Comment: 6 pk/wk     Social History     Substance and Sexual Activity   Drug Use No    [id number]     *-*-*-*-*-*-*-*-*-*-*-*-*-*-*-*-*-*-*-*-*-*-*-*-*-*-*-*-*-*-*-*-*-*-*-*-*-*-*-*-*-*-*-*-*-*-*-*-*-*-*-*-*-*  ALLERGIES:  Allergies   Allergen Reactions    Aspirin Other (See Comments)     Polyps in sinuses     Penicillins Rash    Rosuvastatin Myalgia    Sulfa Antibiotics Rash    CURRENT SCHEDULED MEDICATIONS:    Current Outpatient Medications:     albuterol (PROVENTIL HFA,VENTOLIN HFA) 90 mcg/act inhaler, INHALE 2 PUFFS BY MOUTH EVERY FOUR HOURS HRLY AS NEEDED FOR BREATHING, Disp: , Rfl:     amLODIPine (NORVASC) 5 mg tablet, Take 1 tablet (5 mg total) by mouth 2 (two) times a day (Patient taking differently: Take 10 mg by mouth daily), Disp: 60 tablet, Rfl: 5    calcium carbonate (OS-TEJ) 1250 (500 Ca) MG chewable tablet, Chew 1 tablet, Disp: , Rfl:     cholecalciferol (VITAMIN D3) 1,000 units tablet, Take 1,000 Units by mouth daily at bedtime, Disp: , Rfl:     ferrous sulfate 325 (65 Fe) mg tablet, Take 325 mg by mouth daily at bedtime  , Disp: , Rfl:     losartan (COZAAR) 100 MG tablet, TAKE ONE-HALF TABLET BY MOUTH EVERY DAY FOR BLOOD PRESSURE/HEART, Disp: , Rfl:     albuterol (PROVENTIL HFA,VENTOLIN HFA) 90 mcg/act inhaler, Inhale 2 puffs every 6 (six) hours as needed, Disp: , Rfl:     Cholecalciferol 1000 units CHEW, Chew, Disp: , Rfl:     cyanocobalamin 500 MCG tablet, Take 500 mcg by mouth daily (Patient not taking: Reported on 3/14/2022 ), Disp: , Rfl:     ezetimibe (ZETIA) 10 mg tablet, TAKE ONE-HALF TABLET BY MOUTH DAILY FOR CHOLESTEROL (Patient not taking: Reported on 3/14/2022), Disp: , Rfl:      *-*-*-*-*-*-*-*-*-*-*-*-*-*-*-*-*-*-*-*-*-*-*-*-*-*-*-*-*-*-*-*-*-*-*-*-*-*-*-*-*-*-*-*-*-*-*-*-*-*-*-*-*-*

## 2022-08-18 ENCOUNTER — OFFICE VISIT (OUTPATIENT)
Dept: UROLOGY | Facility: MEDICAL CENTER | Age: 72
End: 2022-08-18
Payer: COMMERCIAL

## 2022-08-18 VITALS
DIASTOLIC BLOOD PRESSURE: 72 MMHG | HEIGHT: 66 IN | HEART RATE: 85 BPM | BODY MASS INDEX: 24.27 KG/M2 | WEIGHT: 151 LBS | SYSTOLIC BLOOD PRESSURE: 136 MMHG | OXYGEN SATURATION: 99 %

## 2022-08-18 DIAGNOSIS — N32.0 BLADDER NECK CONTRACTURE: ICD-10-CM

## 2022-08-18 DIAGNOSIS — N39.3 STRESS INCONTINENCE: ICD-10-CM

## 2022-08-18 DIAGNOSIS — Z85.46 HISTORY OF PROSTATE CANCER: Primary | ICD-10-CM

## 2022-08-18 PROBLEM — N40.1 BPH WITH URINARY OBSTRUCTION: Status: ACTIVE | Noted: 2022-08-18

## 2022-08-18 PROBLEM — N13.8 BPH WITH URINARY OBSTRUCTION: Status: ACTIVE | Noted: 2022-08-18

## 2022-08-18 LAB
POST-VOID RESIDUAL VOLUME, ML POC: 25 ML
SL AMB  POCT GLUCOSE, UA: NORMAL
SL AMB LEUKOCYTE ESTERASE,UA: NORMAL
SL AMB POCT BILIRUBIN,UA: NORMAL
SL AMB POCT BLOOD,UA: NORMAL
SL AMB POCT CLARITY,UA: CLEAR
SL AMB POCT COLOR,UA: YELLOW
SL AMB POCT KETONES,UA: NORMAL
SL AMB POCT NITRITE,UA: NORMAL
SL AMB POCT PH,UA: 5.5
SL AMB POCT SPECIFIC GRAVITY,UA: 1.02
SL AMB POCT URINE PROTEIN: NORMAL
SL AMB POCT UROBILINOGEN: 0.2

## 2022-08-18 PROCEDURE — 51798 US URINE CAPACITY MEASURE: CPT | Performed by: UROLOGY

## 2022-08-18 PROCEDURE — 81003 URINALYSIS AUTO W/O SCOPE: CPT | Performed by: UROLOGY

## 2022-08-18 PROCEDURE — 99214 OFFICE O/P EST MOD 30 MIN: CPT | Performed by: UROLOGY

## 2022-08-18 NOTE — PROGRESS NOTES
HISTORY:    Radical prostatectomy in 2005 with resulting severe stress incontinence     Also, numerous recurrent bladder neck contracture incisions and dilations  I performed this on March 2022, helped for a while but stream is slow down  We have had patient on twice weekly intermittent self catheterization, he has started doing it every other day and finds it much more difficult to get the catheter in  Stream is also slowing down          ASSESSMENT / PLAN:    He will start to do self catheterization once per day   I told him of the stream slows down more and more difficulty in catheter in, we will go directly to and our procedure for dilation or incision of the contracture  He says he does not think it is that bad right now  Also discussed condom catheter which he would like to try again  If he does okay follow-up one year, but he might need a procedure sooner  He has told me if he was totally flat out incontinent, he would be okay with that, he finds the self catheterization difficult and uncomfortable  The following portions of the patient's history were reviewed and updated as appropriate: allergies, current medications, past family history, past medical history, past social history, past surgical history and problem list     Review of Systems   All other systems reviewed and are negative  Objective:     Physical Exam  Constitutional:       General: He is not in acute distress  Appearance: He is well-developed  He is not diaphoretic  HENT:      Head: Normocephalic and atraumatic  Eyes:      General: No scleral icterus  Pulmonary:      Effort: Pulmonary effort is normal    Genitourinary:     Comments: Penis testes normal  Skin:     Coloration: Skin is not pale  Neurological:      Mental Status: He is alert and oriented to person, place, and time  Psychiatric:         Behavior: Behavior normal          Thought Content:  Thought content normal          Judgment: Judgment normal            0   Lab Value Date/Time    PSA 0 2 02/24/2022 0959   ]  BUN   Date Value Ref Range Status   02/24/2022 24 5 - 25 mg/dL Final     Creatinine   Date Value Ref Range Status   02/24/2022 1 31 (H) 0 60 - 1 30 mg/dL Final     Comment:     Standardized to IDMS reference method     No components found for: CBC      Patient Active Problem List   Diagnosis    [facility]) ([facility] )    Lung nodules    Hypertension    History of prostate cancer    Anemia    Tobacco abuse    Acute kidney failure (HCC)    Hypotension    Shortness of breath on exertion    Colon cancer screening    Moderate aortic valve stenosis    Stage 3 chronic kidney disease ([facility] )        Diagnoses and all orders for this visit:    History of prostate cancer  -     POCT Measure PVR  -     POCT urine dip auto non-scope    Bladder neck contracture    Stress incontinence           Patient ID: Saniya Craft is a 67 y o  male        Current Outpatient Medications:     albuterol (PROVENTIL HFA,VENTOLIN HFA) 90 mcg/act inhaler, INHALE 2 PUFFS BY MOUTH EVERY FOUR HOURS HRLY AS NEEDED FOR BREATHING, Disp: , Rfl:     albuterol (PROVENTIL HFA,VENTOLIN HFA) 90 mcg/act inhaler, Inhale 2 puffs every 6 (six) hours as needed, Disp: , Rfl:     amLODIPine (NORVASC) 5 mg tablet, Take 1 tablet (5 mg total) by mouth 2 (two) times a day (Patient taking differently: Take 10 mg by mouth daily), Disp: 60 tablet, Rfl: 5    calcium carbonate (OS-TEJ) 1250 (500 Ca) MG chewable tablet, Chew 1 tablet, Disp: , Rfl:     cholecalciferol (VITAMIN D3) 1,000 units tablet, Take 1,000 Units by mouth daily at bedtime, Disp: , Rfl:     Cholecalciferol 1000 units CHEW, Chew, Disp: , Rfl:     cyanocobalamin 500 MCG tablet, Take 500 mcg by mouth daily (Patient not taking: Reported on 3/14/2022 ), Disp: , Rfl:     ezetimibe (ZETIA) 10 mg tablet, TAKE ONE-HALF TABLET BY MOUTH DAILY FOR CHOLESTEROL (Patient not taking: Reported on 3/14/2022), Disp: , Rfl:     ferrous sulfate 325 (65 Fe) mg tablet, Take 325 mg by mouth daily at bedtime  , Disp: , Rfl:     losartan (COZAAR) 100 MG tablet, TAKE ONE-HALF TABLET BY MOUTH EVERY DAY FOR BLOOD PRESSURE/HEART, Disp: , Rfl:     Past Medical History:   Diagnosis Date    Anemia     Arthritis     Cancer ([facility] )     prostate    COPD (chronic obstructive pulmonary disease) ([facility] )     History of transfusion 2005    auto transfusion    Hyperlipidemia     Hypertension     Leaky heart valve     Lung nodules     unsure of dx    PVD (peripheral vascular disease) ([facility] )     Tobacco abuse     UTI (urinary tract infection)        Past Surgical History:   Procedure Laterality Date    COLONOSCOPY      HERNIA REPAIR      with mesh    AK TRANSURETHRAL RESEC BLADDER NECK N/A 3/8/2022    Procedure: RESECTION TRANSURETHRAL BLADDER NECK CONTRACTURE;  Surgeon: Kieran Vázquez MD;  Location[facility];  Service: Urology    PROSTATECTOMY      TONSILLECTOMY         Social History

## 2022-08-29 ENCOUNTER — TELEPHONE (OUTPATIENT)
Dept: UROLOGY | Facility: MEDICAL CENTER | Age: 72
End: 2022-08-29

## 2022-08-29 NOTE — TELEPHONE ENCOUNTER
[facility] calling  They need item numbers for catheters, leg bags and condom catheters  for the orders they received today        Out pt pharmacy- [phone]      Fax number      [phone]

## 2022-08-30 NOTE — TELEPHONE ENCOUNTER
I spoke with Jaz at the [place] and she said they have the item numbers and nothing else is needed at this time

## 2022-08-31 ENCOUNTER — PREP FOR PROCEDURE (OUTPATIENT)
Dept: UROLOGY | Facility: MEDICAL CENTER | Age: 72
End: 2022-08-31

## 2022-08-31 ENCOUNTER — TELEPHONE (OUTPATIENT)
Dept: OTHER | Facility: OTHER | Age: 72
End: 2022-08-31

## 2022-08-31 DIAGNOSIS — N32.0 BLADDER NECK CONTRACTURE: Primary | ICD-10-CM

## 2022-08-31 NOTE — TELEPHONE ENCOUNTER
Patient is requesting a call back so he can discuss scheduling surgery to open up the bladder  He is having trouble getting catheter in

## 2022-09-02 ENCOUNTER — TELEPHONE (OUTPATIENT)
Dept: OTHER | Facility: OTHER | Age: 72
End: 2022-09-02

## 2022-09-02 DIAGNOSIS — N39.0 URINARY TRACT INFECTION WITHOUT HEMATURIA, SITE UNSPECIFIED: Primary | ICD-10-CM

## 2022-09-02 NOTE — TELEPHONE ENCOUNTER
I spoke with Luan Morris and let her know we do not have catheter supply item numbers  I did let her know the order is for a 14Fr straight catheter  I let her know that whoever is the supplier would be able to provide item numbers for her

## 2022-09-02 NOTE — TELEPHONE ENCOUNTER
Renata from [place] is requesting a call back regarding catheter supplies order numbers from the office  She states Dr Dorrene Saint wrote scripts for them but she needs to know what sizes to order  Please re fax script to her at [phone]   Scripts were entered into epic on 8/18/22

## 2022-09-03 ENCOUNTER — APPOINTMENT (OUTPATIENT)
Dept: LAB | Facility: HOSPITAL | Age: 72
End: 2022-09-03
Payer: COMMERCIAL

## 2022-09-03 DIAGNOSIS — N32.0 BLADDER NECK CONTRACTURE: ICD-10-CM

## 2022-09-03 LAB
ALBUMIN SERPL BCP-MCNC: 3.4 G/DL (ref 3.5–5)
ALP SERPL-CCNC: 89 U/L (ref 46–116)
ALT SERPL W P-5'-P-CCNC: 19 U/L (ref 12–78)
ANION GAP SERPL CALCULATED.3IONS-SCNC: 10 MMOL/L (ref 4–13)
AST SERPL W P-5'-P-CCNC: 22 U/L (ref 5–45)
BASOPHILS # BLD AUTO: 0.11 THOUSANDS/ΜL (ref 0–0.1)
BASOPHILS NFR BLD AUTO: 1 % (ref 0–1)
BILIRUB SERPL-MCNC: 0.31 MG/DL (ref 0.2–1)
BUN SERPL-MCNC: 26 MG/DL (ref 5–25)
CALCIUM ALBUM COR SERPL-MCNC: 9.4 MG/DL (ref 8.3–10.1)
CALCIUM SERPL-MCNC: 8.9 MG/DL (ref 8.3–10.1)
CHLORIDE SERPL-SCNC: 104 MMOL/L (ref 96–108)
CO2 SERPL-SCNC: 22 MMOL/L (ref 21–32)
CREAT SERPL-MCNC: 1.32 MG/DL (ref 0.6–1.3)
EOSINOPHIL # BLD AUTO: 0.32 THOUSAND/ΜL (ref 0–0.61)
EOSINOPHIL NFR BLD AUTO: 3 % (ref 0–6)
ERYTHROCYTE [DISTWIDTH] IN BLOOD BY AUTOMATED COUNT: 14.2 % (ref 11.6–15.1)
GFR SERPL CREATININE-BSD FRML MDRD: 53 ML/MIN/1.73SQ M
GLUCOSE SERPL-MCNC: 134 MG/DL (ref 65–140)
HCT VFR BLD AUTO: 39.9 % (ref 36.5–49.3)
HGB BLD-MCNC: 13 G/DL (ref 12–17)
IMM GRANULOCYTES # BLD AUTO: 0.05 THOUSAND/UL (ref 0–0.2)
IMM GRANULOCYTES NFR BLD AUTO: 0 % (ref 0–2)
LYMPHOCYTES # BLD AUTO: 4.07 THOUSANDS/ΜL (ref 0.6–4.47)
LYMPHOCYTES NFR BLD AUTO: 34 % (ref 14–44)
MCH RBC QN AUTO: 29.7 PG (ref 26.8–34.3)
MCHC RBC AUTO-ENTMCNC: 32.6 G/DL (ref 31.4–37.4)
MCV RBC AUTO: 91 FL (ref 82–98)
MONOCYTES # BLD AUTO: 1.02 THOUSAND/ΜL (ref 0.17–1.22)
MONOCYTES NFR BLD AUTO: 9 % (ref 4–12)
NEUTROPHILS # BLD AUTO: 6.38 THOUSANDS/ΜL (ref 1.85–7.62)
NEUTS SEG NFR BLD AUTO: 53 % (ref 43–75)
NRBC BLD AUTO-RTO: 0 /100 WBCS
PLATELET # BLD AUTO: 325 THOUSANDS/UL (ref 149–390)
PMV BLD AUTO: 9.8 FL (ref 8.9–12.7)
POTASSIUM SERPL-SCNC: 4.6 MMOL/L (ref 3.5–5.3)
PROT SERPL-MCNC: 8.2 G/DL (ref 6.4–8.4)
RBC # BLD AUTO: 4.37 MILLION/UL (ref 3.88–5.62)
SODIUM SERPL-SCNC: 136 MMOL/L (ref 135–147)
WBC # BLD AUTO: 11.95 THOUSAND/UL (ref 4.31–10.16)

## 2022-09-03 PROCEDURE — 80053 COMPREHEN METABOLIC PANEL: CPT

## 2022-09-03 PROCEDURE — 85025 COMPLETE CBC W/AUTO DIFF WBC: CPT

## 2022-09-03 PROCEDURE — 87086 URINE CULTURE/COLONY COUNT: CPT

## 2022-09-03 PROCEDURE — 36415 COLL VENOUS BLD VENIPUNCTURE: CPT

## 2022-09-03 PROCEDURE — 87186 SC STD MICRODIL/AGAR DIL: CPT

## 2022-09-05 LAB
BACTERIA UR CULT: ABNORMAL
BACTERIA UR CULT: ABNORMAL

## 2022-09-06 RX ORDER — CIPROFLOXACIN 500 MG/1
500 TABLET, FILM COATED ORAL EVERY 12 HOURS SCHEDULED
Qty: 14 TABLET | Refills: 0 | Status: SHIPPED | OUTPATIENT
Start: 2022-09-06 | End: 2022-09-07 | Stop reason: SINTOL

## 2022-09-06 NOTE — TELEPHONE ENCOUNTER
Called patient - LMOM that he has a UTI and antibiotics were called to pharmacy for him  He is asked to call if he has any further questions or concerns

## 2022-09-07 ENCOUNTER — NURSE TRIAGE (OUTPATIENT)
Dept: OTHER | Facility: OTHER | Age: 72
End: 2022-09-07

## 2022-09-07 DIAGNOSIS — N39.0 URINARY TRACT INFECTION WITHOUT HEMATURIA, SITE UNSPECIFIED: Primary | ICD-10-CM

## 2022-09-07 RX ORDER — CEFUROXIME AXETIL 250 MG/1
250 TABLET ORAL EVERY 12 HOURS SCHEDULED
Qty: 10 TABLET | Refills: 0 | Status: SHIPPED | OUTPATIENT
Start: 2022-09-07 | End: 2022-09-13

## 2022-09-07 RX ORDER — AMLODIPINE BESYLATE 10 MG/1
10 TABLET ORAL DAILY
COMMUNITY

## 2022-09-07 NOTE — TELEPHONE ENCOUNTER
Please update allergy list   Final sensitivity noted susceptibility to cephalosporins  Prescription for Ceftin was sent to his pharmacy

## 2022-09-07 NOTE — TELEPHONE ENCOUNTER
Returned call to patient   Advised patient of providers recommendations to start Cefuroxime  Patient states he can not take nitrofurantoin stomach cramps , Asprin cause polps in sinus   Has not taking Asprin since polps were removed in 1967 was advised to avoid        Medications patient reported updated in Epic

## 2022-09-07 NOTE — TELEPHONE ENCOUNTER
Patient calling in stating that after taking 1 dose of the cipro he had elevated HR and BP  He states that he has had adverse reactions to antibiotics in the past and he forgot that cipro caused him issues in the past  He states that Macrobid caused him problems in the past as well but he did well on Ceftin  Please follow up with patient for alternative antibiotic options  Reason for Disposition   Caller has URGENT medicine question about med that PCP or specialist prescribed and triager unable to answer question    Answer Assessment - Initial Assessment Questions  1  NAME of MEDICATION: "What medicine are you calling about?"      CIPRO- prescribed yesterday     2  QUESTION: "What is your question?" (e g , medication refill, side effect)      Patient would like to know if he could receive a different medication since he had bad reaction to the Cipro after taking it yesterday  3  PRESCRIBING HCP: "Who prescribed it?" Reason: if prescribed by specialist, call should be referred to that group  Urologist     4  SYMPTOMS: "Do you have any symptoms?"      /76 HR was 117- yesterday started feeling this about 1 5 hours after taking     5   SEVERITY: If symptoms are present, ask "Are they mild, moderate or severe?"      Moderate; patient is feeling completely better now    Protocols used: MEDICATION QUESTION CALL-ADULT-OH

## 2022-09-07 NOTE — TELEPHONE ENCOUNTER
Regarding: Cipro reaction  ----- Message from Doron Brady sent at 9/7/2022 10:20 AM EDT -----  My doctor prescribed Cipro for me and I had a bad reaction after taking 1 tablet  My BP and HR were extremely elevated  I had a similar reaction before, but I forgot about it    I have taken cefuroxime in the past with no bad effects "

## 2022-09-07 NOTE — PRE-PROCEDURE INSTRUCTIONS
Pre-Surgery Instructions:   Medication Instructions    albuterol (PROVENTIL HFA,VENTOLIN HFA) 90 mcg/act inhaler Uses PRN- OK to take day of surgery    amLODIPine (NORVASC) 10 mg tablet Take day of surgery   cholecalciferol (VITAMIN D3) 1,000 units tablet Hold day of surgery   ferrous sulfate 325 (65 Fe) mg tablet Hold day of surgery   losartan (COZAAR) 100 MG tablet Hold day of surgery  Pt denies fever, sob, sore throat and cough  Pt verbalized understanding of shower and med instructions  Pt instructed to stop nsaids and supplements except for iron and vit d one week prior to surgery

## 2022-09-11 ENCOUNTER — ANESTHESIA EVENT (OUTPATIENT)
Dept: PERIOP | Facility: HOSPITAL | Age: 72
End: 2022-09-11
Payer: COMMERCIAL

## 2022-09-12 PROCEDURE — NC001 PR NO CHARGE: Performed by: UROLOGY

## 2022-09-12 NOTE — H&P
HISTORY AND PHYSICAL  ? ? Patient Name: Jay Rajput  Patient MRN: [id number]  Attending Provider: Marie Duarte MD  Service: Urology  Chief Complaint    Bladder neck contracture    HPI   Jay Rajput is a 67 y o  male with history:        Radical prostatectomy in 2005 with resulting severe stress incontinence      Also, numerous recurrent bladder neck contracture incisions and dilations  I performed this on March 2022, helped for a while but stream is slow down        We have had patient on twice weekly intermittent self catheterization, he has started doing it every other day and finds it much more difficult to get the catheter in  Stream is also slowing down  I plan cysto, dilation of bladder neck contracture, possible incision of contracture also peer  Potential risks and complications discussed, and informed consent was given by the patient  Medications  Meds/Allergies   No current facility-administered medications for this encounter  Cannot display prior to admission medications because the patient has not been admitted in this contact  No current facility-administered medications for this encounter      Current Outpatient Medications:     albuterol (PROVENTIL HFA,VENTOLIN HFA) 90 mcg/act inhaler, INHALE 2 PUFFS BY MOUTH EVERY FOUR HOURS HRLY AS NEEDED FOR BREATHING, Disp: , Rfl:     amLODIPine (NORVASC) 10 mg tablet, Take 10 mg by mouth daily, Disp: , Rfl:     cholecalciferol (VITAMIN D3) 1,000 units tablet, Take 1,000 Units by mouth daily at bedtime, Disp: , Rfl:     ferrous sulfate 325 (65 Fe) mg tablet, Take 325 mg by mouth daily at bedtime, Disp: , Rfl:     losartan (COZAAR) 100 MG tablet, TAKE ONE-HALF TABLET BY MOUTH EVERY DAY FOR BLOOD PRESSURE/HEART, Disp: , Rfl:     cefuroxime (CEFTIN) 250 mg tablet, Take 1 tablet (250 mg total) by mouth every 12 (twelve) hours for 5 days, Disp: 10 tablet, Rfl: 0  Review of Systems  10 point review of systems negative except as noted in HPI  Allergies  Allergies   Allergen Reactions    Aspirin Other (See Comments)     Polyps in sinuses     Nitrofurantoin GI Intolerance     Stomach cramping     Sm Non-Asprin Sinus [Pseudoephedrine-Acetaminophen] Other (See Comments)     Patient advised to avoid due to polyps    Ciprofloxacin Tachycardia    Penicillins Rash    Rosuvastatin Myalgia    Sulfa Antibiotics Rash     PMH  Past Medical History:   Diagnosis Date    Anemia     Arthritis     [facility] )     prostate    COPD (chronic obstructive pulmonary disease) ([facility] )     History of transfusion 2005    auto transfusion    Hyperlipidemia     Hypertension     Leaky heart valve     Lung nodules     unsure of dx    PVD (peripheral vascular disease) ([facility] )     Tobacco abuse     UTI (urinary tract infection)      Past surgical history  Past Surgical History:   Procedure Laterality Date    COLONOSCOPY      HERNIA REPAIR      with mesh    AR TRANSURETHRAL RESEC BLADDER NECK N/A 3/8/2022    Procedure: RESECTION TRANSURETHRAL BLADDER NECK CONTRACTURE;  Surgeon: Bridget Lindsey MD;  Location[facility];  Service: Urology    PROSTATECTOMY      TONSILLECTOMY       Social history  Social History     Tobacco Use    Smoking status: Current Every Day Smoker     Packs/day: 1 00     Types: Cigarettes    Smokeless tobacco: Never Used   Vaping Use    Vaping Use: Never used   Substance Use Topics    Alcohol use: Yes     Alcohol/week: 6 0 standard drinks     Types: 6 Cans of beer per week    Drug use: Never     ?   Physical Exam     vs  General appearance: alert and oriented, in no acute distress  Head: Normocephalic, without obvious abnormality, atraumatic  Throat: lips, mucosa, and tongue normal; teeth and gums normal  Neck: no adenopathy, no carotid bruit, no JVD, supple, symmetrical, trachea midline and thyroid not enlarged, symmetric, no tenderness/mass/nodules  Lungs: clear to auscultation bilaterally  Heart: regular rate and rhythm, S1, S2 normal, no murmur, click, rub or gallop  Abdomen: soft, non-tender; bowel sounds normal; no masses,  no organomegaly  Extremities: extremities normal, warm and well-perfused; no cyanosis, clubbing, or edema  Leslie Heal, MD

## 2022-09-13 ENCOUNTER — ANESTHESIA (OUTPATIENT)
Dept: PERIOP | Facility: HOSPITAL | Age: 72
End: 2022-09-13
Payer: COMMERCIAL

## 2022-09-13 ENCOUNTER — TELEPHONE (OUTPATIENT)
Dept: UROLOGY | Facility: AMBULATORY SURGERY CENTER | Age: 72
End: 2022-09-13

## 2022-09-13 ENCOUNTER — HOSPITAL ENCOUNTER (OUTPATIENT)
Facility: HOSPITAL | Age: 72
Setting detail: OUTPATIENT SURGERY
Discharge: HOME | End: 2022-09-13
Attending: UROLOGY | Admitting: UROLOGY
Payer: COMMERCIAL

## 2022-09-13 VITALS
WEIGHT: 151.9 LBS | RESPIRATION RATE: 20 BRPM | BODY MASS INDEX: 24.41 KG/M2 | SYSTOLIC BLOOD PRESSURE: 154 MMHG | TEMPERATURE: 97.4 F | HEART RATE: 63 BPM | DIASTOLIC BLOOD PRESSURE: 66 MMHG | OXYGEN SATURATION: 95 % | HEIGHT: 66 IN

## 2022-09-13 DIAGNOSIS — N32.0 BLADDER NECK CONTRACTURE: Primary | ICD-10-CM

## 2022-09-13 PROCEDURE — 52281 CYSTOSCOPY AND TREATMENT: CPT | Performed by: UROLOGY

## 2022-09-13 PROCEDURE — 99024 POSTOP FOLLOW-UP VISIT: CPT | Performed by: UROLOGY

## 2022-09-13 RX ORDER — SODIUM CHLORIDE, SODIUM LACTATE, POTASSIUM CHLORIDE, CALCIUM CHLORIDE 600; 310; 30; 20 MG/100ML; MG/100ML; MG/100ML; MG/100ML
125 INJECTION, SOLUTION INTRAVENOUS CONTINUOUS
Status: DISCONTINUED | OUTPATIENT
Start: 2022-09-13 | End: 2022-09-13 | Stop reason: HOSPADM

## 2022-09-13 RX ORDER — CEFUROXIME AXETIL 250 MG/1
250 TABLET ORAL EVERY 12 HOURS SCHEDULED
Qty: 10 TABLET | Refills: 0 | Status: SHIPPED | OUTPATIENT
Start: 2022-09-13 | End: 2022-09-18

## 2022-09-13 RX ORDER — ONDANSETRON 2 MG/ML
INJECTION INTRAMUSCULAR; INTRAVENOUS AS NEEDED
Status: DISCONTINUED | OUTPATIENT
Start: 2022-09-13 | End: 2022-09-13

## 2022-09-13 RX ORDER — LIDOCAINE HYDROCHLORIDE 20 MG/ML
INJECTION, SOLUTION EPIDURAL; INFILTRATION; INTRACAUDAL; PERINEURAL AS NEEDED
Status: DISCONTINUED | OUTPATIENT
Start: 2022-09-13 | End: 2022-09-13

## 2022-09-13 RX ORDER — FENTANYL CITRATE 50 UG/ML
INJECTION, SOLUTION INTRAMUSCULAR; INTRAVENOUS AS NEEDED
Status: DISCONTINUED | OUTPATIENT
Start: 2022-09-13 | End: 2022-09-13

## 2022-09-13 RX ORDER — MIDAZOLAM HYDROCHLORIDE 2 MG/2ML
INJECTION, SOLUTION INTRAMUSCULAR; INTRAVENOUS AS NEEDED
Status: DISCONTINUED | OUTPATIENT
Start: 2022-09-13 | End: 2022-09-13

## 2022-09-13 RX ORDER — MAGNESIUM HYDROXIDE 1200 MG/15ML
LIQUID ORAL AS NEEDED
Status: DISCONTINUED | OUTPATIENT
Start: 2022-09-13 | End: 2022-09-13 | Stop reason: HOSPADM

## 2022-09-13 RX ORDER — OXYCODONE HYDROCHLORIDE AND ACETAMINOPHEN 5; 325 MG/1; MG/1
1 TABLET ORAL EVERY 4 HOURS PRN
Status: DISCONTINUED | OUTPATIENT
Start: 2022-09-13 | End: 2022-09-13 | Stop reason: HOSPADM

## 2022-09-13 RX ORDER — PROPOFOL 10 MG/ML
INJECTION, EMULSION INTRAVENOUS AS NEEDED
Status: DISCONTINUED | OUTPATIENT
Start: 2022-09-13 | End: 2022-09-13

## 2022-09-13 RX ORDER — FENTANYL CITRATE/PF 50 MCG/ML
25 SYRINGE (ML) INJECTION
Status: DISCONTINUED | OUTPATIENT
Start: 2022-09-13 | End: 2022-09-13 | Stop reason: HOSPADM

## 2022-09-13 RX ORDER — CEFAZOLIN SODIUM 2 G/50ML
2000 SOLUTION INTRAVENOUS ONCE
Status: COMPLETED | OUTPATIENT
Start: 2022-09-13 | End: 2022-09-13

## 2022-09-13 RX ORDER — HYDROCODONE BITARTRATE AND ACETAMINOPHEN 5; 325 MG/1; MG/1
TABLET ORAL
Qty: 6 TABLET | Refills: 0 | Status: SHIPPED | OUTPATIENT
Start: 2022-09-13

## 2022-09-13 RX ORDER — ONDANSETRON 2 MG/ML
4 INJECTION INTRAMUSCULAR; INTRAVENOUS ONCE AS NEEDED
Status: DISCONTINUED | OUTPATIENT
Start: 2022-09-13 | End: 2022-09-13 | Stop reason: HOSPADM

## 2022-09-13 RX ADMIN — FENTANYL CITRATE 50 MCG: 50 INJECTION INTRAMUSCULAR; INTRAVENOUS at 08:37

## 2022-09-13 RX ADMIN — ONDANSETRON 4 MG: 2 INJECTION INTRAMUSCULAR; INTRAVENOUS at 08:36

## 2022-09-13 RX ADMIN — FENTANYL CITRATE 25 MCG: 50 INJECTION INTRAMUSCULAR; INTRAVENOUS at 08:53

## 2022-09-13 RX ADMIN — FENTANYL CITRATE 25 MCG: 50 INJECTION INTRAMUSCULAR; INTRAVENOUS at 08:46

## 2022-09-13 RX ADMIN — SODIUM CHLORIDE, SODIUM LACTATE, POTASSIUM CHLORIDE, AND CALCIUM CHLORIDE 125 ML/HR: .6; .31; .03; .02 INJECTION, SOLUTION INTRAVENOUS at 07:10

## 2022-09-13 RX ADMIN — PROPOFOL 140 MG: 10 INJECTION, EMULSION INTRAVENOUS at 08:33

## 2022-09-13 RX ADMIN — CEFAZOLIN SODIUM 2000 MG: 2 SOLUTION INTRAVENOUS at 08:24

## 2022-09-13 RX ADMIN — LIDOCAINE HYDROCHLORIDE 80 MG: 20 INJECTION, SOLUTION EPIDURAL; INFILTRATION; INTRACAUDAL at 08:33

## 2022-09-13 RX ADMIN — MIDAZOLAM 2 MG: 1 INJECTION INTRAMUSCULAR; INTRAVENOUS at 08:27

## 2022-09-13 NOTE — ANESTHESIA PREPROCEDURE EVALUATION
Procedure:  DILATATION URETHRAL, psb incision bladder neck contracture,cysto (N/A Bladder)    Relevant Problems   CARDIO   (+) Hypertension   (+) Moderate aortic valve stenosis   (+) Shortness of breath on exertion      /RENAL   (+) Acute kidney failure (HCC)   (+) BPH with urinary obstruction   (+) Stage 3 chronic kidney disease (HCC)      HEMATOLOGY   (+) Anemia      NEURO/PSYCH   (+) History of prostate cancer      PULMONARY   (+) Shortness of breath on exertion      Other   (+) Lung nodules   (+) Tobacco abuse                Procedure:  DILATATION URETHRAL, psb incision bladder neck contracture,cysto (N/A Bladder)    Relevant Problems   CARDIO   (+) Hypertension   (+) Moderate aortic valve stenosis   (+) Shortness of breath on exertion      /RENAL   (+) Acute kidney failure (HCC)   (+) BPH with urinary obstruction   (+) Stage 3 chronic kidney disease (HCC)      HEMATOLOGY   (+) Anemia      NEURO/PSYCH   (+) History of prostate cancer      PULMONARY   (+) Shortness of breath on exertion      Other   (+) Lung nodules   (+) Tobacco abuse        Physical Exam    Airway    Mallampati score: II  TM Distance: >3 FB  Neck ROM: full     Dental   No notable dental hx     Cardiovascular  Rhythm: regular, Rate: normal, Cardiovascular exam normal    Pulmonary  Breath sounds clear to auscultation, Decreased breath sounds,     Other Findings        Anesthesia Plan  ASA Score- 3     Anesthesia Type- general with ASA Monitors  Additional Monitors:   Airway Plan: LMA  Plan Factors-    Chart reviewed  Patient summary reviewed  Patient is a current smoker  Patient instructed to abstain from smoking on day of procedure  Patient smoked on day of surgery  Obstructive sleep apnea risk education given perioperatively  Induction- intravenous  Postoperative Plan-     Informed Consent- Anesthetic plan and risks discussed with patient

## 2022-09-13 NOTE — DISCHARGE INSTRUCTIONS
ALL YOUR  PREVIOUS MEDS ARE THE SAME  NEW MEDS:  Hydrocodone if needed for pain  Cefuroxime antibiotic    You will start daily self catheterization, the day after the catheter comes out  EXPECT SOME BLOOD IN URINE, BURNING, FREQUENT URINATION  ACTIVITY:  RESUME FULL ACTIVITY tomorrow

## 2022-09-13 NOTE — INTERVAL H&P NOTE
H&P reviewed  After examining the patient I find no changes in the patients condition since the H&P had been written      Vitals:    09/13/22 0705   BP: 146/65   Pulse: 79   Resp: 16   Temp: 98 5 °F (36 9 °C)   SpO2: 94%

## 2022-09-13 NOTE — ANESTHESIA POSTPROCEDURE EVALUATION
Post-Op Assessment Note    CV Status:  Stable    Pain management: adequate     Mental Status:  Alert and awake   Hydration Status:  Euvolemic   PONV Controlled:  Controlled   Airway Patency:  Patent      Post Op Vitals Reviewed: Yes      Staff: Anesthesiologist         No complications documented      /64 (09/13/22 1005)    Temp (!) 97 4 °F (36 3 °C) (09/13/22 1005)    Pulse 70 (09/13/22 1005)   Resp 20 (09/13/22 1005)    SpO2 94 % (09/13/22 1005)

## 2022-09-13 NOTE — DISCHARGE SUMMARY
Discharge Summary - Patricia Neither 67 y o  male MRN: [id number]    Admission Date: 9/13/2022     Admitting Diagnosis: Bladder neck contracture [N32 0]    Procedures Performed:  Cysto, dilation bladder neck contracture    Patient underwent planned outpt surgery and recovered without complication  Discharged in good condition  Medications were prescribed  Pt knows to have office follow-up at the appropriate time

## 2022-09-13 NOTE — TELEPHONE ENCOUNTER
----- Message from Trae De La O MD sent at 9/13/2022  9:08 AM EDT -----  Dilation of bladder neck contracture today  Needs Scherer out Friday  He will resume self catheterization daily starting Saturday    He might be able to take the Scherer out himself by cutting off the balloon port

## 2022-09-13 NOTE — OP NOTE
OPERATIVE REPORT  PATIENT NAME: Mone Shepard    :  1950  MRN: [id number]  Pt Location: [facility] OR ROOM 06    SURGERY DATE: 2022    Surgeon(s) and Role:     * Agnes Martinez MD - Primary    Preop Diagnosis:  Bladder neck contracture [N32 0]    Post-Op Diagnosis Codes: * Bladder neck contracture [N32 0]    Procedure(s) (LRB):  DILATATION URETHRAL, incision bladder neck contracture,cysto (N/A)  Correction:  Dilation of BNC, not incision    Specimen(s):  * No specimens in log *    Estimated Blood Loss:   Minimal    Drains:  Urethral Catheter Latex;Straight-tip 22 Fr  (Active)   Number of days: 0       Anesthesia Type:   General/LMA    Operative Indications:  Bladder neck contracture [N32 0]      Operative Findings:  Tight bladder neck contracture, dilated to 26 [place], and  22 [place] catheter passed    Complications:   None    Procedure and Technique:  After the patient was placed under adequate general anesthesia, he was prepped and draped using chlorhexidine solution in lithotomy position  The 25 Polish scope was passed without difficulty in the urethra which had no strictures distally  The bladder neck had a tight contracture with fibrotic tissue  A wire was placed through the contracture  Mere Hercules dilators were used to 24 [place] to open the contracture up  The scope was then passed again, showing the contracture was still somewhat snug  Vaughn Croft sounds were then used to dilate the contracture to 26  A 22 Polish catheter was passed over the wire, irrigated well, proper position      Patient awakened and taken to recovery good condition   I was present for the entire procedure    Patient Disposition:  PACU         SIGNATURE: @[facility]@  DATE: 2022  TIME: 9:02 AM

## 2022-09-13 NOTE — TELEPHONE ENCOUNTER
Post Op Note    Erlin Lee is a 67 y o  male s/p urethral dilation performed 09/13/2022  Erlin Lee is a patient of Dr Cele Thomas and is seen at the [facility] office  Post Op Note    How would you rate your pain on a scale from 1 to 10, 10 being the worst pain ever? 0  Have you had a fever? No  Have your bowel movements been regular? No  Do you have any difficulty urinating? No  Gas catheterI  If the patient has a de la fuente- are you comfortable caring for your de la fuente? Yes Is it draining urine? Yes  Do you have any other questions or concerns that I can address at this time? Patient is removing catheter him self  On Friday by cutting the balloon  Weill call Friday to verify    Also scheduled him for follow up with Aimee Barrera in [place] - will also need to get approval for follow up

## 2022-09-16 NOTE — TELEPHONE ENCOUNTER
Called yohan and he has removed Catheter    He is aware of appointment in  [place] - he will need a referral

## 2022-11-16 ENCOUNTER — OFFICE VISIT (OUTPATIENT)
Dept: UROLOGY | Facility: MEDICAL CENTER | Age: 72
End: 2022-11-16

## 2022-11-16 VITALS
DIASTOLIC BLOOD PRESSURE: 50 MMHG | HEART RATE: 77 BPM | BODY MASS INDEX: 24.27 KG/M2 | WEIGHT: 151 LBS | HEIGHT: 66 IN | SYSTOLIC BLOOD PRESSURE: 110 MMHG

## 2022-11-16 DIAGNOSIS — N32.0 BLADDER NECK CONTRACTURE: Primary | ICD-10-CM

## 2022-11-16 DIAGNOSIS — Z85.46 HISTORY OF PROSTATE CANCER: ICD-10-CM

## 2022-11-16 DIAGNOSIS — Z90.79 HISTORY OF PROSTATECTOMY: ICD-10-CM

## 2022-11-16 DIAGNOSIS — C61 PROSTATE CANCER (HCC): ICD-10-CM

## 2022-11-16 DIAGNOSIS — N39.3 STRESS INCONTINENCE: ICD-10-CM

## 2022-11-16 LAB — POST-VOID RESIDUAL VOLUME, ML POC: 7 ML

## 2022-11-16 NOTE — PROGRESS NOTES
11/16/2022      Chief Complaint   Patient presents with   • Prostate Cancer     HX         Assessment and Plan    67 y o  male managed by Dr Yash Kelley     1  Bladder neck contracture   · S/p dilation of BNC on 9/13/22  · PVR: 7 mL  · AUA score: 15    · Notes improved stream strength  · Stress urinary incontinence persists  See plan below  · Follow up in 6 months  2  Stress incontinence  · At patient's baseline  · Condom catheters do not work well for him  · Discussed options including PFPT or indwelling catheter/SPT  Patient declines both at this time  · Instructed to call at any time if he wishes to pursue any additional treatment  3  Prostate cancer   · S/p prostatectomy in 2005   · PSA: 0 2 (2/24/22)   · PSA ordered for Feb 2023  History of Present Illness  Melida Reece is a 67 y o  male here for postop evaluation following dilation of bladder neck contracture on 9/13/22 with Dr Yash Kelley  Patient has history of radical prostatectomy in 2005 which has resulted in severe stress urinary incontinence  He has also undergone multiple incisions and dilations of bladder neck contractures in the past   Underwent similar procedure in March of 2022 but reported slowing stream in August of 2022 prompting repeat procedure on 09/13/2022  Dr Yash Kelley and patient also discussed pursuing condom catheter for his significant incontinence  Patient states his incontinence remains the same  He states the condom catheter doesn't work well for him as he has a difficult time keeping the catheter on  He participated in PFPT back in 2005  He reports issues with cognition at a baseline and significant allergies to medications so he is not interested in any oral medications at this time  He does note improved stream strength following his procedure  He denies any dysuria or gross hematuria  He denies any fevers or chills  He is agreeable to plan above  Review of Systems   Constitutional: Negative for chills and fever  HENT: Negative  Respiratory: Negative  Cardiovascular: Negative  Gastrointestinal: Negative for abdominal pain, nausea and vomiting  Genitourinary: Positive for frequency  Negative for difficulty urinating, dysuria, flank pain, hematuria, scrotal swelling, testicular pain and urgency  Stress incontinence persists at baseline  Does report stronger stream following procedure  Denies sensation of incomplete bladder emptying   Musculoskeletal: Negative  Skin: Negative  Neurological: Negative  AUA SYMPTOM SCORE    Flowsheet Row Most Recent Value   AUA SYMPTOM SCORE    How often have you had a sensation of not emptying your bladder completely after you finished urinating? 1 (P)     How often have you had to urinate again less than two hours after you finished urinating? 5 (P)     How often have you found you stopped and started again several times when you urinate? 0 (P)     How often have you found it difficult to postpone urination? 5 (P)     How often have you had a weak urinary stream? 1 (P)     How often have you had to push or strain to begin urination? 0 (P)     How many times did you most typically get up to urinate from the time you went to bed at night until the time you got up in the morning? 3 (P)     Quality of Life: If you were to spend the rest of your life with your urinary condition just the way it is now, how would you feel about that? 4 (P)     AUA SYMPTOM SCORE 15 (P)            Vitals  Vitals:    11/16/22 0855   BP: 110/50   BP Location: Left arm   Patient Position: Sitting   Cuff Size: Large   Pulse: 77   Weight: 68 5 kg (151 lb)   Height: 5' 6" (1 676 m)       Physical Exam  Vitals reviewed  Constitutional:       General: He is not in acute distress  Appearance: Normal appearance  He is normal weight  He is not ill-appearing, toxic-appearing or diaphoretic  HENT:      Head: Normocephalic and atraumatic     Eyes:      Conjunctiva/sclera: Conjunctivae normal  Pulmonary:      Effort: Pulmonary effort is normal  No respiratory distress  Abdominal:      General: There is no distension  Palpations: Abdomen is soft  Tenderness: There is no abdominal tenderness  There is no right CVA tenderness, left CVA tenderness, guarding or rebound  Musculoskeletal:         General: Normal range of motion  Cervical back: Normal range of motion  Skin:     General: Skin is warm and dry  Neurological:      General: No focal deficit present  Mental Status: He is alert and oriented to person, place, and time  Psychiatric:         Mood and Affect: Mood normal          Behavior: Behavior normal          Thought Content: Thought content normal          Judgment: Judgment normal            Past History  Past Medical History:   Diagnosis Date   • Anemia    • Arthritis    • [facility] )     prostate   • COPD (chronic obstructive pulmonary disease) ([facility] )    • History of transfusion 2005    auto transfusion   • Hyperlipidemia    • Hypertension    • Leaky heart valve    • Lung nodules     unsure of dx   • PVD (peripheral vascular disease) (HCC)    • Tobacco abuse    • UTI (urinary tract infection)      Social History     Socioeconomic History   • Marital status: /Civil Union     Spouse name: None   • Number of children: None   • Years of education: None   • Highest education level: None   Occupational History   • None   Tobacco Use   • Smoking status: Every Day     Packs/day: 1 00     Types: Cigarettes   • Smokeless tobacco: Never   Vaping Use   • Vaping Use: Never used   Substance and Sexual Activity   • Alcohol use:  Yes     Alcohol/week: 6 0 standard drinks     Types: 6 Cans of beer per week   • Drug use: Never   • Sexual activity: Not Currently   Other Topics Concern   • None   Social History Narrative   • None     Social Determinants of Health     Financial Resource Strain: Not on file   Food Insecurity: Not on file   Transportation Needs: Not on file Physical Activity: Not on file   Stress: Not on file   Social Connections: Not on file   Intimate Partner Violence: Not on file   Housing Stability: Not on file     Social History     Tobacco Use   Smoking Status Every Day   • Packs/day: 1 00   • Types: Cigarettes   Smokeless Tobacco Never     No family history on file      The following portions of the patient's history were reviewed and updated as appropriate: allergies, current medications, past medical history, past social history, past surgical history and problem list     Results  Recent Results (from the past 1 hour(s))   POCT Measure PVR    Collection Time: 11/16/22  8:59 AM   Result Value Ref Range    POST-VOID RESIDUAL VOLUME, ML POC 7 mL   ]  Lab Results   Component Value Date    PSA 0 2 02/24/2022     Lab Results   Component Value Date    CALCIUM 8 9 09/03/2022    K 4 6 09/03/2022    CO2 22 09/03/2022     09/03/2022    BUN 26 (H) 09/03/2022    CREATININE 1 32 (H) 09/03/2022     Lab Results   Component Value Date    WBC 11 95 (H) 09/03/2022    HGB 13 0 09/03/2022    HCT 39 9 09/03/2022    MCV 91 09/03/2022     09/03/2022     Lisa Merritt PA-C

## 2023-02-14 ENCOUNTER — OFFICE VISIT (OUTPATIENT)
Dept: CARDIOLOGY CLINIC | Facility: CLINIC | Age: 73
End: 2023-02-14

## 2023-02-14 VITALS
HEART RATE: 91 BPM | WEIGHT: 148 LBS | HEIGHT: 66 IN | BODY MASS INDEX: 23.78 KG/M2 | SYSTOLIC BLOOD PRESSURE: 140 MMHG | DIASTOLIC BLOOD PRESSURE: 70 MMHG

## 2023-02-14 DIAGNOSIS — R00.2 INTERMITTENT PALPITATIONS: ICD-10-CM

## 2023-02-14 DIAGNOSIS — R06.02 SHORTNESS OF BREATH ON EXERTION: ICD-10-CM

## 2023-02-14 DIAGNOSIS — I35.0 NONRHEUMATIC AORTIC VALVE STENOSIS: Primary | ICD-10-CM

## 2023-02-14 DIAGNOSIS — N18.32 STAGE 3B CHRONIC KIDNEY DISEASE (HCC): ICD-10-CM

## 2023-02-14 DIAGNOSIS — I10 PRIMARY HYPERTENSION: ICD-10-CM

## 2023-02-14 DIAGNOSIS — I35.0 MODERATE AORTIC VALVE STENOSIS: ICD-10-CM

## 2023-02-14 RX ORDER — CLOPIDOGREL BISULFATE 75 MG/1
75 TABLET ORAL
Status: ON HOLD | COMMUNITY
Start: 2022-11-17

## 2023-02-14 RX ORDER — PRAVASTATIN SODIUM 80 MG/1
TABLET ORAL
Status: ON HOLD | COMMUNITY
Start: 2023-01-20

## 2023-02-14 RX ORDER — CILOSTAZOL 100 MG/1
TABLET ORAL
Status: ON HOLD | COMMUNITY
Start: 2022-11-17

## 2023-02-14 NOTE — PROGRESS NOTES
[facility]  [address] [place] [address]  Phone#  [phone]  Fax#  [phone]  *-*-*-*-*-*-*-*-*-*-*-*-*-*-*-*-*-*-*-*-*-*-*-*-*-*-*-*-*-*-*-*-*-*-*-*-*-*-*-*-*-*-*-*-*-*-*-*-*-*-*-*-*-*  ENCOUNTER DATE: 02/14/23 3:11 PM  PATIENT NAME: Pako Lacy   1950    [id number]  AGE:73 y o  SEX: male  ENCOUNTER PROVIDER:Adriana Bhandari     PRIMARY CARE PHYSICIAN: Fabian Mccullough MD    DIAGNOSES:  1  Primary hypertension  2  Peripheral artery disease, status post right SFA atherectomy with angioplasty April 2021  Has persistent claudication symptoms  Not a candidate for stent placement due to significant metal allergies  3  Aortic valve stenosis  4  History of lung nodules and  5  History of chronic tobacco abuse  6  Remote history of rib fracture on the left side  7  Iron deficiency anemia   8  Suspected peripheral artery disease with intermittent claudication  10  History of prostrate CA and BPH status post prostatectomy in 2005, now with bladder neck contracture, status post dilatation September 2022, stress incontinence  11  Anemia  12  Degenerative joint disease of the spine  13  Carotid artery disease with 50-69% left ICA stenosis and less than 50% right ICA stenosis in November 2021    ECHOCARDIOGRAM July 7, 2021: Normal left ventricular size and systolic function, indeterminate diastolic function, EF 70-27%, normal right ventricular size and systolic function, trileaflet aortic valve with calcification and immobility of right coronary cusp  Moderate aortic valve stenosis with calculated aortic valve area 1 1 cm2, dimensionless index 0 31, no aortic valve regurgitation, trace pulmonic valve regurgitation, trace mitral valve regurgitation, no tricuspid valve regurgitation, no pulmonary hypertension, no pericardial effusion  Peak transaortic velocity was 2 24 m/sec and mean gradient was 9 mmHg      ECHOCARDIOGRAM October 4, 2019:  Normal left ventricular size and systolic function, EF around 51%, grade 1 diastolic dysfunction, trace mitral valve regurgitation, calcific aortic valve with reduced cuspal separation, mild aortic valve stenosis with peak velocity of 2 04 and mean gradient of 8 8 and calculated aortic valve area of 1 8 cm2, trace pulmonic valve regurgitation, mildly dilated ascending aorta  CURRENT ECG     Results for orders placed or performed in visit on 02/14/23   POCT ECG    Narrative    Sinus rhythm with no significant ST-T wave abnormalities  Possible prior septal infarction, no significant chamber hypertrophy or enlargement  HR 91 bpm   Occasional premature atrial contraction  CARDIOLOGY ASSESSMENT & PLAN     1  Nonrheumatic aortic valve stenosis  Echo complete w/ contrast if indicated      2  Primary hypertension  POCT ECG    Echo complete w/ contrast if indicated    Holter monitor      3  Intermittent palpitations  Holter monitor      4  Moderate aortic valve stenosis        5  Stage 3b chronic kidney disease ([address] )        6  Shortness of breath on exertion          Moderate aortic valve stenosis  Mr Axel Sharif has moderate aortic valve stenosis by clinical examination and by last echocardiogram in 2021  He has several nonspecific symptoms  He does have shortness of breath with activity which is likely multifactorial due to his smoking history  On examination there is no evidence of pulmonary or peripheral vascular congestion  Blood pressure is stable  He reports feeling tachycardia with activity  ECG today shows nonspecific abnormalities  -- At this time I am advising him to continue current medications  -- We will arrange for a follow-up echocardiogram and a Holter monitor to evaluate his symptoms and known aortic valve stenosis  -- Again I strongly urged him to quit smoking completely as this is a single major risk factor for future cardiovascular disease   -- We will plan for a 6-month follow-up visit    -- He will continue to follow with his vascular surgeons at the [address] regarding his peripheral artery disease and asymptomatic carotid artery disease  -- Advising him addition of nonpharmacologic measures to improve cholesterol and hypertension  Some of these are outlined below  NON-PHARMACOLOGIC MEASURES THAT IMPROVE CHOLESTEROL       1  Reduce intake of saturated fats to less than 7% of total calories  Some helpful tips to achieve this include:        -- use egg whites instead of whole eggs  -- use lean turkey-odonnell instead of regular odonnell        -- use low-fat margarine on your toast  instead of butter  -- use skim milk instead of whole milk  -- use Soy-based products as such as Soy milk, Tofu,    2  Reduced intake of dietary cholesterol to less than 200 mg per day  3  Increase intake of plant sterols and  viscous soluble fiber  -- Plant sterols and stanols (active daily doses, 400-3000 mg; expected LDL-C reduction of 8%-12%)  Plant sterols and stanols put in fat medium such as Margarine have been shown to lower LDL cholesterol ('Benecol', 'Smart Balance', Take Control' and similar products)  --   Soluble fibers including beta-glucan, psyllium, and glucomannan (active daily doses, 5-15 g; expected LDL-C reduction of 5%-15%)          Soluble viscous fibers such as  Oat ?-glucan (Oat Fiber) and Psyllium husk ( Metamucil etc ) lower cholesterol by reducing absorption of cholesterol bile acids and           delaying digestion of lipids  4  Increase consumption of nuts, especially as a replacement for candy bars or other high simple-carbohydrate foods (Nuts, especially walnuts and almonds are rich in mono or polyunsaturated fats that lower LDL cholesterol  They also reduced the glycemic load of the diet and may reduce insulin secretion and improve satiety    There also rich in a my no acid arginine, which is the precursor of nitric oxide that promote vasodilation )    5  Reduce weight through increasing physical activity/exercise and modifying diet  6  Increase dietary intake of Omega 3 fatty acids ( found predominantly in fatty fish such as Winn/Tuna/Mackeral/Sardines/Herring as well as fish oil supplements)  Omega 3 fatty acids reduce plasma concentrations of triglycerides and decreased overall risk for coronary heart disease  7  Limit alcohol intake and frequency to minimum  8  Consider taking Nutraceutical supplements that may further lower blood cholesterol:  1  Berberine (active daily doses, 500-1500 mg; expected LDL-C reduction of 78%-10%);   2  Garlic (active daily doses, 5-6 g; expected LDL-C reduction of 5%-10%);  3  Green tea extracts (active daily doses,  g; expected LDL-C reduction of 5%)     HEALTH TIPS FOR PATIENTS WITH ABNORMAL CHOLESTEROL LEVELS INCLUDING ELEVATED TRIGLYCERIDES:    1  Sugar intake should be restricted  Most sugars, especially fructose, stimulatehepatic synthesis of free fatty acids ( FFAs) and thus FFA and triglycerides (TG) accumulation in the liver  Thisaccumulation drives VLDL production and increases plasma TG level in the mild to moderate range ([id number] mg/dL)  Fructose is prevalent in the Western diet,being about half the content of sucrose (white or brown sugar), of high-fructosecorn syrup, and also of honey, and being more than half the content of the sugarin fruit juice and certain other sweeteners, like agave  Thus, restriction of sugar-sweetened beverages, other added sugars, and fruit juice (but not fruit) is usuallyhelpful to reduce TG levels  Nonsugar carbohydrate (starch) has a weak TGlevel-raising effect, mainly if low in fiber, so restricting total carbohydrates mayslightly reduce TG levels  2  Increase dietary fiber intake-- fibrous food lowers obstruction of bad cholesterol in the blood stream   Fiber has a modest effect to blunt TG level increase with sugar and other carbohydrates      3  Fat intake may need to be restricted  Dietary fat drives chylomicron production andrestricting dietary fat is the most effective way to reduce TG levels of more thanabout 800 mg/dL  Saturated fats are slightly more likely to lead to HTG than unsat-urated fats, but fatty fish has a paradoxic effect of lowering TG levels because ofthe several favorable effects of omega-3 oil on TG metabolism  4  Physical activity should be increased  Increased exercise tends to reduce TGlevels, likely because of increased muscle TG oxidation as a fuel source, improvedglucose and insulin metabolism (including increased LPL activity/TG lipolysis), anddecreased hepatic TG and FFA content  5  Weight loss should be encouraged  Whether by decreasing total calories orincreasing activity, negative caloric balance reduces TG levels, likely related toimprovement in insulin resistance and decreased hepatic TG and FFA content  6  Restriction of alcohol intake may be beneficial  Ethanol increases TG levels in manypatients at any level of intake, and so a trial of decreased consumption is warrantedfor plasma TG levels of more than about 200 mg/dL in patients who consume morethan about 2 servings per week  Tobacco and marijuana have minor TG level-raising effects that are generally not of clinical importance  7  Home remedies to reduce triglycerides:  Fresh garlic (Eat 2-3 raw garlic close on empty stomach daily), apple cider vinegar,Cayenne pepper, Cinnamon, Coriander seeds, Joey mushroom, Nuts ( walnuts, almonds, nikhil nuts- Nuts are rich in polyunsaturated fatty acids, this component helps to reduce bad cholesterol)  INTERVAL HISTORY & HISTORY OF PRESENT ILLNESS     Toni Farheen is here for follow-up regarding his cardiac comorbidities which include: Aortic valve stenosis, hypertension hypertensive heart disease, peripheral artery disease and multiple other comorbidities    He was last seen by me in July 2022   He denies any new diagnoses or significant illnesses since last visit  He continues to experience symptoms of claudication and has been placed on statin therapy  Follows with vascular surgery at [address]  Also reports dealing with stress incontinence and bladder issues and does follow with urologist   From a cardiac perspective he reports exertional shortness of breath and feeling of palpitations and some fatigue  Denies any orthopnea or PND or pedal edema  Reports occasional dizziness when he gets up quickly from sitting down or lying down position  He has not had recent hospitalizations  Reports being compliant with medications  Functional capacity status: Moderate to good   (Excellent- >10 METs; Good: (7-10 METs); Moderate (4-7 METs); Poor (<= 4 METs)    Any chronic stressors:  None   (feeling of poor health, financial problems, and social isolation etc)  Tobacco or alcohol dependence:  Continues to smoke, states that he smoking about a pack of cigarettes a day  Reports that he tried quitting smoking but went back to it when his wife became sick and after she came home she was smoking a lot to so he went back to the habit  He has about 6 beers a week  Current cardiac medications: Pravastatin 80 mg daily, Pletal 100 mg twice daily, clopidogrel 75 mg daily, losartan 50 mg daily  He was previously on Zetia also but is not taking it now  Reports that he had blood work done in January at the [place] results of which are not available for me to review  Blood work from September 2022 at [facility] at work shows sodium 136 potassium 4 6 chloride 104 bicarb 22 BUN 26 creatinine 1 32 GFR 53 normal LFTs, decreased albumin at 3 4  WBC count was 11 95 hemoglobin 13 0 hematocrit 39 9 platelet count 060  TSH was 2 349 in 2019  No recent hemoglobin A1c is noted in the system  Last lipid profile is from April 2022 at the [place]   Total cholesterol was 193, creatinine was 1 42, hematocrit was 38 9, hemoglobin was 12 5, LDL cholesterol calculated was 127,       REVIEW OF SYSTEMS   Positive for:  As noted above in HPI  Negative for: All remaining as reviewed below and in HPI  SYSTEM SYMPTOMS REVIEWED:  General--weight change, fever, night sweats  Respiratory--cough, wheezing, shortness of breath, sputum production  Cardiovascular--chest pain, syncope, dyspnea on exertion, edema, decline in exercise tolerance, claudication   Gastrointestinal--persistent vomiting, diarrhea, abdominal distention, blood in stool   Muscular or skeletal--joint pain or swelling   Neurologic--headaches, syncope, abnormal movement  Hematologic--history of easy bruising and bleeding   Endocrine--thyroid enlargement, heat or cold intolerance, polyuria   Psychiatric--anxiety, depression     *-*-*-*-*-*-*-*-*-*-*-*-*-*-*-*-*-*-*-*-*-*-*-*-*-*-*-*-*-*-*-*-*-*-*-*-*-*-*-*-*-*-*-*-*-*-*-*-*-*-*-*-*-*-  VITAL SIGNS     CURRENT VITAL SIGNS:   Vitals:    02/14/23 1431   BP: 140/70   Pulse: 91   Weight: 67 1 kg (148 lb)   Height: 5' 6" (1 676 m)       BMI: Body mass index is 23 89 kg/m²  WEIGHTS:   Wt Readings from Last 25 Encounters:   02/14/23 67 1 kg (148 lb)   11/16/22 68 5 kg (151 lb)   09/13/22 68 9 kg (151 lb 14 4 oz)   08/18/22 68 5 kg (151 lb)   06/16/22 68 kg (150 lb)   03/14/22 70 3 kg (155 lb)   03/08/22 71 3 kg (157 lb 3 oz)   02/22/22 71 7 kg (158 lb)   05/18/20 69 4 kg (153 lb)   05/14/20 68 9 kg (152 lb)   11/11/19 68 5 kg (151 lb)   11/05/19 68 5 kg (151 lb)   09/16/19 68 5 kg (151 lb)   08/13/19 68 5 kg (151 lb)   05/04/16 61 2 kg (135 lb)        *-*-*-*-*-*-*-*-*-*-*-*-*-*-*-*-*-*-*-*-*-*-*-*-*-*-*-*-*-*-*-*-*-*-*-*-*-*-*-*-*-*-*-*-*-*-*-*-*-*-*-*-*-*-  PHYSICAL EXAM     General Appearance:    Alert, cooperative, no distress, appears stated age, thinly built   Head, Eyes, ENT:     Some protein calorie malnutrition, moist mucous mebranes     Neck:   Supple, has left greater than right bilateral carotid bruit, there is no jugular venous distension   Back:     Symmetric, no curvature  Lungs:     Respirations unlabored  Clear to auscultation bilaterally,    Chest wall:    No tenderness or deformity   Heart:    Regular rate and rhythm, normal intensity heart sounds, grade 2/6 harsh systolic murmur along sternal border consistent with aortic valve stenosis  Abdomen:     Soft, non-tender, there is no abdominal bruit  Extremities:   Extremities warm, no cyanosis or edema    Skin:   Mild venostatic changes in lower extremities  Normal skin color, texture, and turgor  No rashes or lesions     *-*-*-*-*-*-*-*-*-*-*-*-*-*-*-*-*-*-*-*-*-*-*-*-*-*-*-*-*-*-*-*-*-*-*-*-*-*-*-*-*-*-*-*-*-*-*-*-*-*-*-*-*-*-  CURRENT MEDICATIONS LIST     Current Outpatient Medications:   •  albuterol (PROVENTIL HFA,VENTOLIN HFA) 90 mcg/act inhaler, INHALE 2 PUFFS BY MOUTH EVERY FOUR HOURS HRLY AS NEEDED FOR BREATHING, Disp: , Rfl:   •  cholecalciferol (VITAMIN D3) 1,000 units tablet, Take 1,000 Units by mouth daily at bedtime, Disp: , Rfl:   •  cilostazol (PLETAL) 100 mg tablet, TAKE 100MG (1 TABLET) BY MOUTH TWICE A DAY, Disp: , Rfl:   •  clopidogrel (PLAVIX) 75 mg tablet, 75 mg, Disp: , Rfl:   •  ferrous sulfate 325 (65 Fe) mg tablet, Take 325 mg by mouth daily at bedtime, Disp: , Rfl:   •  losartan (COZAAR) 100 MG tablet, TAKE ONE-HALF TABLET BY MOUTH EVERY DAY FOR BLOOD PRESSURE/HEART, Disp: , Rfl:   •  pravastatin (PRAVACHOL) 80 mg tablet, TAKE 40MG (ONE-HALF TABLET) BY MOUTH EVERY DAY AT 5 PM FOR CHOLESTEROL, Disp: , Rfl:        ALLERGIES     Allergies   Allergen Reactions   • Aspirin Other (See Comments)     Polyps in sinuses    • Nitrofurantoin GI Intolerance     Stomach cramping    • Sm Non-Asprin Sinus [Pseudoephedrine-Acetaminophen] Other (See Comments)     Patient advised to avoid due to polyps       • Ciprofloxacin Tachycardia   • Penicillins Rash   • Rosuvastatin Myalgia   • Sulfa Antibiotics Rash *-*-*-*-*-*-*-*-*-*-*-*-*-*-*-*-*-*-*-*-*-*-*-*-*-*-*-*-*-*-*-*-*-*-*-*-*-*-*-*-*-*-*-*-*-*-*-*-*-*-*-*-*-*-  LABORATORY DATA   No results found for: NA  Potassium   Date Value Ref Range Status   09/03/2022 4 6 3 5 - 5 3 mmol/L Final     Comment:     Slightly Hemolyzed; Results May be Affected   02/24/2022 4 3 3 5 - 5 3 mmol/L Final   08/29/2019 3 6 3 5 - 5 3 mmol/L Final     Chloride   Date Value Ref Range Status   09/03/2022 104 96 - 108 mmol/L Final   02/24/2022 105 100 - 108 mmol/L Final   08/29/2019 104 100 - 108 mmol/L Final     CO2   Date Value Ref Range Status   09/03/2022 22 21 - 32 mmol/L Final   02/24/2022 24 21 - 32 mmol/L Final   08/29/2019 24 21 - 32 mmol/L Final     BUN   Date Value Ref Range Status   09/03/2022 26 (H) 5 - 25 mg/dL Final   02/24/2022 24 5 - 25 mg/dL Final   08/29/2019 20 5 - 25 mg/dL Final     Creatinine   Date Value Ref Range Status   09/03/2022 1 32 (H) 0 60 - 1 30 mg/dL Final     Comment:     Standardized to IDMS reference method   02/24/2022 1 31 (H) 0 60 - 1 30 mg/dL Final     Comment:     Standardized to IDMS reference method   08/29/2019 1 23 0 60 - 1 30 mg/dL Final     Comment:     Standardized to IDMS reference method     eGFR   Date Value Ref Range Status   09/03/2022 53 ml/min/1 73sq m Final   02/24/2022 54 ml/min/1 73sq m Final   08/29/2019 60 ml/min/1 73sq m Final     Calcium   Date Value Ref Range Status   09/03/2022 8 9 8 3 - 10 1 mg/dL Final   02/24/2022 8 6 8 3 - 10 1 mg/dL Final   08/29/2019 8 7 8 3 - 10 1 mg/dL Final     AST   Date Value Ref Range Status   09/03/2022 22 5 - 45 U/L Final     Comment:     Slightly Hemolyzed; Results May be Affected  Specimen collection should occur prior to Sulfasalazine administration due to the potential for falsely depressed results  02/24/2022 15 5 - 45 U/L Final     Comment:     Specimen collection should occur prior to Sulfasalazine administration due to the potential for falsely depressed results      08/29/2019 15 5 - 45 U/L Final     Comment:       Specimen collection should occur prior to Sulfasalazine administration due to the potential for falsely depressed results  ALT   Date Value Ref Range Status   09/03/2022 19 12 - 78 U/L Final     Comment:     Specimen collection should occur prior to Sulfasalazine administration due to the potential for falsely depressed results  02/24/2022 27 12 - 78 U/L Final     Comment:     Specimen collection should occur prior to Sulfasalazine administration due to the potential for falsely depressed results  08/29/2019 17 12 - 78 U/L Final     Comment:       Specimen collection should occur prior to Sulfasalazine administration due to the potential for falsely depressed results        Alkaline Phosphatase   Date Value Ref Range Status   09/03/2022 89 46 - 116 U/L Final   02/24/2022 83 46 - 116 U/L Final   08/29/2019 96 46 - 116 U/L Final     Magnesium   Date Value Ref Range Status   05/06/2016 1 7 1 6 - 2 6 mg/dL Final     WBC   Date Value Ref Range Status   09/03/2022 11 95 (H) 4 31 - 10 16 Thousand/uL Final   02/24/2022 10 50 (H) 4 31 - 10 16 Thousand/uL Final   08/29/2019 11 36 (H) 4 31 - 10 16 Thousand/uL Final     Hemoglobin   Date Value Ref Range Status   09/03/2022 13 0 12 0 - 17 0 g/dL Final   02/24/2022 12 7 12 0 - 17 0 g/dL Final   08/29/2019 13 7 12 0 - 17 0 g/dL Final     Platelets   Date Value Ref Range Status   09/03/2022 325 149 - 390 Thousands/uL Final   02/24/2022 320 149 - 390 Thousands/uL Final   08/29/2019 305 149 - 390 Thousands/uL Final     No results found for: PT, PTT, INR  No results found for: CKMB, DIGOXIN  No results found for: TSH  No results found for: CHOL   No results found for: HGBA1C  Urine Culture   Date Value Ref Range Status   09/03/2022 20,000-29,000 cfu/ml Klebsiella pneumoniae (A)  Final   09/03/2022 (A)  Final    20,000-29,000 cfu/ml Alpha Hemolytic Streptococcus NOT Enterococcus   02/24/2022 30,000-39,000 cfu/ml  Final     Comment:     Mixed Contaminants X4   2022 80,000-89,000 cfu/ml Aerococcus urinae (A)  Final     Comment:     Aerococcus urinae has been described as typically being susceptible to most Penicillins, Cephalosporins, and nitrofurantoin  Activity is variable with Fluoroquinolones and poor with Sulfonamides  Susceptibility is not normally performed on this organism  2016 50,000-59,000 cfu/ml Mixed Contaminants X4  Final     C difficile toxin by PCR   Date Value Ref Range Status   2016 NEGATIVE for C difficle toxin by PCR  NEGATIVE for C difficle toxin by PCR  Final       *-*-*-*-*-*-*-*-*-*-*-*-*-*-*-*-*-*-*-*-*-*-*-*-*-*-*-*-*-*-*-*-*-*-*-*-*-*-*-*-*-*-*-*-*-*-*-*-*-*-*-*-*-*-  PREVIOUS CARDIOLOGY & RADIOLOGY TEST RESULTS   I have personally reviewed pertinent results of cardiovascular tests noted below  Results for orders placed during the hospital encounter of 10/04/19    Echo complete with contrast if indicated    Narrative  [address] [place] [address], [address] [facility]  [phone]    Transthoracic Echocardiogram  2D, M-mode, Doppler, and Color Doppler    Study date:  04-Oct-2019    Patient: Char Reeves  MR number: [id number]  Account number: [de-identified]  : 1950  Age: 71 years  Gender: Male  Status: Outpatient  Location: [facility]  Height: 66 in  Weight: 150 9 lb  BP: 130/ 62 mmHg    Indications: Shortness of breath  Diagnoses: R06 02 - Shortness of breath    Sonographer:  GAETANO Caballero  Referring Physician:  Brittany Jones MD  Group:  [facility]  Interpreting Physician:  BRI Jones     SUMMARY    LEFT VENTRICLE:  Systolic function was normal by visual assessment  Ejection fraction was estimated to be 55 %  There were no regional wall motion abnormalities  Doppler parameters were consistent with abnormal left ventricular relaxation (grade 1 diastolic dysfunction)      MITRAL VALVE:  There was trace regurgitation  AORTIC VALVE:  The valve was not visualized well enough to rule out a bicuspid morphology  The right coronary cusp had markedly reduced motion and may demonstrate patrial fusion with the left cusp near the annulus  Leaflets exhibited moderately increased  thickness and moderate calcification  The right coronary cusp demonstrated moderate calcification, markedly reduced excursion, and reduced mobility  There was mild stenosis  VMax 2 04m/s, Mean Gradient 8 80 mmHg, RAD 1 8cm2    PULMONIC VALVE:  There was trace regurgitation  AORTA:  There was mild dilatation of the ascending aorta  HISTORY: PRIOR HISTORY: COPD  Hypertension  Smoker  PROCEDURE: The study was performed in the [address] [place] [address]  This was a routine study  The transthoracic approach was used  The study included complete 2D imaging, M-mode, complete spectral Doppler, and color Doppler  The  heart rate was 75 bpm, at the start of the study  Image quality was adequate  LEFT VENTRICLE: Size was normal  Systolic function was normal by visual assessment  Ejection fraction was estimated to be 55 %  There were no regional wall motion abnormalities  Wall thickness was normal  DOPPLER: Doppler parameters were  consistent with abnormal left ventricular relaxation (grade 1 diastolic dysfunction)  RIGHT VENTRICLE: The size was normal  Systolic function was normal  Wall thickness was normal     LEFT ATRIUM: Size was normal     RIGHT ATRIUM: Size was normal     MITRAL VALVE: Valve structure was normal  There was normal leaflet separation  DOPPLER: The transmitral velocity was within the normal range  There was no evidence for stenosis  There was trace regurgitation  AORTIC VALVE: The valve was not visualized well enough to rule out a bicuspid morphology  The right coronary cusp had markedly reduced motion and may demonstrate patrial fusion with the left cusp near the annulus   Leaflets exhibited  moderately increased thickness and moderate calcification  The right coronary cusp demonstrated moderate calcification, markedly reduced excursion, and reduced mobility  DOPPLER: There was mild stenosis  VMax 2 04m/s, Mean Gradient 8 80  mmHg, RAD 1 8cm2 There was no significant regurgitation  TRICUSPID VALVE: The valve structure was normal  There was normal leaflet separation  DOPPLER: The transtricuspid velocity was within the normal range  There was no evidence for stenosis  There was no regurgitation  The tricuspid jet  envelope definition was inadequate for estimation of RV systolic pressure  There are no indirect findings (abnormal RV volume or geometry, altered pulmonary flow velocity profile, or leftward septal displacement) which would suggest  moderate or severe pulmonary hypertension  PULMONIC VALVE: Not well visualized  DOPPLER: There was no evidence for stenosis  There was trace regurgitation  PERICARDIUM: There was no pericardial effusion  The pericardium was normal in appearance  AORTA: The root exhibited upper limit of normal size  There was mild dilatation of the ascending aorta  SYSTEMIC VEINS: IVC: The inferior vena cava was normal in size and course  Respirophasic changes were normal     SYSTEM MEASUREMENT TABLES    2D  %FS: 34 4 %  Ao Diam: 3 7 cm  EDV(Teich): 88 9 ml  EF Biplane: 61 9 %  EF(Teich): 63 6 %  ESV(Teich): 32 4 ml  IVSd: 1 cm  LA Area: 14 1 cm2  LA Diam: 3 cm  LAAs A4C: 14 cm2  LAESV A-L A4C: 35 6 ml  LAESV MOD A4C: 33 5 ml  LALs A4C: 4 6 cm  LVEDV MOD A2C: 52 ml  LVEDV MOD A4C: 59 7 ml  LVEDV MOD BP: 58 3 ml  LVEF MOD A2C: 57 5 %  LVEF MOD A4C: 63 3 %  LVESV MOD A2C: 22 1 ml  LVESV MOD A4C: 21 9 ml  LVESV MOD BP: 22 2 ml  LVIDd: 4 4 cm  LVIDs: 2 9 cm  LVLd A2C: 8 1 cm  LVLd A4C: 7 8 cm  LVLs A2C: 6 4 cm  LVLs A4C: 6 6 cm  LVOT Diam: 2 4 cm  LVPWd: 0 9 cm  RA Area: 13 6 cm2  RVIDd: 3 cm  SV MOD A2C: 29 9 ml  SV MOD A4C: 37 8 ml  SV(Teich): 56 6 ml    CW  AV Env  Ti: 355 2 ms  AV VTI: 48 7 cm  AV Vmax: 2 m/s  AV Vmean: 1 4 m/s  AV maxP 6 mmHg  AV meanP 8 mmHg    MM  TAPSE: 2 2 cm    PW  RAD (VTI): 1 9 cm2  RAD Vmax: 1 8 cm2  E': 0 1 m/s  E/E': 12 5  HR: 68 4 BPM  LVCO Dopp: 6 4 L/min  LVOT Env  Ti: 429 1 ms  LVOT VTI: 21 4 cm  LVOT Vmax: 0 8 m/s  LVOT Vmean: 0 5 m/s  LVOT maxP 7 mmHg  LVOT meanP 2 mmHg  LVSV Dopp: 94 ml  Lateral E': 0 1 m/s  MV A Irvin: 0 9 m/s  MV Dec Claiborne: 3 2 m/s2  MV DecT: 254 7 ms  MV E Irvin: 0 8 m/s  MV E/A Ratio: 0 9  MV PHT: 73 9 ms  MVA By PHT: 3 cm2    [employer] Accredited [facility]    Prepared and electronically signed by    BRI Phelps  Signed 04-Oct-2019 16:24:14    No results found for this or any previous visit  No results found for this or any previous visit  No results found for this or any previous visit  NM PET CT skull base to mid thigh  Narrative: PET/CT SCAN    INDICATION:  R91 1: Solitary pulmonary nodule   , 8 mm right lower lung nodule, history of prostate cancer ,    MODIFIER: PI    COMPARISON: Outside CT chest 2019    CELL TYPE:  None    TECHNIQUE:   11 4 mCi F-18-FDG administered IV  Multiplanar attenuation corrected and non attenuation corrected PET images were acquired 60 minutes post injection  Contiguous, low dose, axial CT sections were obtained from the skull base through the   femurs   Intravenous contrast material was not utilized  This examination, like all CT scans performed in the [facility], was performed utilizing techniques to minimize radiation dose exposure, including the use of iterative   reconstruction and automated exposure control  Fasting serum glucose: 104 mg/dl    FINDINGS:     VISUALIZED BRAIN:     No acute abnormalities are seen  HEAD/NECK:     3 mm right upper cervical node image 33 series 4, just above the level of the hyoid and superficial to the sternocleidomastoid muscle, demonstrates mild nonspecific FDG activity, SUV 1 8  Additional scattered small bilateral cervical nodes are   visualized  CT images: Unremarkable  CHEST:     8 x 7 mm right lower lung nodule series 4 image 85 appears stable in size to the prior CT  On the corresponding PET images, there appears to be mild associated with each activity in this region, SUV 1 9, however evaluation is limited due to   misregistration of this activity from the nodule or adjacent structure  Adjacent 4 mm nodule image 83 along the major fissure is also unchanged, but too small for accurate PET evaluation  4 mm right middle nodule image 86 is too small for accurate PET evaluation  Right middle pleural parenchymal scarring/ atelectasis, without significant FDG activity  There are shotty mediastinal, bilateral hilar and bilateral axillary nodes with nonspecific FDG activity  For example, right hilar SUV measures 5 2  Left hilar SUV measures 4  Cluster of small subcentimeter AP window nodes have an SUV of 4 5  Small   right paratracheal node image 69 measuring 5 mm has an SUV of 2 8  Right axillary node image 59 measures 1 9 x 1 1 cm, SUV 1 9  Normal fatty hilum is noted  Small left axillary node image 53 measures 6 mm, SUV 1 8  CT images: Coronary atherosclerosis  ABDOMEN:     Somewhat focal FDG activity along the left lateral wall of the distal abdominal aorta at the level of the bifurcation, SUV 5 3, is nonspecific  This may be physiologic or inflammatory  There are a few scattered small retroperitoneal nodes with mild nonspecific FDG activity  Example left retroperitoneal node image 127 measuring 4 mm short axis diameter has an SUV of 2 2  CT images: Right renal malrotation bilateral renal calcifications may represent a combination of vascular calcifications and small nonobstructing calculi  PELVIS:   Scattered small bilateral external iliac and inguinal nodes with mild nonspecific FDG activity    For example, left external iliac node image 179 measures 7 mm short axis diameter, SUV 2 8  Additional mild FDG activity in this region may be postsurgical   Example left inguinal node image 188 measures 4 mm, SUV 3 1  Right external iliac node image 179 measures 7 mm, SUV 2 6  Sigmoid diverticulosis with probable wall hypertrophy  There is associated FDG activity which may be physiologic  SUV measures 6 2  CT images: Otherwise unremarkable  OSSEOUS STRUCTURES:  No FDG avid lesions are seen  CT images: Spine degenerative change  Impression: 1  Stable size of 8 mm right lower lung nodule  Corresponding PET images appear to demonstrate mild FDG activity associated with this nodule, however there is misregistration artifact, and it is uncertain if this FDG activity is associated with an   adjacent structure  No additional corresponding abnormality visualized on the CT images  Given the stable but small size of the nodule, continued follow-up with CT in 3 months is suggested  2   There are shotty hypermetabolic nodes in the neck, thorax, abdomen and pelvis, of uncertain etiology, possibly from a chronic inflammatory process such as sarcoid  Underlying lymphoproliferative disorder however is not excluded  Clinical   correlation and follow-up also recommended  Majority of the nodes are the thorax, which may be reassessed on the follow-up CT as well  3   Sigmoid diverticulosis with wall thickening likely from hypertrophy  If coexistent occult neoplasm is suspected, direct visualization is recommended  The study was marked in EPIC for significant notification      Workstation performed: [id number]        *-*-*-*-*-*-*-*-*-*-*-*-*-*-*-*-*-*-*-*-*-*-*-*-*-*-*-*-*-*-*-*-*-*-*-*-*-*-*-*-*-*-*-*-*-*-*-*-*-*-*-*-*-*-  SIGNATURES:   @JQY@   Pollo Ron MD; DIAMOND    *-*-*-*-*-*-*-*-*-*-*-*-*-*-*-*-*-*-*-*-*-*-*-*-*-*-*-*-*-*-*-*-*-*-*-*-*-*-*-*-*-*-*-*-*-*-*-*-*-*-*-*-*-*-  PAST MEDICAL HISTORY:  Past Medical History:   Diagnosis Date   • Anemia    • Arthritis    • Cancer [facility])     prostate   • COPD (chronic obstructive pulmonary disease) ([facility] )    • History of transfusion 2005    auto transfusion   • Hyperlipidemia    • Hypertension    • Leaky heart valve    • Lung nodules     unsure of dx   • PVD (peripheral vascular disease) ([facility] )    • Tobacco abuse    • UTI (urinary tract infection)     PAST SURGICAL HISTORY:  Past Surgical History:   Procedure Laterality Date   • COLONOSCOPY     • HERNIA REPAIR      with mesh   • NV CYSTOSCOPY,DIL URETHRAL STRICTURE N/A 9/13/2022    Procedure: DILATATION URETHRAL, incision bladder neck contracture,cysto; Surgeon: Fede Benavides MD;  Location: [facility];  Service: Urology   • NV TRANSURETHRAL RESEC BLADDER NECK N/A 3/8/2022    Procedure: RESECTION TRANSURETHRAL BLADDER NECK CONTRACTURE;  Surgeon: Fede Benavides MD;  Location: [facility];  Service: Urology   • PROSTATECTOMY     • TONSILLECTOMY           FAMILY HISTORY:  No family history on file  SOCIAL HISTORY:  Social History     Tobacco Use   Smoking Status Every Day   • Packs/day: 1 00   • Types: Cigarettes   Smokeless Tobacco Never      Social History     Substance and Sexual Activity   Alcohol Use Yes   • Alcohol/week: 6 0 standard drinks   • Types: 6 Cans of beer per week     Social History     Substance and Sexual Activity   Drug Use Never    [id number]     *-*-*-*-*-*-*-*-*-*-*-*-*-*-*-*-*-*-*-*-*-*-*-*-*-*-*-*-*-*-*-*-*-*-*-*-*-*-*-*-*-*-*-*-*-*-*-*-*-*-*-*-*-*  ALLERGIES:  Allergies   Allergen Reactions   • Aspirin Other (See Comments)     Polyps in sinuses    • Nitrofurantoin GI Intolerance     Stomach cramping    • Sm Non-Asprin Sinus [Pseudoephedrine-Acetaminophen] Other (See Comments)     Patient advised to avoid due to polyps       • Ciprofloxacin Tachycardia   • Penicillins Rash   • Rosuvastatin Myalgia   • Sulfa Antibiotics Rash    CURRENT SCHEDULED MEDICATIONS:    Current Outpatient Medications:   •  albuterol (PROVENTIL HFA,VENTOLIN HFA) 90 mcg/act inhaler, INHALE 2 PUFFS BY MOUTH EVERY FOUR HOURS HRLY AS NEEDED FOR BREATHING, Disp: , Rfl:   •  cholecalciferol (VITAMIN D3) 1,000 units tablet, Take 1,000 Units by mouth daily at bedtime, Disp: , Rfl:   •  cilostazol (PLETAL) 100 mg tablet, TAKE 100MG (1 TABLET) BY MOUTH TWICE A DAY, Disp: , Rfl:   •  clopidogrel (PLAVIX) 75 mg tablet, 75 mg, Disp: , Rfl:   •  ferrous sulfate 325 (65 Fe) mg tablet, Take 325 mg by mouth daily at bedtime, Disp: , Rfl:   •  losartan (COZAAR) 100 MG tablet, TAKE ONE-HALF TABLET BY MOUTH EVERY DAY FOR BLOOD PRESSURE/HEART, Disp: , Rfl:   •  pravastatin (PRAVACHOL) 80 mg tablet, TAKE 40MG (ONE-HALF TABLET) BY MOUTH EVERY DAY AT 5 PM FOR CHOLESTEROL, Disp: , Rfl:      *-*-*-*-*-*-*-*-*-*-*-*-*-*-*-*-*-*-*-*-*-*-*-*-*-*-*-*-*-*-*-*-*-*-*-*-*-*-*-*-*-*-*-*-*-*-*-*-*-*-*-*-*-*

## 2023-02-14 NOTE — PATIENT INSTRUCTIONS
CARDIOLOGY ASSESSMENT & PLAN     1  Nonrheumatic aortic valve stenosis  Echo complete w/ contrast if indicated      2  Primary hypertension  POCT ECG    Echo complete w/ contrast if indicated    Holter monitor      3  Intermittent palpitations  Holter monitor      4  Moderate aortic valve stenosis        5  Stage 3b chronic kidney disease ([address] )        6  Shortness of breath on exertion          Moderate aortic valve stenosis  Mr Brody Gilman has moderate aortic valve stenosis by clinical examination and by last echocardiogram in 2021  He has several nonspecific symptoms  He does have shortness of breath with activity which is likely multifactorial due to his smoking history  On examination there is no evidence of pulmonary or peripheral vascular congestion  Blood pressure is stable  He reports feeling tachycardia with activity  ECG today shows nonspecific abnormalities  -- At this time I am advising him to continue current medications  -- We will arrange for a follow-up echocardiogram and a Holter monitor to evaluate his symptoms and known aortic valve stenosis  -- Again I strongly urged him to quit smoking completely as this is a single major risk factor for future cardiovascular disease   -- We will plan for a 6-month follow-up visit  -- He will continue to follow with his vascular surgeons at the [facility] regarding his peripheral artery disease and asymptomatic carotid artery disease  -- Advising him addition of nonpharmacologic measures to improve cholesterol and hypertension  Some of these are outlined below  NON-PHARMACOLOGIC MEASURES THAT IMPROVE CHOLESTEROL       1  Reduce intake of saturated fats to less than 7% of total calories  Some helpful tips to achieve this include:        -- use egg whites instead of whole eggs  -- use lean turkey-odonnell instead of regular odonnell        -- use low-fat margarine on your toast  instead of butter          -- use skim milk instead of whole milk  -- use Soy-based products as such as Soy milk, Tofu,    2  Reduced intake of dietary cholesterol to less than 200 mg per day  3  Increase intake of plant sterols and  viscous soluble fiber  -- Plant sterols and stanols (active daily doses, 400-3000 mg; expected LDL-C reduction of 8%-12%)  Plant sterols and stanols put in fat medium such as Margarine have been shown to lower LDL cholesterol ('Benecol', 'Smart Balance', Take Control' and similar products)  --   Soluble fibers including beta-glucan, psyllium, and glucomannan (active daily doses, 5-15 g; expected LDL-C reduction of 5%-15%)          Soluble viscous fibers such as  Oat ?-glucan (Oat Fiber) and Psyllium husk ( Metamucil etc ) lower cholesterol by reducing absorption of cholesterol bile acids and           delaying digestion of lipids  4  Increase consumption of nuts, especially as a replacement for candy bars or other high simple-carbohydrate foods (Nuts, especially walnuts and almonds are rich in mono or polyunsaturated fats that lower LDL cholesterol  They also reduced the glycemic load of the diet and may reduce insulin secretion and improve satiety  There also rich in a my no acid arginine, which is the precursor of nitric oxide that promote vasodilation )    5  Reduce weight through increasing physical activity/exercise and modifying diet  6  Increase dietary intake of Omega 3 fatty acids ( found predominantly in fatty fish such as [place]/Tuna/Mackeral/Sardines/Herring as well as fish oil supplements)  Omega 3 fatty acids reduce plasma concentrations of triglycerides and decreased overall risk for coronary heart disease  7  Limit alcohol intake and frequency to minimum  8  Consider taking Nutraceutical supplements that may further lower blood cholesterol:  1   Berberine (active daily doses, 500-1500 mg; expected LDL-C reduction of 91%-08%);   2  Garlic (active daily doses, 5-6 g; expected LDL-C reduction of 5%-10%);  3  Green tea extracts (active daily doses,  g; expected LDL-C reduction of 5%)     HEALTH TIPS FOR PATIENTS WITH ABNORMAL CHOLESTEROL LEVELS INCLUDING ELEVATED TRIGLYCERIDES:    1  Sugar intake should be restricted  Most sugars, especially fructose, stimulatehepatic synthesis of free fatty acids ( FFAs) and thus FFA and triglycerides (TG) accumulation in the liver  Thisaccumulation drives VLDL production and increases plasma TG level in the mild to moderate range (u943-071 mg/dL)  Fructose is prevalent in the Western diet,being about half the content of sucrose (white or brown sugar), of high-fructosecorn syrup, and also of honey, and being more than half the content of the sugarin fruit juice and certain other sweeteners, like agave  Thus, restriction of sugar-sweetened beverages, other added sugars, and fruit juice (but not fruit) is usuallyhelpful to reduce TG levels  Nonsugar carbohydrate (starch) has a weak TGlevel-raising effect, mainly if low in fiber, so restricting total carbohydrates mayslightly reduce TG levels  2  Increase dietary fiber intake-- fibrous food lowers obstruction of bad cholesterol in the blood stream   Fiber has a modest effect to blunt TG level increase with sugar and other carbohydrates  3  Fat intake may need to be restricted  Dietary fat drives chylomicron production andrestricting dietary fat is the most effective way to reduce TG levels of more thanabout 800 mg/dL  Saturated fats are slightly more likely to lead to HTG than unsat-urated fats, but fatty fish has a paradoxic effect of lowering TG levels because ofthe several favorable effects of omega-3 oil on TG metabolism  4  Physical activity should be increased   Increased exercise tends to reduce TGlevels, likely because of increased muscle TG oxidation as a fuel source, improvedglucose and insulin metabolism (including increased LPL activity/TG lipolysis), anddecreased hepatic TG and FFA content  5  Weight loss should be encouraged  Whether by decreasing total calories orincreasing activity, negative caloric balance reduces TG levels, likely related toimprovement in insulin resistance and decreased hepatic TG and FFA content  6  Restriction of alcohol intake may be beneficial  Ethanol increases TG levels in manypatients at any level of intake, and so a trial of decreased consumption is warrantedfor plasma TG levels of more than about 200 mg/dL in patients who consume morethan about 2 servings per week  Tobacco and marijuana have minor TG level-raising effects that are generally not of clinical importance  7  Home remedies to reduce triglycerides:  Fresh garlic (Eat 2-3 raw garlic close on empty stomach daily), apple cider vinegar,Cayenne pepper, Cinnamon, Coriander seeds, Joey mushroom, Nuts ( walnuts, almonds, nikhil nuts- Nuts are rich in polyunsaturated fatty acids, this component helps to reduce bad cholesterol)

## 2023-02-14 NOTE — ASSESSMENT & PLAN NOTE
Mr Emilie Thompson has moderate aortic valve stenosis by clinical examination and by last echocardiogram in 2021  He has several nonspecific symptoms  He does have shortness of breath with activity which is likely multifactorial due to his smoking history  On examination there is no evidence of pulmonary or peripheral vascular congestion  Blood pressure is stable  He reports feeling tachycardia with activity  ECG today shows nonspecific abnormalities  -- At this time I am advising him to continue current medications  -- We will arrange for a follow-up echocardiogram and a Holter monitor to evaluate his symptoms and known aortic valve stenosis  -- Again I strongly urged him to quit smoking completely as this is a single major risk factor for future cardiovascular disease   -- We will plan for a 6-month follow-up visit  -- He will continue to follow with his vascular surgeons at the [address] regarding his peripheral artery disease and asymptomatic carotid artery disease  -- Advising him addition of nonpharmacologic measures to improve cholesterol and hypertension  Some of these are outlined below  NON-PHARMACOLOGIC MEASURES THAT IMPROVE CHOLESTEROL       1  Reduce intake of saturated fats to less than 7% of total calories  Some helpful tips to achieve this include:        -- use egg whites instead of whole eggs  -- use lean turkey-odonnell instead of regular odonnell        -- use low-fat margarine on your toast  instead of butter  -- use skim milk instead of whole milk  -- use Soy-based products as such as Soy milk, Tofu,    2  Reduced intake of dietary cholesterol to less than 200 mg per day  3  Increase intake of plant sterols and  viscous soluble fiber  -- Plant sterols and stanols (active daily doses, 400-3000 mg; expected LDL-C reduction of 8%-12%)            Plant sterols and stanols put in fat medium such as Margarine have been shown to lower LDL cholesterol ('Benecol', 'Smart Balance', Take Control' and similar products)  --   Soluble fibers including beta-glucan, psyllium, and glucomannan (active daily doses, 5-15 g; expected LDL-C reduction of 5%-15%)          Soluble viscous fibers such as  Oat ?-glucan (Oat Fiber) and Psyllium husk ( Metamucil etc ) lower cholesterol by reducing absorption of cholesterol bile acids and           delaying digestion of lipids  4  Increase consumption of nuts, especially as a replacement for candy bars or other high simple-carbohydrate foods (Nuts, especially walnuts and almonds are rich in mono or polyunsaturated fats that lower LDL cholesterol  They also reduced the glycemic load of the diet and may reduce insulin secretion and improve satiety  There also rich in a my no acid arginine, which is the precursor of nitric oxide that promote vasodilation )    5  Reduce weight through increasing physical activity/exercise and modifying diet  6  Increase dietary intake of Omega 3 fatty acids ( found predominantly in fatty fish such as [place]/Tuna/Mackeral/Sardines/Herring as well as fish oil supplements)  Omega 3 fatty acids reduce plasma concentrations of triglycerides and decreased overall risk for coronary heart disease  7  Limit alcohol intake and frequency to minimum  8  Consider taking Nutraceutical supplements that may further lower blood cholesterol:  1  Berberine (active daily doses, 500-1500 mg; expected LDL-C reduction of 86%-28%);   2  Garlic (active daily doses, 5-6 g; expected LDL-C reduction of 5%-10%);  3  Green tea extracts (active daily doses,  g; expected LDL-C reduction of 5%)     HEALTH TIPS FOR PATIENTS WITH ABNORMAL CHOLESTEROL LEVELS INCLUDING ELEVATED TRIGLYCERIDES:    1  Sugar intake should be restricted  Most sugars, especially fructose, stimulatehepatic synthesis of free fatty acids ( FFAs) and thus FFA and triglycerides (TG) accumulation in the liver   Thisaccumulation drives VLDL production and increases plasma TG level in the mild to moderate range (r593-238 mg/dL)  Fructose is prevalent in the Western diet,being about half the content of sucrose (white or brown sugar), of high-fructosecorn syrup, and also of honey, and being more than half the content of the sugarin fruit juice and certain other sweeteners, like agave  Thus, restriction of sugar-sweetened beverages, other added sugars, and fruit juice (but not fruit) is usuallyhelpful to reduce TG levels  Nonsugar carbohydrate (starch) has a weak TGlevel-raising effect, mainly if low in fiber, so restricting total carbohydrates mayslightly reduce TG levels  2  Increase dietary fiber intake-- fibrous food lowers obstruction of bad cholesterol in the blood stream   Fiber has a modest effect to blunt TG level increase with sugar and other carbohydrates  3  Fat intake may need to be restricted  Dietary fat drives chylomicron production andrestricting dietary fat is the most effective way to reduce TG levels of more thanabout 800 mg/dL  Saturated fats are slightly more likely to lead to HTG than unsat-urated fats, but fatty fish has a paradoxic effect of lowering TG levels because ofthe several favorable effects of omega-3 oil on TG metabolism  4  Physical activity should be increased  Increased exercise tends to reduce TGlevels, likely because of increased muscle TG oxidation as a fuel source, improvedglucose and insulin metabolism (including increased LPL activity/TG lipolysis), anddecreased hepatic TG and FFA content  5  Weight loss should be encouraged  Whether by decreasing total calories orincreasing activity, negative caloric balance reduces TG levels, likely related toimprovement in insulin resistance and decreased hepatic TG and FFA content      6  Restriction of alcohol intake may be beneficial  Ethanol increases TG levels in manypatients at any level of intake, and so a trial of decreased consumption is warrantedfor plasma TG levels of more than about 200 mg/dL in patients who consume morethan about 2 servings per week  Tobacco and marijuana have minor TG level-raising effects that are generally not of clinical importance  7  Home remedies to reduce triglycerides:  Fresh garlic (Eat 2-3 raw garlic close on empty stomach daily), apple cider vinegar,Cayenne pepper, Cinnamon, Coriander seeds, Joey mushroom, Nuts ( walnuts, almonds, nikhil nuts- Nuts are rich in polyunsaturated fatty acids, this component helps to reduce bad cholesterol)

## 2023-02-15 ENCOUNTER — APPOINTMENT (OUTPATIENT)
Dept: LAB | Facility: HOSPITAL | Age: 73
End: 2023-02-15

## 2023-02-15 DIAGNOSIS — C61 PROSTATE CANCER (HCC): ICD-10-CM

## 2023-02-15 DIAGNOSIS — N32.0 BLADDER NECK CONTRACTURE: ICD-10-CM

## 2023-02-15 LAB
ATRIAL RATE: 85 BPM
P AXIS: 71 DEGREES
PR INTERVAL: 152 MS
PSA SERPL-MCNC: 0.3 NG/ML (ref 0–4)
QRS AXIS: 42 DEGREES
QRSD INTERVAL: 74 MS
QT INTERVAL: 372 MS
QTC INTERVAL: 442 MS
T WAVE AXIS: 73 DEGREES
VENTRICULAR RATE: 85 BPM

## 2023-02-26 ENCOUNTER — HOSPITAL ENCOUNTER (INPATIENT)
Facility: HOSPITAL | Age: 73
LOS: 2 days | Discharge: HOME | End: 2023-03-01
Attending: EMERGENCY MEDICINE | Admitting: INTERNAL MEDICINE

## 2023-02-26 ENCOUNTER — APPOINTMENT (EMERGENCY)
Dept: CT IMAGING | Facility: HOSPITAL | Age: 73
End: 2023-02-26

## 2023-02-26 DIAGNOSIS — Z79.01 ANTICOAGULATED: ICD-10-CM

## 2023-02-26 DIAGNOSIS — K57.90 DIVERTICULOSIS: ICD-10-CM

## 2023-02-26 DIAGNOSIS — R19.7 DIARRHEA: ICD-10-CM

## 2023-02-26 DIAGNOSIS — Z72.0 TOBACCO ABUSE: ICD-10-CM

## 2023-02-26 DIAGNOSIS — N18.9 CKD (CHRONIC KIDNEY DISEASE): ICD-10-CM

## 2023-02-26 DIAGNOSIS — K92.2 GI BLEED: ICD-10-CM

## 2023-02-26 DIAGNOSIS — D75.839 THROMBOCYTOSIS: ICD-10-CM

## 2023-02-26 DIAGNOSIS — D64.9 ANEMIA: Primary | ICD-10-CM

## 2023-02-26 DIAGNOSIS — R10.9 ACUTE ABDOMINAL PAIN: ICD-10-CM

## 2023-02-26 LAB
ABO GROUP BLD: NORMAL
ABO GROUP BLD: NORMAL
ALBUMIN SERPL BCP-MCNC: 4 G/DL (ref 3.5–5)
ALP SERPL-CCNC: 75 U/L (ref 34–104)
ALT SERPL W P-5'-P-CCNC: 10 U/L (ref 7–52)
ANION GAP SERPL CALCULATED.3IONS-SCNC: 9 MMOL/L (ref 4–13)
APTT PPP: 32 SECONDS (ref 23–37)
AST SERPL W P-5'-P-CCNC: 11 U/L (ref 13–39)
ATRIAL RATE: 86 BPM
BASOPHILS # BLD AUTO: 0.08 THOUSANDS/ÂΜL (ref 0–0.1)
BASOPHILS NFR BLD AUTO: 1 % (ref 0–1)
BILIRUB SERPL-MCNC: 0.26 MG/DL (ref 0.2–1)
BLD GP AB SCN SERPL QL: NEGATIVE
BUN SERPL-MCNC: 21 MG/DL (ref 5–25)
CALCIUM SERPL-MCNC: 8.9 MG/DL (ref 8.4–10.2)
CHLORIDE SERPL-SCNC: 113 MMOL/L (ref 96–108)
CO2 SERPL-SCNC: 16 MMOL/L (ref 21–32)
CREAT SERPL-MCNC: 1.34 MG/DL (ref 0.6–1.3)
EOSINOPHIL # BLD AUTO: 0.22 THOUSAND/ÂΜL (ref 0–0.61)
EOSINOPHIL NFR BLD AUTO: 2 % (ref 0–6)
ERYTHROCYTE [DISTWIDTH] IN BLOOD BY AUTOMATED COUNT: 14.9 % (ref 11.6–15.1)
GFR SERPL CREATININE-BSD FRML MDRD: 52 ML/MIN/1.73SQ M
GLUCOSE SERPL-MCNC: 106 MG/DL (ref 65–140)
HCT VFR BLD AUTO: 25.4 % (ref 36.5–49.3)
HCT VFR BLD AUTO: 28.2 % (ref 36.5–49.3)
HGB BLD-MCNC: 8 G/DL (ref 12–17)
HGB BLD-MCNC: 8.8 G/DL (ref 12–17)
IMM GRANULOCYTES # BLD AUTO: 0.05 THOUSAND/UL (ref 0–0.2)
IMM GRANULOCYTES NFR BLD AUTO: 1 % (ref 0–2)
INR PPP: 1.05 (ref 0.84–1.19)
LACTATE SERPL-SCNC: 0.7 MMOL/L (ref 0.5–2)
LIPASE SERPL-CCNC: 11 U/L (ref 11–82)
LYMPHOCYTES # BLD AUTO: 2.88 THOUSANDS/ÂΜL (ref 0.6–4.47)
LYMPHOCYTES NFR BLD AUTO: 26 % (ref 14–44)
MCH RBC QN AUTO: 29.6 PG (ref 26.8–34.3)
MCHC RBC AUTO-ENTMCNC: 31.2 G/DL (ref 31.4–37.4)
MCV RBC AUTO: 95 FL (ref 82–98)
MONOCYTES # BLD AUTO: 0.94 THOUSAND/ÂΜL (ref 0.17–1.22)
MONOCYTES NFR BLD AUTO: 9 % (ref 4–12)
NEUTROPHILS # BLD AUTO: 6.73 THOUSANDS/ÂΜL (ref 1.85–7.62)
NEUTS SEG NFR BLD AUTO: 61 % (ref 43–75)
NRBC BLD AUTO-RTO: 0 /100 WBCS
P AXIS: 47 DEGREES
PLATELET # BLD AUTO: 489 THOUSANDS/UL (ref 149–390)
PMV BLD AUTO: 9.1 FL (ref 8.9–12.7)
POTASSIUM SERPL-SCNC: 3.5 MMOL/L (ref 3.5–5.3)
PR INTERVAL: 146 MS
PROT SERPL-MCNC: 7.6 G/DL (ref 6.4–8.4)
PROTHROMBIN TIME: 13.7 SECONDS (ref 11.6–14.5)
QRS AXIS: 37 DEGREES
QRSD INTERVAL: 78 MS
QT INTERVAL: 384 MS
QTC INTERVAL: 434 MS
RBC # BLD AUTO: 2.97 MILLION/UL (ref 3.88–5.62)
RH BLD: POSITIVE
RH BLD: POSITIVE
SODIUM SERPL-SCNC: 138 MMOL/L (ref 135–147)
SPECIMEN EXPIRATION DATE: NORMAL
T WAVE AXIS: 23 DEGREES
VENTRICULAR RATE: 77 BPM
WBC # BLD AUTO: 10.9 THOUSAND/UL (ref 4.31–10.16)

## 2023-02-26 RX ORDER — ALBUTEROL SULFATE 90 UG/1
2 AEROSOL, METERED RESPIRATORY (INHALATION) EVERY 4 HOURS PRN
Status: DISCONTINUED | OUTPATIENT
Start: 2023-02-26 | End: 2023-03-01 | Stop reason: HOSPADM

## 2023-02-26 RX ORDER — FERROUS SULFATE 325(65) MG
325 TABLET ORAL
Status: DISCONTINUED | OUTPATIENT
Start: 2023-02-26 | End: 2023-03-01 | Stop reason: HOSPADM

## 2023-02-26 RX ORDER — LOSARTAN POTASSIUM 50 MG/1
50 TABLET ORAL DAILY
Status: DISCONTINUED | OUTPATIENT
Start: 2023-02-26 | End: 2023-03-01 | Stop reason: HOSPADM

## 2023-02-26 RX ORDER — PANTOPRAZOLE SODIUM 40 MG/10ML
40 INJECTION, POWDER, LYOPHILIZED, FOR SOLUTION INTRAVENOUS EVERY 12 HOURS SCHEDULED
Status: DISCONTINUED | OUTPATIENT
Start: 2023-02-26 | End: 2023-02-26

## 2023-02-26 RX ORDER — NICOTINE 21 MG/24HR
1 PATCH, TRANSDERMAL 24 HOURS TRANSDERMAL DAILY
Status: DISCONTINUED | OUTPATIENT
Start: 2023-02-26 | End: 2023-03-01 | Stop reason: HOSPADM

## 2023-02-26 RX ORDER — PANTOPRAZOLE SODIUM 40 MG/10ML
40 INJECTION, POWDER, LYOPHILIZED, FOR SOLUTION INTRAVENOUS ONCE
Status: COMPLETED | OUTPATIENT
Start: 2023-02-26 | End: 2023-02-26

## 2023-02-26 RX ORDER — PRAVASTATIN SODIUM 80 MG/1
80 TABLET ORAL
Status: DISCONTINUED | OUTPATIENT
Start: 2023-02-26 | End: 2023-03-01 | Stop reason: HOSPADM

## 2023-02-26 RX ADMIN — SODIUM CHLORIDE 8 MG/HR: 9 INJECTION, SOLUTION INTRAVENOUS at 13:46

## 2023-02-26 RX ADMIN — SODIUM CHLORIDE 8 MG/HR: 9 INJECTION, SOLUTION INTRAVENOUS at 21:41

## 2023-02-26 RX ADMIN — LOSARTAN POTASSIUM 50 MG: 50 TABLET, FILM COATED ORAL at 17:38

## 2023-02-26 RX ADMIN — PANTOPRAZOLE SODIUM 40 MG: 40 INJECTION, POWDER, FOR SOLUTION INTRAVENOUS at 13:40

## 2023-02-26 RX ADMIN — IOHEXOL 100 ML: 350 INJECTION, SOLUTION INTRAVENOUS at 12:38

## 2023-02-26 RX ADMIN — PRAVASTATIN SODIUM 80 MG: 80 TABLET ORAL at 17:38

## 2023-02-26 RX ADMIN — Medication 325 MG: at 21:43

## 2023-02-26 NOTE — PLAN OF CARE
Problem: Potential for Falls  Goal: Patient will remain free of falls  Description: INTERVENTIONS:  - Educate patient/family on patient safety including physical limitations  - Instruct patient to call for assistance with activity   - Consult OT/PT to assist with strengthening/mobility   - Keep Call bell within reach  - Keep bed low and locked with side rails adjusted as appropriate  - Keep care items and personal belongings within reach  - Initiate and maintain comfort rounds  - Make Fall Risk Sign visible to staff  - Apply yellow socks and bracelet for high fall risk patients  - Consider moving patient to room near nurses station  Outcome: Progressing     Problem: CARDIOVASCULAR - ADULT  Goal: Maintains optimal cardiac output and hemodynamic stability  Description: INTERVENTIONS:  - Monitor I/O, vital signs and rhythm  - Monitor for S/S and trends of decreased cardiac output  - Administer and titrate ordered vasoactive medications to optimize hemodynamic stability  - Assess quality of pulses, skin color and temperature  - Assess for signs of decreased coronary artery perfusion  - Instruct patient to report change in severity of symptoms  Outcome: Progressing  Goal: Absence of cardiac dysrhythmias or at baseline rhythm  Description: INTERVENTIONS:  - Continuous cardiac monitoring, vital signs, obtain 12 lead EKG if ordered  - Administer antiarrhythmic and heart rate control medications as ordered  - Monitor electrolytes and administer replacement therapy as ordered  Outcome: Progressing     Problem: GASTROINTESTINAL - ADULT  Goal: Maintains or returns to baseline bowel function  Description: INTERVENTIONS:  - Assess bowel function  - Encourage oral fluids to ensure adequate hydration  - Administer IV fluids if ordered to ensure adequate hydration  - Administer ordered medications as needed  - Encourage mobilization and activity  - Consider nutritional services referral to assist patient with adequate nutrition and appropriate food choices  Outcome: Progressing     Problem: HEMATOLOGIC - ADULT  Goal: Maintains hematologic stability  Description: INTERVENTIONS  - Assess for signs and symptoms of bleeding or hemorrhage  - Monitor labs  - Administer supportive blood products/factors as ordered and appropriate  Outcome: Progressing

## 2023-02-26 NOTE — H&P
[address] [facility]  H&P- Marline Cheek 1950, 68 y o  male MRN: [id number]  Unit/Bed#: ED-04 Encounter: [id number]  Primary Care Provider: Omar Bell MD   Date and time admitted to hospital: 2/26/2023 10:50 AM    * GI bleed  Assessment & Plan  Complains of dark black stools  He is on plavix for PAD    No epigastric pain  No NSAID use  Protonix IV BID  Consult GI  Keep NPO after midnight  Hold plavix    Anemia  Assessment & Plan  Due to acute blood loss  Transfuse if hemoglobin drops below 7    Tobacco abuse  Assessment & Plan  Nicotine patch    PVD (peripheral vascular disease) ([facility])  Assessment & Plan  Hold Plavix and Pletal with GI bleed        Chief Complaint:   Black stools      History of Present Illness:    Marline Cheek is a 68 y o  male who presents with black stools  They are painless  But he is noted they are much more black than usual   Usually a little black because of his iron tablets but now they have gotten more black  Evenings he is coffee grounds in his stool sometimes  No epigastric pain  No pain with eating  He does not use any over-the-counter pain medications other than Tylenol  No nausea or vomiting  There is no redness in the stool  He does take Plavix chronically for his lower extremity peripheral vascular disease         Review of Systems:    Review of Systems   Constitutional: Negative for chills and fever  HENT: Negative for ear pain and sore throat  Eyes: Negative for pain and visual disturbance  Respiratory: Negative for cough and shortness of breath  Cardiovascular: Negative for chest pain and palpitations  Gastrointestinal: Positive for blood in stool  Negative for abdominal pain and vomiting  Genitourinary: Negative for dysuria and hematuria  Musculoskeletal: Negative for arthralgias and back pain  Skin: Negative for color change and rash  Neurological: Negative for seizures and syncope     All other systems reviewed and are negative  Past Medical and Surgical History:     Past Medical History:   Diagnosis Date   • Anemia    • Arthritis    • Cancer [facility])     prostate   • COPD (chronic obstructive pulmonary disease) ([facility] [address] )    • History of transfusion 2005    auto transfusion   • Hyperlipidemia    • Hypertension    • Leaky heart valve    • Lung nodules     unsure of dx   • PVD (peripheral vascular disease) (HCC)    • Tobacco abuse    • UTI (urinary tract infection)        Past Surgical History:   Procedure Laterality Date   • COLONOSCOPY     • HERNIA REPAIR      with mesh   • [place] CYSTO CALIBRATION DILAT URTL STRIX/STENOSIS N/A 9/13/2022    Procedure: DILATATION URETHRAL, incision bladder neck contracture,cysto; Surgeon: Vika Da Silva MD;  [facility];  Service: Urology   • [place] TRANSURETHRAL RESECTION BLADDER NECK N/A 3/8/2022    Procedure: RESECTION TRANSURETHRAL BLADDER NECK CONTRACTURE;  Surgeon: Vika Da Silva MD;  [facility];  Service: Urology   • PROSTATECTOMY     • TONSILLECTOMY           Home Medications:    Prior to Admission medications    Medication Sig Start Date End Date Taking?  Authorizing Provider   albuterol (PROVENTIL HFA,VENTOLIN HFA) 90 mcg/act inhaler INHALE 2 PUFFS BY MOUTH EVERY FOUR HOURS HRLY AS NEEDED FOR BREATHING 12/13/21  Yes Historical Provider, MD   cholecalciferol (VITAMIN D3) 1,000 units tablet Take 1,000 Units by mouth daily at bedtime   Yes Historical Provider, MD   cilostazol (PLETAL) 100 mg tablet TAKE 100MG (1 TABLET) BY MOUTH TWICE A DAY 11/17/22  Yes Historical Provider, MD   clopidogrel (PLAVIX) 75 mg tablet 75 mg 11/17/22  Yes Historical Provider, MD   ferrous sulfate 325 (65 Fe) mg tablet Take 325 mg by mouth daily at bedtime   Yes Historical Provider, MD   losartan (COZAAR) 100 MG tablet TAKE ONE-HALF TABLET BY MOUTH EVERY DAY FOR BLOOD PRESSURE/HEART 1/27/22  Yes Historical Provider, MD   pravastatin (PRAVACHOL) 80 mg tablet TAKE 40MG (ONE-HALF TABLET) BY MOUTH EVERY DAY AT 5 PM FOR CHOLESTEROL 1/20/23  Yes Historical Provider, MD     I have reviewed home medications with patient personally  Allergies: Allergies   Allergen Reactions   • Aspirin Other (See Comments)     Polyps in sinuses    • Nitrofurantoin GI Intolerance     Stomach cramping    • Sm Non-Asprin Sinus [Pseudoephedrine-Acetaminophen] Other (See Comments)     Patient advised to avoid due to polyps   • Ciprofloxacin Tachycardia   • Penicillins Rash   • Rosuvastatin Myalgia   • Sulfa Antibiotics Rash         Social History:    Substance Use History:   Social History     Substance and Sexual Activity   Alcohol Use Yes   • Alcohol/week: 6 0 standard drinks   • Types: 6 Cans of beer per week     Social History     Tobacco Use   Smoking Status Every Day   • Packs/day: 1 00   • Types: Cigarettes   Smokeless Tobacco Never     Social History     Substance and Sexual Activity   Drug Use Never         Family History:    non-contributory      Physical Exam:     Vitals:   Blood Pressure: 143/65 (02/26/23 1345)  Pulse: 76 (02/26/23 1345)  Temperature: 97 7 °F (36 5 °C) (02/26/23 1057)  Temp Source: Oral (02/26/23 1057)  Respirations: 18 (02/26/23 1345)  Weight - Scale: 66 6 kg (146 lb 13 2 oz) (02/26/23 1056)  SpO2: 98 % (02/26/23 1345)    Physical Exam  Vitals and nursing note reviewed  HENT:      Head: Normocephalic and atraumatic  Eyes:      Pupils: Pupils are equal, round, and reactive to light  Cardiovascular:      Rate and Rhythm: Normal rate and regular rhythm  Heart sounds: No murmur heard  No friction rub  No gallop  Pulmonary:      Effort: Pulmonary effort is normal       Breath sounds: Normal breath sounds  No wheezing or rales  Abdominal:      General: Bowel sounds are normal       Palpations: Abdomen is soft  Tenderness: There is no abdominal tenderness  Musculoskeletal:      Right lower leg: No edema  Left lower leg: No edema                Additional Data:     Lab Results: I have personally reviewed pertinent reports  Results from last 7 days   Lab Units 02/26/23  1116   WBC Thousand/uL 10 90*   HEMOGLOBIN g/dL 8 8*   HEMATOCRIT % 28 2*   PLATELETS Thousands/uL 489*   NEUTROS PCT % 61   LYMPHS PCT % 26   MONOS PCT % 9   EOS PCT % 2     Results from last 7 days   Lab Units 02/26/23  1116   POTASSIUM mmol/L 3 5   CHLORIDE mmol/L 113*   CO2 mmol/L 16*   BUN mg/dL 21   CREATININE mg/dL 1 34*   CALCIUM mg/dL 8 9   ALK PHOS U/L 75   ALT U/L 10   AST U/L 11*     Results from last 7 days   Lab Units 02/26/23  1136   INR  1 05                 Imaging: I have personally reviewed pertinent reports  CT abdomen pelvis with contrast   Final Result by Shawnee Rivas MD (02/26 0652)      Sigmoid diverticulosis with chronic diverticular disease  Coexisting acute sigmoid diverticulitis cannot be entirely excluded  Otherwise, no evidence of acute abnormality in the abdomen or pelvis  Workstation performed: [id number]                   VTE Prophylaxis: none with GI bleed        Anticipated Length of Stay:  Patient will be admitted on an inpatient basis with an anticipated length of stay of  Greater than 2 midnights  Justification for Hospital Stay: She has GI bleed  Needs a Protonix drip  Needs EGD possible colonoscopy  Length of stay to be greater than 2 midnights        ** Please Note: This note has been constructed using a voice recognition system   **

## 2023-02-26 NOTE — ASSESSMENT & PLAN NOTE
Complains of dark black stools  He is on plavix for PAD    No epigastric pain  No NSAID use  Protonix IV BID  Consult GI  Keep NPO after midnight    Hold plavix

## 2023-02-26 NOTE — ED PROVIDER NOTES
History  Chief Complaint   Patient presents with   • Nausea     Pt reports nausea and visual disturbances since staring plavix 2 months ago  • Diarrhea     Pt reports 1-2 episodes of diarrhea; reports dark and tarry  Does take iron daily  49-year-old male presents for evaluation of 2 weeks of intermittent abdominal cramping with dark tarry diarrhea  He states that it occurs several times a day  It is getting worse  He is on Plavix  Associated with nausea but no vomiting, no back or flank pain, no fevers, chills  Nausea  The primary symptoms include abdominal pain and diarrhea  Primary symptoms do not include fever, fatigue, nausea, vomiting, dysuria, myalgias, arthralgias or rash  The illness does not include constipation  Diarrhea  Associated symptoms: abdominal pain    Associated symptoms: no arthralgias, no fever, no myalgias and no vomiting        Prior to Admission Medications   Prescriptions Last Dose Informant Patient Reported? Taking?    albuterol (PROVENTIL HFA,VENTOLIN HFA) 90 mcg/act inhaler Past Week  Yes Yes   Sig: INHALE 2 PUFFS BY MOUTH EVERY FOUR HOURS HRLY AS NEEDED FOR BREATHING   cholecalciferol (VITAMIN D3) 1,000 units tablet 2023  Yes Yes   Sig: Take 1,000 Units by mouth daily at bedtime   cilostazol (PLETAL) 100 mg tablet 2023  Yes Yes   Sig: TAKE 100MG (1 TABLET) BY MOUTH TWICE A DAY   clopidogrel (PLAVIX) 75 mg tablet 2023  Yes Yes   Si mg   ferrous sulfate 325 (65 Fe) mg tablet 2023  Yes Yes   Sig: Take 325 mg by mouth daily at bedtime   losartan (COZAAR) 100 MG tablet 2023  Yes Yes   Sig: TAKE ONE-HALF TABLET BY MOUTH EVERY DAY FOR BLOOD PRESSURE/HEART   pravastatin (PRAVACHOL) 80 mg tablet 2023  Yes Yes   Sig: TAKE 40MG (ONE-HALF TABLET) BY MOUTH EVERY DAY AT 5 PM FOR CHOLESTEROL      Facility-Administered Medications: None       Past Medical History:   Diagnosis Date   • Anemia    • Arthritis    • Cancer [facility])     prostate   • COPD (chronic obstructive pulmonary disease) ([facility])    • History of transfusion 2005    auto transfusion   • Hyperlipidemia    • Hypertension    • Leaky heart valve    • Lung nodules     unsure of dx   • PVD (peripheral vascular disease) ([facility])    • Tobacco abuse    • UTI (urinary tract infection)        Past Surgical History:   Procedure Laterality Date   • COLONOSCOPY     • HERNIA REPAIR      with mesh   • [place] CYSTO CALIBRATION DILAT URTL STRIX/STENOSIS N/A 9/13/2022    Procedure: DILATATION URETHRAL, incision bladder neck contracture,cysto; Surgeon: Jennifer De MD;  [facility];  Service: Urology   • [place] TRANSURETHRAL RESECTION BLADDER NECK N/A 3/8/2022    Procedure: RESECTION TRANSURETHRAL BLADDER NECK CONTRACTURE;  Surgeon: Jennifer De MD;  [facility];  Service: Urology   • PROSTATECTOMY     • TONSILLECTOMY         History reviewed  No pertinent family history  I have reviewed and agree with the history as documented  E-Cigarette/Vaping   • E-Cigarette Use Never User      E-Cigarette/Vaping Substances   • Nicotine No    • THC No    • CBD No    • Flavoring No    • Other No    • Unknown No      Social History     Tobacco Use   • Smoking status: Every Day     Packs/day: 1 00     Types: Cigarettes   • Smokeless tobacco: Never   Vaping Use   • Vaping Use: Never used   Substance Use Topics   • Alcohol use: Yes     Alcohol/week: 6 0 standard drinks     Types: 6 Cans of beer per week   • Drug use: Never       Review of Systems   Constitutional: Negative for activity change, appetite change, fatigue and fever  HENT: Negative for congestion, dental problem, ear pain, rhinorrhea and sore throat  Eyes: Negative for pain and redness  Respiratory: Negative for chest tightness, shortness of breath and wheezing  Cardiovascular: Negative for chest pain and palpitations  Gastrointestinal: Positive for abdominal pain and diarrhea   Negative for anal bleeding, blood in stool, constipation, nausea and vomiting  Endocrine: Negative for cold intolerance and heat intolerance  Genitourinary: Negative for dysuria, frequency and hematuria  Musculoskeletal: Negative for arthralgias and myalgias  Skin: Negative for color change, pallor and rash  Neurological: Negative for weakness and numbness  Hematological: Does not bruise/bleed easily  Psychiatric/Behavioral: Negative for agitation, hallucinations and suicidal ideas  Physical Exam  Physical Exam  Vitals and nursing note reviewed  Constitutional:       Appearance: Normal appearance  Eyes:      General:         Right eye: No discharge  Left eye: No discharge  Extraocular Movements: Extraocular movements intact  Cardiovascular:      Rate and Rhythm: Normal rate and regular rhythm  Pulmonary:      Effort: Pulmonary effort is normal       Breath sounds: Normal breath sounds  Abdominal:      General: There is no distension  Palpations: There is no mass  Tenderness: There is abdominal tenderness  There is no right CVA tenderness, left CVA tenderness, guarding or rebound  Hernia: No hernia is present  Genitourinary:     Comments: Patient refuses rectal exam  Musculoskeletal:      Cervical back: Normal range of motion  No rigidity  Right lower leg: No edema  Left lower leg: No edema  Skin:     Capillary Refill: Capillary refill takes less than 2 seconds  Neurological:      General: No focal deficit present  Mental Status: He is alert and oriented to person, place, and time           Vital Signs  ED Triage Vitals   Temperature Pulse Respirations Blood Pressure SpO2   02/26/23 1057 02/26/23 1056 02/26/23 1056 02/26/23 1056 02/26/23 1056   97 7 °F (36 5 °C) 100 18 157/70 100 %      Temp Source Heart Rate Source Patient Position - Orthostatic VS BP Location FiO2 (%)   02/26/23 1057 02/26/23 1056 02/26/23 1056 02/26/23 1056 --   Oral Monitor Sitting Right arm       Pain Score       02/26/23 1056       5 Vitals:    02/26/23 1056 02/26/23 1345   BP: 157/70 143/65   Pulse: 100 76   Patient Position - Orthostatic VS: Sitting Lying         Visual Acuity      ED Medications  Medications   pantoprazole (PROTONIX) 80 mg in sodium chloride 0 9 % 100 mL infusion (8 mg/hr Intravenous New Bag 2/26/23 1346)   iohexol (OMNIPAQUE) 350 MG/ML injection (SINGLE-DOSE) 100 mL (100 mL Intravenous Given 2/26/23 1238)   pantoprazole (PROTONIX) injection 40 mg (40 mg Intravenous Given 2/26/23 1340)       Diagnostic Studies  Results Reviewed     Procedure Component Value Units Date/Time    Occult Bloood,Fecal Immunochemical [[id number]]     Lab Status: No result Specimen: Stool     Protime-INR [[id number]]  (Normal) Collected: 02/26/23 1136    Lab Status: Final result Specimen: Blood from Arm, Right Updated: 02/26/23 1201     Protime 13 7 seconds      INR 1 05    APTT [[id number]]  (Normal) Collected: 02/26/23 1136    Lab Status: Final result Specimen: Blood from Arm, Right Updated: 02/26/23 1201     PTT 32 seconds     Lactic acid [[id number]]  (Normal) Collected: 02/26/23 1116    Lab Status: Final result Specimen: Blood from Arm, Right Updated: 02/26/23 1139     LACTIC ACID 0 7 mmol/L     Narrative:      Result may be elevated if tourniquet was used during collection      Comprehensive metabolic panel [[id number]]  (Abnormal) Collected: 02/26/23 1116    Lab Status: Final result Specimen: Blood from Arm, Right Updated: 02/26/23 1139     Sodium 138 mmol/L      Potassium 3 5 mmol/L      Chloride 113 mmol/L      CO2 16 mmol/L      ANION GAP 9 mmol/L      BUN 21 mg/dL      Creatinine 1 34 mg/dL      Glucose 106 mg/dL      Calcium 8 9 mg/dL      AST 11 U/L      ALT 10 U/L      Alkaline Phosphatase 75 U/L      Total Protein 7 6 g/dL      Albumin 4 0 g/dL      Total Bilirubin 0 26 mg/dL      eGFR 52 ml/min/1 73sq m     Narrative:      [place] guidelines for Chronic Kidney Disease (CKD):   •  Stage 1 with normal or high GFR (GFR > 90 mL/min/1 73 square meters)  •  Stage 2 Mild CKD (GFR = 60-89 mL/min/1 73 square meters)  •  Stage 3A Moderate CKD (GFR = 45-59 mL/min/1 73 square meters)  •  Stage 3B Moderate CKD (GFR = 30-44 mL/min/1 73 square meters)  •  Stage 4 Severe CKD (GFR = 15-29 mL/min/1 73 square meters)  •  Stage 5 End Stage CKD (GFR <15 mL/min/1 73 square meters)  Note: GFR calculation is accurate only with a steady state creatinine    Lipase [[id number]]  (Normal) Collected: 02/26/23 1116    Lab Status: Final result Specimen: Blood from Arm, Right Updated: 02/26/23 1139     Lipase 11 u/L     CBC and differential [[id number]]  (Abnormal) Collected: 02/26/23 1116    Lab Status: Final result Specimen: Blood from Arm, Right Updated: 02/26/23 1125     WBC 10 90 Thousand/uL      RBC 2 97 Million/uL      Hemoglobin 8 8 g/dL      Hematocrit 28 2 %      MCV 95 fL      MCH 29 6 pg      MCHC 31 2 g/dL      RDW 14 9 %      MPV 9 1 fL      Platelets 328 Thousands/uL      nRBC 0 /100 WBCs      Neutrophils Relative 61 %      Immat GRANS % 1 %      Lymphocytes Relative 26 %      Monocytes Relative 9 %      Eosinophils Relative 2 %      Basophils Relative 1 %      Neutrophils Absolute 6 73 Thousands/µL      Immature Grans Absolute 0 05 Thousand/uL      Lymphocytes Absolute 2 88 Thousands/µL      Monocytes Absolute 0 94 Thousand/µL      Eosinophils Absolute 0 22 Thousand/µL      Basophils Absolute 0 08 Thousands/µL                  CT abdomen pelvis with contrast   Final Result by Chau Ruiz MD (02/26 1719)      Sigmoid diverticulosis with chronic diverticular disease  Coexisting acute sigmoid diverticulitis cannot be entirely excluded  Otherwise, no evidence of acute abnormality in the abdomen or pelvis              Workstation performed: [id number]                    Procedures  ECG 12 Lead Documentation Only    Date/Time: 2/26/2023 1:05 PM  Performed by: Gadiel Tuttle MD  Authorized by: Gadiel Tuttle MD ECG reviewed by me, the ED Provider: yes    Patient location:  ED  Rate:     ECG rate:  72    ECG rate assessment: normal    Rhythm:     Rhythm: sinus rhythm    Ectopy:     Ectopy: none    QRS:     QRS axis:  Normal    QRS intervals:  Normal  Conduction:     Conduction: normal    ST segments:     ST segments:  Normal  T waves:     T waves: normal      CriticalCare Time  Performed by: Shira Norton MD  Authorized by: Shira Norton MD     Critical care provider statement:     Critical care time (minutes):  35    Critical care time was exclusive of:  Separately billable procedures and treating other patients and teaching time    Critical care was necessary to treat or prevent imminent or life-threatening deterioration of the following conditions:  Cardiac failure and circulatory failure    Critical care was time spent personally by me on the following activities:  Blood draw for specimens, obtaining history from patient or surrogate, development of treatment plan with patient or surrogate, discussions with consultants, evaluation of patient's response to treatment, examination of patient, interpretation of cardiac output measurements, ordering and performing treatments and interventions, ordering and review of laboratory studies, ordering and review of radiographic studies, re-evaluation of patient's condition and review of old charts  Comments:      GI bleeding, iv protonix             ED Course  ED Course as of 02/26/23 1419   Sun Feb 26, 2023   1132 Hemoglobin(!): 8 8  New anemia, likely 2/2 gi bleed, will admit   1205 Creatinine(!): 1 34  stable                               SBIRT 22yo+    Flowsheet Row Most Recent Value   SBIRT (25 yo +)    In order to provide better care to our patients, we are screening all of our patients for alcohol and drug use  Would it be okay to ask you these screening questions?  No Filed at: 02/26/2023 1103                    Medical Decision Making  Intermittent abdominal pain, melena, diarrhea  Abdominal lab work was checked and patient was found to have new anemia  Patient did refuse rectal exam but is likely having a GI bleed  Patient was started on Protonix drip  CT scan was ordered to rule out acute surgical process such as colitis, diverticulosis, diverticulitis  Patient will be admitted to the hospital    Amount and/or Complexity of Data Reviewed  Labs: ordered  Decision-making details documented in ED Course  Radiology: ordered  Risk  Prescription drug management  Disposition  Final diagnoses:   Anemia   Acute abdominal pain   Diarrhea   Diverticulosis   Anticoagulated   Thrombocytosis   CKD (chronic kidney disease)     Time reflects when diagnosis was documented in both MDM as applicable and the Disposition within this note     Time User Action Codes Description Comment    2/26/2023  2:16 PM Kely Seals Add [D64 9] Anemia     2/26/2023  2:16 PM Leticia Parmar J Add [R10 9] Acute abdominal pain     2/26/2023  2:16 PM Kely Seals Add [R19 7] Diarrhea     2/26/2023  2:17 PM Kely Seals Add [K57 90] Diverticulosis     2/26/2023  2:17 PM Isi Hearing J Add [Z79 01] Anticoagulated     2/26/2023  2:18 PM Kely Seals Add [D75 839] Thrombocytosis     2/26/2023  2:18 PM Jimbo Parmar Add [N18 9] CKD (chronic kidney disease)       ED Disposition     ED Disposition   Admit    Condition   Stable    Date/Time   Sun Feb 26, 2023  1:55 PM    Comment   Case was discussed with Dr Maurilio Vick and the patient's admission status was agreed to be Admission Status: inpatient status to the service of Dr Maurilio Vick   Follow-up Information    None         Patient's Medications   Discharge Prescriptions    No medications on file       No discharge procedures on file      PDMP Review     None          ED Provider  Electronically Signed by           Gadiel Tuttle MD  02/26/23 8473

## 2023-02-27 ENCOUNTER — ANESTHESIA EVENT (OUTPATIENT)
Dept: GASTROENTEROLOGY | Facility: HOSPITAL | Age: 73
End: 2023-02-27

## 2023-02-27 ENCOUNTER — ANESTHESIA (OUTPATIENT)
Dept: GASTROENTEROLOGY | Facility: HOSPITAL | Age: 73
End: 2023-02-27

## 2023-02-27 ENCOUNTER — APPOINTMENT (OUTPATIENT)
Dept: GASTROENTEROLOGY | Facility: HOSPITAL | Age: 73
End: 2023-02-27

## 2023-02-27 LAB
ANION GAP SERPL CALCULATED.3IONS-SCNC: 7 MMOL/L (ref 4–13)
BUN SERPL-MCNC: 19 MG/DL (ref 5–25)
CALCIUM SERPL-MCNC: 8.3 MG/DL (ref 8.4–10.2)
CHLORIDE SERPL-SCNC: 116 MMOL/L (ref 96–108)
CO2 SERPL-SCNC: 16 MMOL/L (ref 21–32)
CREAT SERPL-MCNC: 1.3 MG/DL (ref 0.6–1.3)
ERYTHROCYTE [DISTWIDTH] IN BLOOD BY AUTOMATED COUNT: 14.6 % (ref 11.6–15.1)
GFR SERPL CREATININE-BSD FRML MDRD: 54 ML/MIN/1.73SQ M
GLUCOSE P FAST SERPL-MCNC: 75 MG/DL (ref 65–99)
GLUCOSE SERPL-MCNC: 75 MG/DL (ref 65–140)
HCT VFR BLD AUTO: 25.2 % (ref 36.5–49.3)
HGB BLD-MCNC: 7.9 G/DL (ref 12–17)
MAGNESIUM SERPL-MCNC: 2.1 MG/DL (ref 1.9–2.7)
MCH RBC QN AUTO: 29.5 PG (ref 26.8–34.3)
MCHC RBC AUTO-ENTMCNC: 31.3 G/DL (ref 31.4–37.4)
MCV RBC AUTO: 94 FL (ref 82–98)
PLATELET # BLD AUTO: 458 THOUSANDS/UL (ref 149–390)
PMV BLD AUTO: 9.2 FL (ref 8.9–12.7)
POTASSIUM SERPL-SCNC: 3.7 MMOL/L (ref 3.5–5.3)
RBC # BLD AUTO: 2.68 MILLION/UL (ref 3.88–5.62)
SODIUM SERPL-SCNC: 139 MMOL/L (ref 135–147)
WBC # BLD AUTO: 9.42 THOUSAND/UL (ref 4.31–10.16)

## 2023-02-27 PROCEDURE — 0DJ08ZZ INSPECTION OF UPPER INTESTINAL TRACT, VIA NATURAL OR ARTIFICIAL OPENING ENDOSCOPIC: ICD-10-PCS | Performed by: STUDENT IN AN ORGANIZED HEALTH CARE EDUCATION/TRAINING PROGRAM

## 2023-02-27 RX ORDER — PROPOFOL 10 MG/ML
INJECTION, EMULSION INTRAVENOUS AS NEEDED
Status: DISCONTINUED | OUTPATIENT
Start: 2023-02-27 | End: 2023-02-27

## 2023-02-27 RX ORDER — PANTOPRAZOLE SODIUM 40 MG/1
40 TABLET, DELAYED RELEASE ORAL
Status: DISCONTINUED | OUTPATIENT
Start: 2023-02-28 | End: 2023-03-01 | Stop reason: HOSPADM

## 2023-02-27 RX ORDER — SODIUM CHLORIDE 9 MG/ML
125 INJECTION, SOLUTION INTRAVENOUS CONTINUOUS
Status: DISCONTINUED | OUTPATIENT
Start: 2023-02-27 | End: 2023-02-27

## 2023-02-27 RX ADMIN — PROPOFOL 80 MG: 10 INJECTION, EMULSION INTRAVENOUS at 13:36

## 2023-02-27 RX ADMIN — SODIUM CHLORIDE 8 MG/HR: 9 INJECTION, SOLUTION INTRAVENOUS at 09:45

## 2023-02-27 RX ADMIN — SODIUM CHLORIDE 125 ML/HR: 0.9 INJECTION, SOLUTION INTRAVENOUS at 13:12

## 2023-02-27 RX ADMIN — PRAVASTATIN SODIUM 80 MG: 80 TABLET ORAL at 17:09

## 2023-02-27 RX ADMIN — POLYETHYLENE GLYCOL 3350, SODIUM SULFATE ANHYDROUS, SODIUM BICARBONATE, SODIUM CHLORIDE, POTASSIUM CHLORIDE 4000 ML: 236; 22.74; 6.74; 5.86; 2.97 POWDER, FOR SOLUTION ORAL at 17:09

## 2023-02-27 RX ADMIN — PROPOFOL 40 MG: 10 INJECTION, EMULSION INTRAVENOUS at 13:43

## 2023-02-27 RX ADMIN — LOSARTAN POTASSIUM 50 MG: 50 TABLET, FILM COATED ORAL at 08:35

## 2023-02-27 RX ADMIN — BISACODYL 10 MG: 5 TABLET, COATED ORAL at 17:09

## 2023-02-27 RX ADMIN — PROPOFOL 40 MG: 10 INJECTION, EMULSION INTRAVENOUS at 13:39

## 2023-02-27 NOTE — ASSESSMENT & PLAN NOTE
· Complains of dark black stools, for 2 weeks  · Baseline hemoglobin 12-13, hemoglobin 8 8, today 7 9  · He is on Plavix for peripheral artery disease  · Continue Protonix drip  · Hold Plavix  · N p o  for EGD today  · Monitor H&H

## 2023-02-27 NOTE — PLAN OF CARE
Problem: Potential for Falls  Goal: Patient will remain free of falls  Description: INTERVENTIONS:  - Educate patient/family on patient safety including physical limitations  - Instruct patient to call for assistance with activity   - Consult OT/PT to assist with strengthening/mobility   - Keep Call bell within reach  - Keep bed low and locked with side rails adjusted as appropriate  - Keep care items and personal belongings within reach  - Initiate and maintain comfort rounds  - Make Fall Risk Sign visible to staff  - Apply yellow socks and bracelet for high fall risk patients  - Consider moving patient to room near nurses station  2/27/2023 0919 by Geno Wright  Outcome: Progressing  2/27/2023 0919 by Geno Wright  Outcome: Progressing     Problem: CARDIOVASCULAR - ADULT  Goal: Maintains optimal cardiac output and hemodynamic stability  Description: INTERVENTIONS:  - Monitor I/O, vital signs and rhythm  - Monitor for S/S and trends of decreased cardiac output  - Administer and titrate ordered vasoactive medications to optimize hemodynamic stability  - Assess quality of pulses, skin color and temperature  - Assess for signs of decreased coronary artery perfusion  - Instruct patient to report change in severity of symptoms  2/27/2023 0919 by Geno Wright  Outcome: Progressing  2/27/2023 0919 by Geno Wright  Outcome: Progressing  Goal: Absence of cardiac dysrhythmias or at baseline rhythm  Description: INTERVENTIONS:  - Continuous cardiac monitoring, vital signs, obtain 12 lead EKG if ordered  - Administer antiarrhythmic and heart rate control medications as ordered  - Monitor electrolytes and administer replacement therapy as ordered  2/27/2023 0919 by Geno Wright  Outcome: Progressing  2/27/2023 0919 by Geno Wright  Outcome: Progressing     Problem: GASTROINTESTINAL - ADULT  Goal: Maintains or returns to baseline bowel function  Description: INTERVENTIONS:  - Assess bowel function  - Encourage oral fluids to ensure adequate hydration  - Administer IV fluids if ordered to ensure adequate hydration  - Administer ordered medications as needed  - Encourage mobilization and activity  - Consider nutritional services referral to assist patient with adequate nutrition and appropriate food choices  2/27/2023 0919 by Jim Grey  Outcome: Progressing  2/27/2023 0919 by Jim Grey  Outcome: Progressing     Problem: HEMATOLOGIC - ADULT  Goal: Maintains hematologic stability  Description: INTERVENTIONS  - Assess for signs and symptoms of bleeding or hemorrhage  - Monitor labs  - Administer supportive blood products/factors as ordered and appropriate  2/27/2023 0919 by Jim Grey  Outcome: Progressing  2/27/2023 0919 by Jim Grey  Outcome: Progressing     Problem: Nutrition/Hydration-ADULT  Goal: Nutrient/Hydration intake appropriate for improving, restoring or maintaining nutritional needs  Description: Monitor and assess patient's nutrition/hydration status for malnutrition  Collaborate with interdisciplinary team and initiate plan and interventions as ordered  Monitor patient's weight and dietary intake as ordered or per policy  Utilize nutrition screening tool and intervene as necessary  Determine patient's food preferences and provide high-protein, high-caloric foods as appropriate       INTERVENTIONS:  - Monitor oral intake, urinary output, labs, and treatment plans  - Assess nutrition and hydration status and recommend course of action  - Evaluate amount of meals eaten  - Assist patient with eating if necessary   - Allow adequate time for meals  - Recommend/ encourage appropriate diets, oral nutritional supplements, and vitamin/mineral supplements  - Order, calculate, and assess calorie counts as needed  - Recommend, monitor, and adjust tube feedings and TPN/PPN based on assessed needs  - Assess need for intravenous fluids  - Provide specific nutrition/hydration education as appropriate  - Include patient/family/caregiver in decisions related to nutrition  2/27/2023 0919 by Michelle Hanson  Outcome: Progressing  2/27/2023 0919 by Michelle Ennisague  Outcome: Progressing

## 2023-02-27 NOTE — PROGRESS NOTES
[facility]  Progress Note Samantha Brandt 1950, 68 y o  male MRN: [id number]  Unit/Bed#: E5 -01 Encounter: [id number]  Primary Care Provider: Nelida Hawkins MD   Date and time admitted to hospital: 2/26/2023 10:50 AM    * GI bleed  Assessment & Plan  · Complains of dark black stools, for 2 weeks  · Baseline hemoglobin 12-13, hemoglobin 8 8, today 7 9  · He is on Plavix for peripheral artery disease  · Continue Protonix drip  · Hold Plavix  · N p o  for EGD today  · Monitor H&H    Acute blood loss anemia  Assessment & Plan  · Suspect in the setting of upper GI bleed, patient had melena for 2 weeks  · For EGD today  · Transfuse if hemoglobin less than 7    Tobacco abuse  Assessment & Plan  · Nicotine patch    History of prostate cancer  Assessment & Plan  · History of prostate cancer, status post radical prostatectomy in 2005  · Recurrent bladder neck contracture, several procedures done for this    PVD (peripheral vascular disease) ([facility] [address] )  Assessment & Plan  · History of peripheral artery disease status post right SFA atherectomy with angioplasty in April 2021  · Hold Plavix and Pletal with GI bleed        VTE Pharmacologic Prophylaxis:   Moderate Risk (Score 3-4) - Pharmacological DVT Prophylaxis Contraindicated  Sequential Compression Devices Ordered  Patient Centered Rounds: I performed bedside rounds with nursing staff today  Discussions with Specialists or Other Care Team Provider: Nursing, gastroenterology, case management    Education and Discussions with Family / Patient: Patient declined call to        Total Time Spent on Date of Encounter in care of patient: 35 minutes This time was spent on one or more of the following: performing physical exam; counseling and coordination of care; obtaining or reviewing history; documenting in the medical record; reviewing/ordering tests, medications or procedures; communicating with other healthcare professionals and discussing with patient's family/caregivers  Current Length of Stay: 0 day(s)  Current Patient Status: Observation   Certification Statement: The patient will continue to require additional inpatient hospital stay due to GI bleed  Discharge Plan: Anticipate discharge in 48 hrs to home  Code Status: Level 1 - Full Code    Subjective:   Patient was seen evaluated bedside, feeling well denies chest pain palpitation shortness of breath, no abdominal pain    Objective:     Vitals:   Temp (24hrs), Av 9 °F (36 6 °C), Min:97 6 °F (36 4 °C), Max:98 2 °F (36 8 °C)    Temp:  [97 6 °F (36 4 °C)-98 2 °F (36 8 °C)] 98 2 °F (36 8 °C)  HR:  [76-81] 81  Resp:  [17-18] 17  BP: (112-159)/(59-85) 140/69  SpO2:  [94 %-99 %] 94 %  Body mass index is 23 7 kg/m²  Input and Output Summary (last 24 hours):   No intake or output data in the 24 hours ending 23 1155    Physical Exam:   Physical Exam  Constitutional:       General: He is not in acute distress  HENT:      Head: Atraumatic  Cardiovascular:      Rate and Rhythm: Normal rate and regular rhythm  Heart sounds: No murmur heard  No friction rub  No gallop  Pulmonary:      Effort: Pulmonary effort is normal  No respiratory distress  Breath sounds: Normal breath sounds  No wheezing  Abdominal:      General: Bowel sounds are normal  There is no distension  Palpations: Abdomen is soft  Tenderness: There is no abdominal tenderness  Musculoskeletal:         General: No swelling  Cervical back: Neck supple  Skin:     General: Skin is warm and dry  Neurological:      General: No focal deficit present  Mental Status: He is alert     Psychiatric:         Mood and Affect: Mood normal           Additional Data:     Labs:  Results from last 7 days   Lab Units 23  0441 23  1806 23  1116   WBC Thousand/uL 9 42  --  10 90*   HEMOGLOBIN g/dL 7 9*   < > 8 8*   HEMATOCRIT % 25 2*   < > 28 2*   PLATELETS Thousands/uL 458*  --  489*   NEUTROS PCT %  --   --  61   LYMPHS PCT %  --   --  26   MONOS PCT %  --   --  9   EOS PCT %  --   --  2    < > = values in this interval not displayed  Results from last 7 days   Lab Units 02/27/23  0441 02/26/23  1116   SODIUM mmol/L 139 138   POTASSIUM mmol/L 3 7 3 5   CHLORIDE mmol/L 116* 113*   CO2 mmol/L 16* 16*   BUN mg/dL 19 21   CREATININE mg/dL 1 30 1 34*   ANION GAP mmol/L 7 9   CALCIUM mg/dL 8 3* 8 9   ALBUMIN g/dL  --  4 0   TOTAL BILIRUBIN mg/dL  --  0 26   ALK PHOS U/L  --  75   ALT U/L  --  10   AST U/L  --  11*   GLUCOSE RANDOM mg/dL 75 106     Results from last 7 days   Lab Units 02/26/23  1136   INR  1 05             Results from last 7 days   Lab Units 02/26/23  1116   LACTIC ACID mmol/L 0 7       Lines/Drains:  Invasive Devices     Peripheral Intravenous Line  Duration           Peripheral IV 02/26/23 Right Antecubital 1 day                      Imaging: Reviewed radiology reports from this admission including: abdominal/pelvic CT    Recent Cultures (last 7 days):         Last 24 Hours Medication List:   Current Facility-Administered Medications   Medication Dose Route Frequency Provider Last Rate   • albuterol  2 puff Inhalation Q4H PRN Tyson Rios Prechtel, DO     • ferrous sulfate  325 mg Oral HS Johnson S Prechtel, DO     • losartan  50 mg Oral Daily Johnson S Prechtel, DO     • nicotine  1 patch Transdermal Daily Johnson S Prechtel, DO     • pantoprozole (PROTONIX) infusion (Continuous)  8 mg/hr Intravenous Continuous Latisha Ferrer MD 8 mg/hr (02/27/23 0923)   • pravastatin  80 mg Oral Daily With Dinner Chris Chahal DO          Today, Patient Was Seen By: Lara Parekh MD    **Please Note: This note may have been constructed using a voice recognition system  **

## 2023-02-27 NOTE — PLAN OF CARE
Problem: Potential for Falls  Goal: Patient will remain free of falls  Description: INTERVENTIONS:  - Educate patient/family on patient safety including physical limitations  - Instruct patient to call for assistance with activity   - Consult OT/PT to assist with strengthening/mobility   - Keep Call bell within reach  - Keep bed low and locked with side rails adjusted as appropriate  - Keep care items and personal belongings within reach  - Initiate and maintain comfort rounds  - Make Fall Risk Sign visible to staff  - Apply yellow socks and bracelet for high fall risk patients  - Consider moving patient to room near nurses station  2/26/2023 2001 by Naman Girard RN  Outcome: Progressing  2/26/2023 2000 by Naman Girard RN  Outcome: Progressing     Problem: CARDIOVASCULAR - ADULT  Goal: Maintains optimal cardiac output and hemodynamic stability  Description: INTERVENTIONS:  - Monitor I/O, vital signs and rhythm  - Monitor for S/S and trends of decreased cardiac output  - Administer and titrate ordered vasoactive medications to optimize hemodynamic stability  - Assess quality of pulses, skin color and temperature  - Assess for signs of decreased coronary artery perfusion  - Instruct patient to report change in severity of symptoms  2/26/2023 2001 by Naman Girard RN  Outcome: Progressing  2/26/2023 2000 by Naman Girard RN  Outcome: Progressing  Goal: Absence of cardiac dysrhythmias or at baseline rhythm  Description: INTERVENTIONS:  - Continuous cardiac monitoring, vital signs, obtain 12 lead EKG if ordered  - Administer antiarrhythmic and heart rate control medications as ordered  - Monitor electrolytes and administer replacement therapy as ordered  2/26/2023 2001 by Naman Girard RN  Outcome: Progressing  2/26/2023 2000 by Naman Girard RN  Outcome: Progressing     Problem: GASTROINTESTINAL - ADULT  Goal: Maintains or returns to baseline bowel function  Description: INTERVENTIONS:  - Assess bowel function  - Encourage oral fluids to ensure adequate hydration  - Administer IV fluids if ordered to ensure adequate hydration  - Administer ordered medications as needed  - Encourage mobilization and activity  - Consider nutritional services referral to assist patient with adequate nutrition and appropriate food choices  2/26/2023 2001 by Abrahan Cooper RN  Outcome: Progressing  2/26/2023 2000 by Abrahan Cooper RN  Outcome: Progressing     Problem: HEMATOLOGIC - ADULT  Goal: Maintains hematologic stability  Description: INTERVENTIONS  - Assess for signs and symptoms of bleeding or hemorrhage  - Monitor labs  - Administer supportive blood products/factors as ordered and appropriate  2/26/2023 2001 by Abrahan Cooper RN  Outcome: Progressing  2/26/2023 2000 by Abrahan Cooper RN  Outcome: Progressing     Problem: Nutrition/Hydration-ADULT  Goal: Nutrient/Hydration intake appropriate for improving, restoring or maintaining nutritional needs  Description: Monitor and assess patient's nutrition/hydration status for malnutrition  Collaborate with interdisciplinary team and initiate plan and interventions as ordered  Monitor patient's weight and dietary intake as ordered or per policy  Utilize nutrition screening tool and intervene as necessary  Determine patient's food preferences and provide high-protein, high-caloric foods as appropriate       INTERVENTIONS:  - Monitor oral intake, urinary output, labs, and treatment plans  - Assess nutrition and hydration status and recommend course of action  - Evaluate amount of meals eaten  - Assist patient with eating if necessary   - Allow adequate time for meals  - Recommend/ encourage appropriate diets, oral nutritional supplements, and vitamin/mineral supplements  - Order, calculate, and assess calorie counts as needed  - Recommend, monitor, and adjust tube feedings and TPN/PPN based on assessed needs  - Assess need for intravenous fluids  - Provide specific nutrition/hydration education as appropriate  - Include patient/family/caregiver in decisions related to nutrition  2/26/2023 2001 by Kerline Bradley RN  Outcome: Progressing  2/26/2023 2000 by Kerline Bradley, RN  Outcome: Progressing

## 2023-02-27 NOTE — ASSESSMENT & PLAN NOTE
· History of prostate cancer, status post radical prostatectomy in 2005  · Recurrent bladder neck contracture, several procedures done for this

## 2023-02-27 NOTE — ANESTHESIA POSTPROCEDURE EVALUATION
Post-Op Assessment Note    CV Status:  Stable  Pain Score: 0    Pain management: adequate     Mental Status:  Alert and awake   Hydration Status:  Euvolemic and stable   PONV Controlled:  Controlled   Airway Patency:  Patent   Two or more mitigation strategies used for obstructive sleep apnea   Post Op Vitals Reviewed: Yes      Staff: Anesthesiologist         No notable events documented      BP      Temp      Pulse     Resp      SpO2      /67   Pulse 77   Temp 98 5 °F (36 9 °C) (Temporal)   Resp 20   Wt 66 6 kg (146 lb 13 2 oz)   SpO2 97%   BMI 23 70 kg/m²

## 2023-02-27 NOTE — CONSULTS
Consultation - [address] [facility]  Pako Lacy 68 y o  male MRN: [id number]  Unit/Bed#: E5 -01 Encounter: [id number]        Inpatient consult to gastroenterology  Consult performed by: Yosi Medina PA-C  Consult ordered by: Zakia Bess DO          Reason for Consult / Principal Problem:     1  GI bleed    ASSESSMENT AND PLAN:      68year old male with HTN, CKD, BPH, PVD on Plavix and Pletal, and a history of prostate cancer s/p prostatectomy in 3222 complicated by recent bladder neck contracture s/p dilation presenting with two weeks of lower abdominal cramping and dark tarry diarrhea  1  Melena  2  Anemia   3  Lower abdominal pain   The patient presented with 2 weeks of lower abdominal pain/cramping followed by a black tarry bowel movement and then resolution of his pain  CT with sigmoid diverticulosis with chronic diverticular disease  Baseline HGB is 12 7-13 7 and dropping from 8 8 to 7 9 since admit  No NSAID use  Has been started on PPI and now having brown bowel movements  No recent EGD  Last colonoscopy 2019 with 3 polyps removed, moderate to severe diverticulosis in the sigmoid colon making passage of the colonoscope slightly difficult, and small-size internal hemorrhoids  - trend H&H and transfuse for a HGB >7  - monitor stools for overt bleeding  - continue PPI  - avoid NSAIDs  - hold plavix and pletal   - NPO for EGD today, depending on results may need to consider colonoscopy     ______________________________________________________________________    HPI: 68year old male with HTN, CKD, BPH, PVD on Plavix and Pletal (last taken the morning of 2/26), and a history of prostate cancer s/p prostatectomy in 4043 complicated by recent bladder neck contracture s/p dilation presenting with two weeks of lower abdominal cramping and dark tarry diarrhea  The patient states he will get lower abdominal pain, have a bowel movement, and the pain will resolve   CT on admit with sigmoid diverticulosis with chronic diverticular disease  Baseline HGB is 12 7-13 7 and dropping from 8 8 to 7 9 since admit  He states his most recent bowel movement was brown, no longer black  He has been started on PPI  No recent EGD  Last colonoscopy 2019 with 3 polyps removed, moderate to severe diverticulosis in the sigmoid colon making passage of the colonoscope slightly difficult, and small-size internal hemorrhoids  REVIEW OF SYSTEMS:    CONSTITUTIONAL: Denies any fever, chills, rigors, and weight loss  HEENT: No earache or tinnitus  Denies hearing loss or visual disturbances  CARDIOVASCULAR: No chest pain or palpitations  RESPIRATORY: Denies any cough, hemoptysis, shortness of breath or dyspnea on exertion  GASTROINTESTINAL: As noted in the History of Present Illness  GENITOURINARY: No problems with urination  Denies any hematuria or dysuria  NEUROLOGIC: No dizziness or vertigo, denies headaches  MUSCULOSKELETAL: Denies any muscle or joint pain  SKIN: Denies skin rashes or itching  ENDOCRINE: Denies excessive thirst  Denies intolerance to heat or cold  PSYCHOSOCIAL: Denies depression or anxiety  Denies any recent memory loss  Historical Information   Past Medical History:   Diagnosis Date   • Anemia    • Arthritis    • Cancer [facility])     prostate   • COPD (chronic obstructive pulmonary disease) ([facility] [address] )    • History of transfusion 2005    auto transfusion   • Hyperlipidemia    • Hypertension    • Leaky heart valve    • Lung nodules     unsure of dx   • PVD (peripheral vascular disease) (HCC)    • Tobacco abuse    • UTI (urinary tract infection)      Past Surgical History:   Procedure Laterality Date   • COLONOSCOPY     • HERNIA REPAIR      with mesh   • MO CYSTO CALIBRATION DILAT URTL STRIX/STENOSIS N/A 9/13/2022    Procedure: DILATATION URETHRAL, incision bladder neck contracture,cysto;   Surgeon: Beronica Wong MD;  Location: [facility];  Service: Urology   • MO TRANSURETHRAL RESECTION BLADDER NECK N/A 3/8/2022    Procedure: RESECTION TRANSURETHRAL BLADDER NECK CONTRACTURE;  Surgeon: Nancy Blue MD;  Location: [facility];  Service: Urology   • PROSTATECTOMY     • TONSILLECTOMY       Social History   Social History     Substance and Sexual Activity   Alcohol Use Yes   • Alcohol/week: 6 0 standard drinks   • Types: 6 Cans of beer per week     Social History     Substance and Sexual Activity   Drug Use Never     Social History     Tobacco Use   Smoking Status Every Day   • Packs/day: 1 00   • Types: Cigarettes   Smokeless Tobacco Never     History reviewed  No pertinent family history  Meds/Allergies     Medications Prior to Admission   Medication   • albuterol (PROVENTIL HFA,VENTOLIN HFA) 90 mcg/act inhaler   • cholecalciferol (VITAMIN D3) 1,000 units tablet   • cilostazol (PLETAL) 100 mg tablet   • clopidogrel (PLAVIX) 75 mg tablet   • ferrous sulfate 325 (65 Fe) mg tablet   • losartan (COZAAR) 100 MG tablet   • pravastatin (PRAVACHOL) 80 mg tablet     Current Facility-Administered Medications   Medication Dose Route Frequency   • albuterol (PROVENTIL HFA,VENTOLIN HFA) inhaler 2 puff  2 puff Inhalation Q4H PRN   • ferrous sulfate tablet 325 mg  325 mg Oral HS   • losartan (COZAAR) tablet 50 mg  50 mg Oral Daily   • nicotine (NICODERM CQ) 21 mg/24 hr TD 24 hr patch 1 patch  1 patch Transdermal Daily   • pantoprazole (PROTONIX) 80 mg in sodium chloride 0 9 % 100 mL infusion  8 mg/hr Intravenous Continuous   • pravastatin (PRAVACHOL) tablet 80 mg  80 mg Oral Daily With Dinner       Allergies   Allergen Reactions   • Aspirin Other (See Comments)     Polyps in sinuses    • Nitrofurantoin GI Intolerance     Stomach cramping    • Sm Non-Asprin Sinus [Pseudoephedrine-Acetaminophen] Other (See Comments)     Patient advised to avoid due to polyps       • Ciprofloxacin Tachycardia   • Penicillins Rash   • Rosuvastatin Myalgia   • Sulfa Antibiotics Rash           Objective     Blood pressure 140/69, pulse 81, temperature 98 2 °F (36 8 °C), temperature source Oral, resp  rate 17, weight 66 6 kg (146 lb 13 2 oz), SpO2 94 %  Body mass index is 23 7 kg/m²  No intake or output data in the 24 hours ending 02/27/23 7368      PHYSICAL EXAM:      General Appearance:   Alert, cooperative, no distress   HEENT:   Normocephalic, atraumatic, anicteric      Neck:  Supple, symmetrical, trachea midline   Lungs:   Clear to auscultation bilaterally; no rales, rhonchi or wheezing; respirations unlabored    Heart:   Regular rate and rhythm; no murmur, rub, or gallop     Abdomen:   Soft, non-tender, non-distended; normal bowel sounds; no masses, no organomegaly    Genitalia:   Deferred    Rectal:   Deferred    Extremities:  No cyanosis, clubbing or edema    Skin:  No jaundice, rashes, or lesions      Lab Results:   Admission on 02/26/2023   Component Date Value   • WBC 02/26/2023 10 90 (H)    • RBC 02/26/2023 2 97 (L)    • Hemoglobin 02/26/2023 8 8 (L)    • Hematocrit 02/26/2023 28 2 (L)    • MCV 02/26/2023 95    • MCH 02/26/2023 29 6    • MCHC 02/26/2023 31 2 (L)    • RDW 02/26/2023 14 9    • MPV 02/26/2023 9 1    • Platelets 32/10/7281 489 (H)    • nRBC 02/26/2023 0    • Neutrophils Relative 02/26/2023 61    • Immat GRANS % 02/26/2023 1    • Lymphocytes Relative 02/26/2023 26    • Monocytes Relative 02/26/2023 9    • Eosinophils Relative 02/26/2023 2    • Basophils Relative 02/26/2023 1    • Neutrophils Absolute 02/26/2023 6 73    • Immature Grans Absolute 02/26/2023 0 05    • Lymphocytes Absolute 02/26/2023 2 88    • Monocytes Absolute 02/26/2023 0 94    • Eosinophils Absolute 02/26/2023 0 22    • Basophils Absolute 02/26/2023 0 08    • Sodium 02/26/2023 138    • Potassium 02/26/2023 3 5    • Chloride 02/26/2023 113 (H)    • CO2 02/26/2023 16 (L)    • ANION GAP 02/26/2023 9    • BUN 02/26/2023 21    • Creatinine 02/26/2023 1 34 (H)    • Glucose 02/26/2023 106    • Calcium 02/26/2023 8 9    • AST 02/26/2023 11 (L)    • ALT 02/26/2023 10    • Alkaline Phosphatase 02/26/2023 75    • Total Protein 02/26/2023 7 6    • Albumin 02/26/2023 4 0    • Total Bilirubin 02/26/2023 0 26    • eGFR 02/26/2023 52    • Lipase 02/26/2023 11    • LACTIC ACID 02/26/2023 0 7    • Ventricular Rate 02/26/2023 77    • Atrial Rate 02/26/2023 86    • HI Interval 02/26/2023 146    • QRSD Interval 02/26/2023 78    • QT Interval 02/26/2023 384    • QTC Interval 02/26/2023 434    • P Axis 02/26/2023 47    • QRS Axis 02/26/2023 37    • T Wave Axis 02/26/2023 23    • Protime 02/26/2023 13 7    • INR 02/26/2023 1 05    • PTT 02/26/2023 32    • ABO Grouping 02/26/2023 A    • Rh Factor 02/26/2023 Positive    • Antibody Screen 02/26/2023 Negative    • Specimen Expiration Date 02/26/2023 [id number]    • ABO Grouping 02/26/2023 A    • Rh Factor 02/26/2023 Positive    • Hemoglobin 02/26/2023 8 0 (L)    • Hematocrit 02/26/2023 25 4 (L)    • WBC 02/27/2023 9 42    • RBC 02/27/2023 2 68 (L)    • Hemoglobin 02/27/2023 7 9 (L)    • Hematocrit 02/27/2023 25 2 (L)    • MCV 02/27/2023 94    • MCH 02/27/2023 29 5    • MCHC 02/27/2023 31 3 (L)    • RDW 02/27/2023 14 6    • Platelets 95/98/3808 458 (H)    • MPV 02/27/2023 9 2    • Sodium 02/27/2023 139    • Potassium 02/27/2023 3 7    • Chloride 02/27/2023 116 (H)    • CO2 02/27/2023 16 (L)    • ANION GAP 02/27/2023 7    • BUN 02/27/2023 19    • Creatinine 02/27/2023 1 30    • Glucose 02/27/2023 75    • Glucose, Fasting 02/27/2023 75    • Calcium 02/27/2023 8 3 (L)    • eGFR 02/27/2023 54    • Magnesium 02/27/2023 2 1        Imaging Studies: I have personally reviewed pertinent imaging studies

## 2023-02-27 NOTE — ANESTHESIA PREPROCEDURE EVALUATION
Procedure:  EGD    Relevant Problems   CARDIO   (+) Hypertension   (+) Moderate aortic valve stenosis   (+) PVD (peripheral vascular disease) (HCC)   (+) Shortness of breath on exertion      GI/HEPATIC   (+) GI bleed      /RENAL   (+) Acute kidney failure (HCC)   (+) BPH with urinary obstruction   (+) Stage 3 chronic kidney disease (HCC)      HEMATOLOGY   (+) Acute blood loss anemia      NEURO/PSYCH   (+) History of prostate cancer      PULMONARY   (+) Shortness of breath on exertion      Other   (+) Tobacco abuse        Physical Exam    Airway    Mallampati score: III  TM Distance: >3 FB  Neck ROM: full     Dental       Cardiovascular  Rhythm: regular, Rate: normal, Cardiovascular exam normal    Pulmonary  Pulmonary exam normal Breath sounds clear to auscultation,     Other Findings        Anesthesia Plan  ASA Score- 4     Anesthesia Type- general with ASA Monitors  Additional Monitors:   Airway Plan:           Plan Factors-Exercise tolerance (METS): <4 METS  Chart reviewed  Existing labs reviewed  Patient is a current smoker  Obstructive sleep apnea risk education given perioperatively  Induction- intravenous  Postoperative Plan-     Informed Consent- Anesthetic plan and risks discussed with patient

## 2023-02-27 NOTE — ASSESSMENT & PLAN NOTE
· History of peripheral artery disease status post right SFA atherectomy with angioplasty in April 2021  · Hold Plavix and Pletal with GI bleed

## 2023-02-27 NOTE — ASSESSMENT & PLAN NOTE
· Suspect in the setting of upper GI bleed, patient had melena for 2 weeks  · For EGD today  · Transfuse if hemoglobin less than 7

## 2023-02-28 ENCOUNTER — ANESTHESIA (INPATIENT)
Dept: GASTROENTEROLOGY | Facility: HOSPITAL | Age: 73
End: 2023-02-28

## 2023-02-28 ENCOUNTER — ANESTHESIA EVENT (INPATIENT)
Dept: GASTROENTEROLOGY | Facility: HOSPITAL | Age: 73
End: 2023-02-28

## 2023-02-28 ENCOUNTER — APPOINTMENT (INPATIENT)
Dept: GASTROENTEROLOGY | Facility: HOSPITAL | Age: 73
End: 2023-02-28

## 2023-02-28 LAB
ANION GAP SERPL CALCULATED.3IONS-SCNC: 9 MMOL/L (ref 4–13)
BASOPHILS # BLD AUTO: 0.06 THOUSANDS/ÂΜL (ref 0–0.1)
BASOPHILS NFR BLD AUTO: 1 % (ref 0–1)
BUN SERPL-MCNC: 20 MG/DL (ref 5–25)
CALCIUM SERPL-MCNC: 8.4 MG/DL (ref 8.4–10.2)
CHLORIDE SERPL-SCNC: 116 MMOL/L (ref 96–108)
CO2 SERPL-SCNC: 15 MMOL/L (ref 21–32)
CREAT SERPL-MCNC: 1.28 MG/DL (ref 0.6–1.3)
EOSINOPHIL # BLD AUTO: 0.2 THOUSAND/ÂΜL (ref 0–0.61)
EOSINOPHIL NFR BLD AUTO: 2 % (ref 0–6)
ERYTHROCYTE [DISTWIDTH] IN BLOOD BY AUTOMATED COUNT: 14.4 % (ref 11.6–15.1)
GFR SERPL CREATININE-BSD FRML MDRD: 55 ML/MIN/1.73SQ M
GLUCOSE SERPL-MCNC: 81 MG/DL (ref 65–140)
HCT VFR BLD AUTO: 26.4 % (ref 36.5–49.3)
HGB BLD-MCNC: 8.6 G/DL (ref 12–17)
IMM GRANULOCYTES # BLD AUTO: 0.04 THOUSAND/UL (ref 0–0.2)
IMM GRANULOCYTES NFR BLD AUTO: 1 % (ref 0–2)
LYMPHOCYTES # BLD AUTO: 2.46 THOUSANDS/ÂΜL (ref 0.6–4.47)
LYMPHOCYTES NFR BLD AUTO: 29 % (ref 14–44)
MCH RBC QN AUTO: 30 PG (ref 26.8–34.3)
MCHC RBC AUTO-ENTMCNC: 32.6 G/DL (ref 31.4–37.4)
MCV RBC AUTO: 92 FL (ref 82–98)
MONOCYTES # BLD AUTO: 0.76 THOUSAND/ÂΜL (ref 0.17–1.22)
MONOCYTES NFR BLD AUTO: 9 % (ref 4–12)
NEUTROPHILS # BLD AUTO: 4.84 THOUSANDS/ÂΜL (ref 1.85–7.62)
NEUTS SEG NFR BLD AUTO: 58 % (ref 43–75)
NRBC BLD AUTO-RTO: 0 /100 WBCS
PLATELET # BLD AUTO: 458 THOUSANDS/UL (ref 149–390)
PMV BLD AUTO: 9.1 FL (ref 8.9–12.7)
POTASSIUM SERPL-SCNC: 3.6 MMOL/L (ref 3.5–5.3)
RBC # BLD AUTO: 2.87 MILLION/UL (ref 3.88–5.62)
SODIUM SERPL-SCNC: 140 MMOL/L (ref 135–147)
WBC # BLD AUTO: 8.36 THOUSAND/UL (ref 4.31–10.16)

## 2023-02-28 PROCEDURE — 0DBN8ZX EXCISION OF SIGMOID COLON, VIA NATURAL OR ARTIFICIAL OPENING ENDOSCOPIC, DIAGNOSTIC: ICD-10-PCS | Performed by: STUDENT IN AN ORGANIZED HEALTH CARE EDUCATION/TRAINING PROGRAM

## 2023-02-28 RX ORDER — ONDANSETRON 2 MG/ML
4 INJECTION INTRAMUSCULAR; INTRAVENOUS EVERY 6 HOURS PRN
Status: DISCONTINUED | OUTPATIENT
Start: 2023-02-28 | End: 2023-03-01 | Stop reason: HOSPADM

## 2023-02-28 RX ORDER — SIMETHICONE 20 MG/.3ML
EMULSION ORAL AS NEEDED
Status: COMPLETED | OUTPATIENT
Start: 2023-02-28 | End: 2023-02-28

## 2023-02-28 RX ORDER — LIDOCAINE HYDROCHLORIDE 20 MG/ML
INJECTION, SOLUTION EPIDURAL; INFILTRATION; INTRACAUDAL; PERINEURAL AS NEEDED
Status: DISCONTINUED | OUTPATIENT
Start: 2023-02-28 | End: 2023-02-28

## 2023-02-28 RX ORDER — PROPOFOL 10 MG/ML
INJECTION, EMULSION INTRAVENOUS AS NEEDED
Status: DISCONTINUED | OUTPATIENT
Start: 2023-02-28 | End: 2023-02-28

## 2023-02-28 RX ADMIN — PANTOPRAZOLE SODIUM 40 MG: 40 TABLET, DELAYED RELEASE ORAL at 05:22

## 2023-02-28 RX ADMIN — PROPOFOL 30 MG: 10 INJECTION, EMULSION INTRAVENOUS at 14:10

## 2023-02-28 RX ADMIN — PROPOFOL 40 MG: 10 INJECTION, EMULSION INTRAVENOUS at 14:45

## 2023-02-28 RX ADMIN — ONDANSETRON 4 MG: 2 INJECTION INTRAMUSCULAR; INTRAVENOUS at 06:28

## 2023-02-28 RX ADMIN — LIDOCAINE HYDROCHLORIDE 40 MG: 20 INJECTION, SOLUTION EPIDURAL; INFILTRATION; INTRACAUDAL; PERINEURAL at 14:06

## 2023-02-28 RX ADMIN — PROPOFOL 40 MG: 10 INJECTION, EMULSION INTRAVENOUS at 14:41

## 2023-02-28 RX ADMIN — PROPOFOL 40 MG: 10 INJECTION, EMULSION INTRAVENOUS at 14:27

## 2023-02-28 RX ADMIN — PROPOFOL 40 MG: 10 INJECTION, EMULSION INTRAVENOUS at 14:35

## 2023-02-28 RX ADMIN — PROPOFOL 40 MG: 10 INJECTION, EMULSION INTRAVENOUS at 14:22

## 2023-02-28 RX ADMIN — PROPOFOL 70 MG: 10 INJECTION, EMULSION INTRAVENOUS at 14:06

## 2023-02-28 RX ADMIN — PROPOFOL 40 MG: 10 INJECTION, EMULSION INTRAVENOUS at 14:17

## 2023-02-28 RX ADMIN — PROPOFOL 40 MG: 10 INJECTION, EMULSION INTRAVENOUS at 14:13

## 2023-02-28 RX ADMIN — Medication 325 MG: at 21:20

## 2023-02-28 RX ADMIN — LOSARTAN POTASSIUM 50 MG: 50 TABLET, FILM COATED ORAL at 18:10

## 2023-02-28 RX ADMIN — PROPOFOL 40 MG: 10 INJECTION, EMULSION INTRAVENOUS at 14:30

## 2023-02-28 RX ADMIN — Medication 40 MG: at 14:28

## 2023-02-28 RX ADMIN — PRAVASTATIN SODIUM 80 MG: 80 TABLET ORAL at 15:30

## 2023-02-28 NOTE — ASSESSMENT & PLAN NOTE
· Suspect in the setting of GI bleed, patient had melena for 2 weeks  · EGD and colonoscopy without source of bleeding  · Outpatient follow-up with gastroenterology and will consider capsule endoscopy  · Hemoglobin remained stable in the range of 8

## 2023-02-28 NOTE — ANESTHESIA PREPROCEDURE EVALUATION
Procedure:  COLONOSCOPY    Relevant Problems   CARDIO   (+) Hypertension   (+) Moderate aortic valve stenosis   (+) Shortness of breath on exertion      GI/HEPATIC   (+) GI bleed      /RENAL   (+) Acute kidney failure (HCC)   (+) BPH with urinary obstruction   (+) Stage 3 chronic kidney disease (HCC)      HEMATOLOGY   (+) Acute blood loss anemia      NEURO/PSYCH   (+) History of prostate cancer      PULMONARY   (+) Shortness of breath on exertion        Physical Exam    Airway    Mallampati score: III  TM Distance: >3 FB  Neck ROM: full     Dental       Cardiovascular  Rhythm: regular, Rate: normal, Cardiovascular exam normal    Pulmonary  Pulmonary exam normal Breath sounds clear to auscultation,     Other Findings        Anesthesia Plan  ASA Score- 3     Anesthesia Type- IV sedation with anesthesia with ASA Monitors  Additional Monitors:   Airway Plan:           Plan Factors-Exercise tolerance (METS): <4 METS  Chart reviewed  Existing labs reviewed  Patient is a current smoker  Obstructive sleep apnea risk education given perioperatively  Induction- intravenous  Postoperative Plan-     Informed Consent- Anesthetic plan and risks discussed with patient

## 2023-02-28 NOTE — PLAN OF CARE
Problem: Potential for Falls  Goal: Patient will remain free of falls  Description: INTERVENTIONS:  - Educate patient/family on patient safety including physical limitations  - Instruct patient to call for assistance with activity   - Consult OT/PT to assist with strengthening/mobility   - Keep Call bell within reach  - Keep bed low and locked with side rails adjusted as appropriate  - Keep care items and personal belongings within reach  - Initiate and maintain comfort rounds  - Make Fall Risk Sign visible to staff  - Apply yellow socks and bracelet for high fall risk patients  - Consider moving patient to room near nurses station  Outcome: Progressing     Problem: CARDIOVASCULAR - ADULT  Goal: Maintains optimal cardiac output and hemodynamic stability  Description: INTERVENTIONS:  - Monitor I/O, vital signs and rhythm  - Monitor for S/S and trends of decreased cardiac output  - Administer and titrate ordered vasoactive medications to optimize hemodynamic stability  - Assess quality of pulses, skin color and temperature  - Assess for signs of decreased coronary artery perfusion  - Instruct patient to report change in severity of symptoms  Outcome: Progressing  Goal: Absence of cardiac dysrhythmias or at baseline rhythm  Description: INTERVENTIONS:  - Continuous cardiac monitoring, vital signs, obtain 12 lead EKG if ordered  - Administer antiarrhythmic and heart rate control medications as ordered  - Monitor electrolytes and administer replacement therapy as ordered  Outcome: Progressing     Problem: GASTROINTESTINAL - ADULT  Goal: Maintains or returns to baseline bowel function  Description: INTERVENTIONS:  - Assess bowel function  - Encourage oral fluids to ensure adequate hydration  - Administer IV fluids if ordered to ensure adequate hydration  - Administer ordered medications as needed  - Encourage mobilization and activity  - Consider nutritional services referral to assist patient with adequate nutrition and appropriate food choices  Outcome: Progressing     Problem: HEMATOLOGIC - ADULT  Goal: Maintains hematologic stability  Description: INTERVENTIONS  - Assess for signs and symptoms of bleeding or hemorrhage  - Monitor labs  - Administer supportive blood products/factors as ordered and appropriate  Outcome: Progressing     Problem: Nutrition/Hydration-ADULT  Goal: Nutrient/Hydration intake appropriate for improving, restoring or maintaining nutritional needs  Description: Monitor and assess patient's nutrition/hydration status for malnutrition  Collaborate with interdisciplinary team and initiate plan and interventions as ordered  Monitor patient's weight and dietary intake as ordered or per policy  Utilize nutrition screening tool and intervene as necessary  Determine patient's food preferences and provide high-protein, high-caloric foods as appropriate       INTERVENTIONS:  - Monitor oral intake, urinary output, labs, and treatment plans  - Assess nutrition and hydration status and recommend course of action  - Evaluate amount of meals eaten  - Assist patient with eating if necessary   - Allow adequate time for meals  - Recommend/ encourage appropriate diets, oral nutritional supplements, and vitamin/mineral supplements  - Order, calculate, and assess calorie counts as needed  - Recommend, monitor, and adjust tube feedings and TPN/PPN based on assessed needs  - Assess need for intravenous fluids  - Provide specific nutrition/hydration education as appropriate  - Include patient/family/caregiver in decisions related to nutrition  Outcome: Progressing

## 2023-02-28 NOTE — ASSESSMENT & PLAN NOTE
· Complains of dark black stools, for 2 weeks  · Baseline hemoglobin 12-13, globin on admission 8 8  · He is on Plavix and Pletal for peripheral artery disease  · EGD showed duodenitis, mildly edematous gastric mucosa, sliding hiatal hernia, no etiology of melena  · Colonoscopy showed sigmoid diverticulosis associated with moderate narrowing and erythematous and edematous mucosa concerning for diverticulitis or SCAD  Small internal/external hemorrhoids  No evidence of bleeding  · Continue Protonix 40 mg daily  · Check H  pylori stool antigen  · Discussed with gastroenterology, no need to treat colitis at this point, his pain is improving and is uncomplicated on colonoscopy    If pain worsens will consider antibiotic treatment  · Can resume Plavix and Pletal  · Resume diet  · Outpatient follow-up with gastroenterology, will consider capsule endoscopy  · Hemoglobin remained stable in the range of 8

## 2023-02-28 NOTE — ASSESSMENT & PLAN NOTE
· History of prostate cancer, status post radical prostatectomy in 2005  · Recurrent bladder neck contracture, several procedures done for this [Post Op: _________] : a [unfilled] post op visit [Family Member] : family member

## 2023-02-28 NOTE — ASSESSMENT & PLAN NOTE
· History of peripheral artery disease status post right SFA atherectomy with angioplasty in April 2021  · Restart Plavix and Pletal per gastroenterology

## 2023-02-28 NOTE — PROGRESS NOTES
[address]  Progress Note Samantha Brandt 1950, 68 y o  male MRN: [id number]  Unit/Bed#: E5 -01 Encounter: [id number]  Primary Care Provider: Nelida Hawkins MD   Date and time admitted to hospital: 2/26/2023 10:50 AM    * GI bleed  Assessment & Plan  · Complains of dark black stools, for 2 weeks  · Baseline hemoglobin 12-13, globin on admission 8 8  · He is on Plavix and Pletal for peripheral artery disease  · EGD showed duodenitis, mildly edematous gastric mucosa, sliding hiatal hernia, no etiology of melena  · Colonoscopy showed sigmoid diverticulosis associated with moderate narrowing and erythematous and edematous mucosa concerning for diverticulitis or SCAD  Small internal/external hemorrhoids  No evidence of bleeding  · Continue Protonix 40 mg daily  · Check H  pylori stool antigen  · Discussed with gastroenterology, no need to treat colitis at this point, his pain is improving and is uncomplicated on colonoscopy    If pain worsens will consider antibiotic treatment  · Can resume Plavix and Pletal  · Resume diet  · Outpatient follow-up with gastroenterology, will consider capsule endoscopy  · Hemoglobin remained stable in the range of 8    Acute blood loss anemia  Assessment & Plan  · Suspect in the setting of GI bleed, patient had melena for 2 weeks  · EGD and colonoscopy without source of bleeding  · Outpatient follow-up with gastroenterology and will consider capsule endoscopy  · Hemoglobin remained stable in the range of 8    Tobacco abuse  Assessment & Plan  · Nicotine patch    History of prostate cancer  Assessment & Plan  · History of prostate cancer, status post radical prostatectomy in 2005  · Recurrent bladder neck contracture, several procedures done for this    PVD (peripheral vascular disease) ([facility] [address] )  Assessment & Plan  · History of peripheral artery disease status post right SFA atherectomy with angioplasty in April 2021  · Restart Plavix and Pletal per gastroenterology        VTE Pharmacologic Prophylaxis:   Moderate Risk (Score 3-4) - Pharmacological DVT Prophylaxis Contraindicated  Sequential Compression Devices Ordered  Patient Centered Rounds: I performed bedside rounds with nursing staff today  Discussions with Specialists or Other Care Team Provider: Nursing, case management, gastroenterology    Education and Discussions with Family / Patient: Updated  (daughter) at bedside  Total Time Spent on Date of Encounter in care of patient: 35 minutes This time was spent on one or more of the following: performing physical exam; counseling and coordination of care; obtaining or reviewing history; documenting in the medical record; reviewing/ordering tests, medications or procedures; communicating with other healthcare professionals and discussing with patient's family/caregivers  Current Length of Stay: 1 day(s)  Current Patient Status: Inpatient   Certification Statement: The patient will continue to require additional inpatient hospital stay due to Acute blood loss anemia  Discharge Plan: Anticipate discharge in 24-48 hrs to home  Code Status: Level 1 - Full Code    Subjective:   Patient was seen and evaluated bedside  He is feeling better, abdominal pain improved    Objective:     Vitals:   Temp (24hrs), Av °F (36 7 °C), Min:97 5 °F (36 4 °C), Max:98 5 °F (36 9 °C)    Temp:  [97 5 °F (36 4 °C)-98 5 °F (36 9 °C)] 97 5 °F (36 4 °C)  HR:  [69-79] 69  Resp:  [17-20] 20  BP: (125-187)/(66-79) 166/79  SpO2:  [93 %-98 %] 98 %  Body mass index is 23 7 kg/m²  Input and Output Summary (last 24 hours): Intake/Output Summary (Last 24 hours) at 2023 1732  Last data filed at 2023 2100  Gross per 24 hour   Intake 10 ml   Output --   Net 10 ml       Physical Exam:   Physical Exam  Constitutional:       General: He is not in acute distress  HENT:      Head: Atraumatic     Cardiovascular:      Rate and Rhythm: Normal rate and regular rhythm  Heart sounds: No murmur heard  No friction rub  No gallop  Pulmonary:      Effort: Pulmonary effort is normal  No respiratory distress  Breath sounds: Normal breath sounds  No wheezing  Abdominal:      General: Bowel sounds are normal  There is no distension  Palpations: Abdomen is soft  Tenderness: There is no abdominal tenderness  Musculoskeletal:         General: No swelling  Cervical back: Neck supple  Skin:     General: Skin is warm and dry  Neurological:      General: No focal deficit present  Mental Status: He is alert  Psychiatric:         Mood and Affect: Mood normal           Additional Data:     Labs:  Results from last 7 days   Lab Units 02/28/23  0513   WBC Thousand/uL 8 36   HEMOGLOBIN g/dL 8 6*   HEMATOCRIT % 26 4*   PLATELETS Thousands/uL 458*   NEUTROS PCT % 58   LYMPHS PCT % 29   MONOS PCT % 9   EOS PCT % 2     Results from last 7 days   Lab Units 02/28/23  0513 02/27/23  0441 02/26/23  1116   SODIUM mmol/L 140   < > 138   POTASSIUM mmol/L 3 6   < > 3 5   CHLORIDE mmol/L 116*   < > 113*   CO2 mmol/L 15*   < > 16*   BUN mg/dL 20   < > 21   CREATININE mg/dL 1 28   < > 1 34*   ANION GAP mmol/L 9   < > 9   CALCIUM mg/dL 8 4   < > 8 9   ALBUMIN g/dL  --   --  4 0   TOTAL BILIRUBIN mg/dL  --   --  0 26   ALK PHOS U/L  --   --  75   ALT U/L  --   --  10   AST U/L  --   --  11*   GLUCOSE RANDOM mg/dL 81   < > 106    < > = values in this interval not displayed       Results from last 7 days   Lab Units 02/26/23  1136   INR  1 05             Results from last 7 days   Lab Units 02/26/23  1116   LACTIC ACID mmol/L 0 7       Lines/Drains:  Invasive Devices     Peripheral Intravenous Line  Duration           Peripheral IV 02/26/23 Right Antecubital 2 days                      Imaging: Reviewed radiology reports from this admission including: abdominal/pelvic CT    Recent Cultures (last 7 days):         Last 24 Hours Medication List:   Current Facility-Administered Medications   Medication Dose Route Frequency Provider Last Rate   • albuterol  2 puff Inhalation Q4H PRN Marylee Rhody Prechtel, DO     • ferrous sulfate  325 mg Oral HS Johnson S Prechtel, DO     • losartan  50 mg Oral Daily Johnson S Prechtel, DO     • nicotine  1 patch Transdermal Daily Johnson S Prechtel, DO     • ondansetron  4 mg Intravenous Q6H PRN Angelika Marino PA-C     • pantoprazole  40 mg Oral Early Morning Won Fritz MD     • polyethylene glycol  4,000 mL Oral See Admin Instructions Ray Isaac MD     • pravastatin  80 mg Oral Daily With Dinner Luis Chambers, DO          Today, Patient Was Seen By: Brayden Brown MD    **Please Note: This note may have been constructed using a voice recognition system  ** - - -

## 2023-02-28 NOTE — CASE MANAGEMENT
Case Management Assessment & Discharge Planning Note    Patient name Fior Dhaliwal  Location [facility] [address]/E5 MS [phone] MRN [id number]  : 1950 Date 2023       Current Admission Date: 2023  Current Admission Diagnosis:GI bleed   Patient Active Problem List    Diagnosis Date Noted   • GI bleed 2023   • Cancer ([facility] )    • BPH with urinary obstruction 2022   • Bladder neck contracture 2022   • Stage 3 chronic kidney disease ([facility] [address] ) 2022   • Moderate aortic valve stenosis 2019   • Colon cancer screening 2019   • Shortness of breath on exertion 2019   • Hypotension 2016   • Acute kidney failure ([facility] [address] ) 2016   • PVD (peripheral vascular disease) ([place] [address] )    • Lung nodules    • Hypertension    • History of prostate cancer    • Acute blood loss anemia    • Tobacco abuse       LOS (days): 1  Geometric Mean LOS (GMLOS) (days):   Days to GMLOS:     OBJECTIVE:    Risk of Unplanned Readmission Score: 10 65         Current admission status: Inpatient       Preferred Pharmacy:   [facility] [address] [facility], [address] [place] [id number]  Phone: [phone] Fax: [phone]    Primary Care Provider: Bibi Ramos MD    Primary Insurance: [address] [facility]  Secondary Insurance: MEDICARE    ASSESSMENT:  Active Health Care Proxies    There are no active Health Care Proxies on file         Advance Directives  Does patient have a [address]?: No  Does patient have Advance Directives?: No  Primary Contact: Sophie Greer [phone]    Readmission Root Cause  30 Day Readmission: No    Patient Information  Admitted from[de-identified] Home  Mental Status: Alert  During Assessment patient was accompanied by: Not accompanied during assessment  Assessment information provided by[de-identified] Patient  Primary Caregiver: Self  Support Systems: Self, Spouse/significant other  [place] of Residence: [address] [place] [address] do you live in?: [address] access options  Select all that apply : Ramp  Type of Current Residence: 2 story home  In the last 12 months, how many places have you lived?: 1  Homeless/housing insecurity resource given?: N/A  Living Arrangements: Lives w/ Spouse/significant other  Is patient a ?: Yes    Activities of Daily Living Prior to Admission  Functional Status: Independent  Completes ADLs independently?: Yes  Ambulates independently?: Yes  Does patient use assisted devices?: No  Does patient currently own DME?: No  Does patient have a history of Outpatient Therapy (PT/OT)?: No  Does the patient have a history of Short-Term Rehab?: No  Does patient have a history of HHC?: No  Does patient currently have [address]?: No    Patient Information Continued  Does patient have prescription coverage?: Yes  Does patient have a history of substance abuse?: No  Does patient have a history of Mental Health Diagnosis?: No    Means of Transportation  Means of Transport to Appts[de-identified] Drives Self        DISCHARGE DETAILS:    Discharge planning discussed with[de-identified] patient     CM contacted family/caregiver?: No- see comments (ipta)    Contacts  Patient Contacts: Calvin Leigh  Relationship to Patient[de-identified] Family  Contact Method: Phone  Phone Number: [phone]  Reason/Outcome: Emergency Contact    Additional Comments: Patient is IPTA, no CM needs anticipated

## 2023-03-01 VITALS
SYSTOLIC BLOOD PRESSURE: 140 MMHG | RESPIRATION RATE: 19 BRPM | HEART RATE: 64 BPM | WEIGHT: 146.83 LBS | OXYGEN SATURATION: 93 % | BODY MASS INDEX: 23.6 KG/M2 | TEMPERATURE: 97.8 F | DIASTOLIC BLOOD PRESSURE: 71 MMHG | HEIGHT: 66 IN

## 2023-03-01 LAB
ANION GAP SERPL CALCULATED.3IONS-SCNC: 8 MMOL/L (ref 4–13)
BUN SERPL-MCNC: 18 MG/DL (ref 5–25)
CALCIUM SERPL-MCNC: 8.3 MG/DL (ref 8.4–10.2)
CHLORIDE SERPL-SCNC: 114 MMOL/L (ref 96–108)
CO2 SERPL-SCNC: 17 MMOL/L (ref 21–32)
CREAT SERPL-MCNC: 1.26 MG/DL (ref 0.6–1.3)
ERYTHROCYTE [DISTWIDTH] IN BLOOD BY AUTOMATED COUNT: 14.3 % (ref 11.6–15.1)
GFR SERPL CREATININE-BSD FRML MDRD: 56 ML/MIN/1.73SQ M
GLUCOSE SERPL-MCNC: 89 MG/DL (ref 65–140)
HCT VFR BLD AUTO: 25.7 % (ref 36.5–49.3)
HGB BLD-MCNC: 8.2 G/DL (ref 12–17)
MCH RBC QN AUTO: 29.4 PG (ref 26.8–34.3)
MCHC RBC AUTO-ENTMCNC: 31.9 G/DL (ref 31.4–37.4)
MCV RBC AUTO: 92 FL (ref 82–98)
PLATELET # BLD AUTO: 440 THOUSANDS/UL (ref 149–390)
PMV BLD AUTO: 9.1 FL (ref 8.9–12.7)
POTASSIUM SERPL-SCNC: 3.7 MMOL/L (ref 3.5–5.3)
RBC # BLD AUTO: 2.79 MILLION/UL (ref 3.88–5.62)
SODIUM SERPL-SCNC: 139 MMOL/L (ref 135–147)
WBC # BLD AUTO: 8.43 THOUSAND/UL (ref 4.31–10.16)

## 2023-03-01 RX ORDER — NICOTINE 21 MG/24HR
1 PATCH, TRANSDERMAL 24 HOURS TRANSDERMAL DAILY
Qty: 28 PATCH | Refills: 0 | Status: SHIPPED | OUTPATIENT
Start: 2023-03-02

## 2023-03-01 RX ORDER — PANTOPRAZOLE SODIUM 40 MG/1
40 TABLET, DELAYED RELEASE ORAL
Qty: 30 TABLET | Refills: 1 | Status: SHIPPED | OUTPATIENT
Start: 2023-03-02 | End: 2023-04-01

## 2023-03-01 RX ADMIN — PANTOPRAZOLE SODIUM 40 MG: 40 TABLET, DELAYED RELEASE ORAL at 05:26

## 2023-03-01 RX ADMIN — LOSARTAN POTASSIUM 50 MG: 50 TABLET, FILM COATED ORAL at 08:12

## 2023-03-01 NOTE — DISCHARGE SUMMARY
[facility]  Discharge- Braden Redmond 1950, 68 y o  male MRN: [id number]  Unit/Bed#: E5 -01 Encounter: [id number]  Primary Care Provider: Jessee Moore MD   Date and time admitted to hospital: 2/26/2023 10:50 AM    * GI bleed  Assessment & Plan  · Complains of black stools, for 2 weeks  · Baseline hemoglobin 12-13, Hgb on admission 8 8  · He is on Plavix and Pletal for peripheral artery disease  · EGD showed duodenitis, mildly edematous gastric mucosa, sliding hiatal hernia, no etiology of melena  · Colonoscopy showed sigmoid diverticulosis associated with moderate narrowing and erythematous and edematous mucosa concerning for diverticulitis or SCAD  Small internal/external hemorrhoids  No evidence of bleeding  · Discussed with gastroenterology, no need to treat colitis at this point, his pain resolved and is uncomplicated on colonoscopy      · Continue pantoprazole 40 mg daily  · Can resume Plavix and Pletal  · Outpatient follow-up with gastroenterology, will consider capsule endoscopy  · Hemoglobin remained stable in the range of 8    Acute blood loss anemia  Assessment & Plan  · Suspect in the setting of GI bleed, patient had melena for 2 weeks  · EGD and colonoscopy without source of bleeding  · Outpatient follow-up with gastroenterology and will consider capsule endoscopy  · Hemoglobin remained stable in the range of 8    Tobacco abuse  Assessment & Plan  · Nicotine patch    History of prostate cancer  Assessment & Plan  · History of prostate cancer, status post radical prostatectomy in 2005  · Recurrent bladder neck contracture, several procedures done for this    PVD (peripheral vascular disease) ([facility] [address] )  Assessment & Plan  · History of peripheral artery disease status post right SFA atherectomy with angioplasty in April 2021  · Restart Plavix and Pletal per gastroenterology  · Patient follow-up with vascular surgery      Medical Problems     Resolved Problems Date Reviewed: 3/1/2023   None       Discharging Physician / Practitioner: Nan Hansen MD  PCP: Noah Jordan MD  Admission Date:   Admission Orders (From admission, onward)     Ordered        02/27/23 1630  Inpatient Admission  Once            02/26/23 1419  Place in Observation  Once                      Discharge Date: 03/01/23    Consultations During Hospital Stay:  · Gastroenterology    Procedures Performed:   · EGD  IMPRESSION:  Erythematous and edematous mucosa in the duodenal bulb consistent with duodenitis  Mildly edematous gastric mucosa  Sliding hiatal hernia  Regular Z-line  No etiology of melena fou8nd    · Colonoscopy  IMPRESSION:  Sigmoid diverticulosis associated with moderate narrowing and erythematous and edematous mucosa concerning for diverticulitis or SCAD  Small internal and external hemorrhoids      Otherwise no evidence of bleeding up to the extent of terminal ileum  Significant Findings / Test Results:   · CT abdomen pelvis with contrast  IMPRESSION:  Sigmoid diverticulosis with chronic diverticular disease  Coexisting acute sigmoid diverticulitis cannot be entirely excluded  Otherwise, no evidence of acute abnormality in the abdomen or pelvis  Incidental Findings:   · None    Test Results Pending at Discharge (will require follow up):   · EGD and colonoscopy biopsies     Outpatient Tests Requested:  · None    Complications: None    Reason for Admission: GI bleed    Hospital Course:   Marcy White is a 68 y o  male patient who originally presented to the hospital on 2/26/2023 due to melena for 2 weeks  Baseline hemoglobin 12-13, hemoglobin on admission 8 8  Patient is on Plavix and Pletal for peripheral artery disease  Underwent EGD and colonoscopy  EGD showed duodenitis, but no etiology of melena    Colonoscopy showed sigmoid diverticulosis associated with moderate narrowing and erythematous and edematous mucosa concerning for diverticulitis or SCAD, no evidence of bleeding  Discussed with gastroenterology, no need to treat colitis at this point since his abdominal pain resolved and is uncomplicated on colonoscopy  Outpatient follow-up with gastroenterology for biopsies and they will consider capsule endoscopy  Hemoglobin remained stable in the range of 8, did not require transfusion  Pantoprazole 40 mg daily  Can resume Plavix and Pletal gastroenterology  Please see above list of diagnoses and related plan for additional information  Condition at Discharge: good    Discharge Day Visit / Exam:   Subjective: Patient was seen and evaluated bedside  Denies chest pain palpitation shortness of breath nausea vomiting  Abdominal pain resolved  Vitals: Blood Pressure: 140/71 (03/01/23 0728)  Pulse: 64 (03/01/23 0728)  Temperature: 97 8 °F (36 6 °C) (03/01/23 0728)  Temp Source: Oral (02/28/23 0813)  Respirations: 19 (03/01/23 0728)  Height: 5' 6" (167 6 cm) (02/28/23 0900)  Weight - Scale: 66 6 kg (146 lb 13 2 oz) (02/28/23 0900)  SpO2: 93 % (03/01/23 0728)  Exam:   Physical Exam  Constitutional:       General: He is not in acute distress  HENT:      Head: Atraumatic  Cardiovascular:      Rate and Rhythm: Normal rate and regular rhythm  Heart sounds: No murmur heard  No friction rub  No gallop  Pulmonary:      Effort: Pulmonary effort is normal  No respiratory distress  Breath sounds: Normal breath sounds  No wheezing  Abdominal:      General: Bowel sounds are normal  There is no distension  Palpations: Abdomen is soft  Tenderness: There is no abdominal tenderness  Musculoskeletal:         General: No swelling  Cervical back: Neck supple  Skin:     General: Skin is warm and dry  Neurological:      General: No focal deficit present  Mental Status: He is alert  Psychiatric:         Mood and Affect: Mood normal           Discussion with Family: Attempted to update  (daughter) via phone   Left voicemail  Discharge instructions/Information to patient and family:   See after visit summary for information provided to patient and family  Provisions for Follow-Up Care:  See after visit summary for information related to follow-up care and any pertinent home health orders  Disposition:   Home    Planned Readmission: no     Discharge Statement:  I spent 40 minutes discharging the patient  This time was spent on the day of discharge  I had direct contact with the patient on the day of discharge  Greater than 50% of the total time was spent examining patient, answering all patient questions, arranging and discussing plan of care with patient as well as directly providing post-discharge instructions  Additional time then spent on discharge activities  Discharge Medications:  See after visit summary for reconciled discharge medications provided to patient and/or family        **Please Note: This note may have been constructed using a voice recognition system**

## 2023-03-01 NOTE — DISCHARGE INSTR - AVS FIRST PAGE
Admitted with dark stools for 2 weeks  Your hemoglobin dropped in the range of 8  EGD showed duodenitis but no cause of GI bleed  Colonoscopy also without evidence of bleeding  Continue Protonix 40 mg daily  Outpatient follow-up with gastroenterology and they will consider capsule endoscopy  Per gastroenterology it is okay to resume Plavix and Pletal   Follow-up with vascular surgery

## 2023-03-01 NOTE — ASSESSMENT & PLAN NOTE
· Complains of black stools, for 2 weeks  · Baseline hemoglobin 12-13, Hgb on admission 8 8  · He is on Plavix and Pletal for peripheral artery disease  · EGD showed duodenitis, mildly edematous gastric mucosa, sliding hiatal hernia, no etiology of melena  · Colonoscopy showed sigmoid diverticulosis associated with moderate narrowing and erythematous and edematous mucosa concerning for diverticulitis or SCAD  Small internal/external hemorrhoids  No evidence of bleeding  · Discussed with gastroenterology, no need to treat colitis at this point, his pain resolved and is uncomplicated on colonoscopy      · Continue pantoprazole 40 mg daily  · Can resume Plavix and Pletal  · Outpatient follow-up with gastroenterology, will consider capsule endoscopy  · Hemoglobin remained stable in the range of 8

## 2023-03-01 NOTE — PLAN OF CARE
Problem: Potential for Falls  Goal: Patient will remain free of falls  Description: INTERVENTIONS:  - Educate patient/family on patient safety including physical limitations  - Instruct patient to call for assistance with activity   - Consult OT/PT to assist with strengthening/mobility   - Keep Call bell within reach  - Keep bed low and locked with side rails adjusted as appropriate  - Keep care items and personal belongings within reach  - Initiate and maintain comfort rounds  - Make Fall Risk Sign visible to staff  - Apply yellow socks and bracelet for high fall risk patients  - Consider moving patient to room near nurses station  3/1/2023 1238 by Kev Nuñez RN  Outcome: Adequate for Discharge  3/1/2023 1034 by Kev Nuñez RN  Outcome: Progressing     Problem: CARDIOVASCULAR - ADULT  Goal: Maintains optimal cardiac output and hemodynamic stability  Description: INTERVENTIONS:  - Monitor I/O, vital signs and rhythm  - Monitor for S/S and trends of decreased cardiac output  - Administer and titrate ordered vasoactive medications to optimize hemodynamic stability  - Assess quality of pulses, skin color and temperature  - Assess for signs of decreased coronary artery perfusion  - Instruct patient to report change in severity of symptoms  3/1/2023 1238 by Kev Nuñez RN  Outcome: Adequate for Discharge  3/1/2023 1034 by Kev Nuñez RN  Outcome: Progressing  Goal: Absence of cardiac dysrhythmias or at baseline rhythm  Description: INTERVENTIONS:  - Continuous cardiac monitoring, vital signs, obtain 12 lead EKG if ordered  - Administer antiarrhythmic and heart rate control medications as ordered  - Monitor electrolytes and administer replacement therapy as ordered  3/1/2023 1238 by Kev Nuñez RN  Outcome: Adequate for Discharge  3/1/2023 1034 by Kev Nuñez RN  Outcome: Progressing     Problem: GASTROINTESTINAL - ADULT  Goal: Maintains or returns to baseline bowel function  Description: INTERVENTIONS:  - Assess bowel function  - Encourage oral fluids to ensure adequate hydration  - Administer IV fluids if ordered to ensure adequate hydration  - Administer ordered medications as needed  - Encourage mobilization and activity  - Consider nutritional services referral to assist patient with adequate nutrition and appropriate food choices  3/1/2023 1238 by Emmett Hernandez RN  Outcome: Adequate for Discharge  3/1/2023 1034 by Emmett Hernandez RN  Outcome: Progressing     Problem: HEMATOLOGIC - ADULT  Goal: Maintains hematologic stability  Description: INTERVENTIONS  - Assess for signs and symptoms of bleeding or hemorrhage  - Monitor labs  - Administer supportive blood products/factors as ordered and appropriate  3/1/2023 1238 by Emmett Hernandez RN  Outcome: Adequate for Discharge  3/1/2023 1034 by Emmett Hernandez RN  Outcome: Progressing     Problem: Nutrition/Hydration-ADULT  Goal: Nutrient/Hydration intake appropriate for improving, restoring or maintaining nutritional needs  Description: Monitor and assess patient's nutrition/hydration status for malnutrition  Collaborate with interdisciplinary team and initiate plan and interventions as ordered  Monitor patient's weight and dietary intake as ordered or per policy  Utilize nutrition screening tool and intervene as necessary  Determine patient's food preferences and provide high-protein, high-caloric foods as appropriate       INTERVENTIONS:  - Monitor oral intake, urinary output, labs, and treatment plans  - Assess nutrition and hydration status and recommend course of action  - Evaluate amount of meals eaten  - Assist patient with eating if necessary   - Allow adequate time for meals  - Recommend/ encourage appropriate diets, oral nutritional supplements, and vitamin/mineral supplements  - Order, calculate, and assess calorie counts as needed  - Recommend, monitor, and adjust tube feedings and TPN/PPN based on assessed needs  - Assess need for intravenous fluids  - Provide specific nutrition/hydration education as appropriate  - Include patient/family/caregiver in decisions related to nutrition  3/1/2023 1238 by Perico Murphy RN  Outcome: Adequate for Discharge  3/1/2023 1034 by Perico Murphy RN  Outcome: Progressing

## 2023-03-01 NOTE — ASSESSMENT & PLAN NOTE
· History of peripheral artery disease status post right SFA atherectomy with angioplasty in April 2021  · Restart Plavix and Pletal per gastroenterology  · Patient follow-up with vascular surgery

## 2023-03-02 ENCOUNTER — TELEPHONE (OUTPATIENT)
Dept: GASTROENTEROLOGY | Facility: CLINIC | Age: 73
End: 2023-03-02

## 2023-03-02 NOTE — TELEPHONE ENCOUNTER
----- Message from Ally Lennon MD sent at 3/1/2023 12:21 PM EST -----  Hi,    Can you please help the patient set up a follow up appointment with any provider in 12 weeks  Diagnosis: diverticulitis    Thank you      Won

## 2023-03-18 ENCOUNTER — HOSPITAL ENCOUNTER (OUTPATIENT)
Dept: CT IMAGING | Facility: HOSPITAL | Age: 73
Discharge: HOME | End: 2023-03-18

## 2023-03-18 DIAGNOSIS — F17.200 SMOKER: ICD-10-CM

## 2023-04-27 ENCOUNTER — CONSULT (OUTPATIENT)
Dept: VASCULAR SURGERY | Facility: CLINIC | Age: 73
End: 2023-04-27

## 2023-04-27 VITALS
WEIGHT: 148 LBS | DIASTOLIC BLOOD PRESSURE: 76 MMHG | OXYGEN SATURATION: 100 % | SYSTOLIC BLOOD PRESSURE: 170 MMHG | BODY MASS INDEX: 23.78 KG/M2 | HEIGHT: 66 IN | HEART RATE: 73 BPM

## 2023-04-27 DIAGNOSIS — I70.213 ATHEROSCLEROSIS OF NATIVE ARTERIES OF EXTREMITIES WITH INTERMITTENT CLAUDICATION, BILATERAL LEGS (HCC): Primary | Chronic | ICD-10-CM

## 2023-04-27 DIAGNOSIS — I77.9 AORTO-ILIAC DISEASE (HCC): Chronic | ICD-10-CM

## 2023-04-27 DIAGNOSIS — I73.9 PERIPHERAL VASCULAR DISEASE, UNSPECIFIED (HCC): ICD-10-CM

## 2023-04-27 DIAGNOSIS — Z72.0 TOBACCO ABUSE: ICD-10-CM

## 2023-04-27 NOTE — PATIENT INSTRUCTIONS
Atherosclerosis of native arteries of extremities with intermittent claudication, bilateral legs (HCC)  Aortoiliac and infrainguinal arterial occlusive disease with bilateral calf claudication  Symptoms appear to be stable but limiting  We discussed the findings on previous noninvasive studies and recent abdominal CT scan which do show significant atherosclerotic disease  We also discussed indications for treatment and the treatment options available especially in light of his multiple previous procedures and continued smoking  In general I have suggested that repeated endovascular intervention which has recurred in the past would not be advisable and would likely recur if he continues to smoke  My suggestion is that he make further attempts to quit smoking and following that we will reevaluate  We will currently plan repeat noninvasive studies and office visit in approximately 3 months  In the interval I have asked him to continue a walking program     Unfortunately he is unable to tolerate antiplatelet therapy due to GI bleed and has been intolerant to Pletal   He does continue statin therapy  In addition he reports a metal allergy  Tobacco abuse  Cigarette smoking  We discussed the relationship between cigarette smoking and atherosclerotic disease and the limitations to any vascular intervention with continued smoking  He has been given a referral to the smoking cessation clinic and states he will make every attempt to quit

## 2023-04-27 NOTE — LETTER
April 27, 2023     Luis Powell MD  [facility] [id number]    Patient: Xiomara Larry   YOB: 1950   Date of Visit: 4/27/2023       Dear Dr Deanna Driscoll: Thank you for referring Xiomara Larry to me for evaluation  Below are the relevant portions of my assessment and plan of care  Diagnoses and all orders for this visit:    Atherosclerosis of native arteries of extremities with intermittent claudication, bilateral legs ([address] )  Aortoiliac and infrainguinal arterial occlusive disease with bilateral calf claudication  Symptoms appear to be stable but limiting  We discussed the findings on previous noninvasive studies and recent abdominal CT scan which do show significant atherosclerotic disease  We also discussed indications for treatment and the treatment options available especially in light of his multiple previous procedures and continued smoking  In general I have suggested that repeated endovascular intervention which has recurred in the past would not be advisable and would likely recur if he continues to smoke  My suggestion is that he make further attempts to quit smoking and following that we will reevaluate  We will currently plan repeat noninvasive studies and office visit in approximately 3 months  In the interval I have asked him to continue a walking program     Unfortunately he is unable to tolerate antiplatelet therapy due to GI bleed and has been intolerant to Pletal   He does continue statin therapy  In addition he reports a metal allergy  Tobacco abuse  Cigarette smoking  We discussed the relationship between cigarette smoking and atherosclerotic disease and the limitations to any vascular intervention with continued smoking  He has been given a referral to the smoking cessation clinic and states he will make every attempt to quit  If you have questions, please do not hesitate to call me   I look forward to following Yanet Garay along with you           Sincerely,        Jane López MD        CC: No Recipients

## 2023-04-27 NOTE — PROGRESS NOTES
Assessment/Plan:    Atherosclerosis of native arteries of extremities with intermittent claudication, bilateral legs (HCC)  Aortoiliac and infrainguinal arterial occlusive disease with bilateral calf claudication  Symptoms appear to be stable but limiting  We discussed the findings on previous noninvasive studies and recent abdominal CT scan which do show significant atherosclerotic disease  We also discussed indications for treatment and the treatment options available especially in light of his multiple previous procedures and continued smoking  In general I have suggested that repeated endovascular intervention which has recurred in the past would not be advisable and would likely recur if he continues to smoke  My suggestion is that he make further attempts to quit smoking and following that we will reevaluate  We will currently plan repeat noninvasive studies and office visit in approximately 3 months  In the interval I have asked him to continue a walking program     Unfortunately he is unable to tolerate antiplatelet therapy due to GI bleed and has been intolerant to Pletal   He does continue statin therapy  In addition he reports a metal allergy  Tobacco abuse  Cigarette smoking  We discussed the relationship between cigarette smoking and atherosclerotic disease and the limitations to any vascular intervention with continued smoking  He has been given a referral to the smoking cessation clinic and states he will make every attempt to quit         Diagnoses and all orders for this visit:    Atherosclerosis of native arteries of extremities with intermittent claudication, bilateral legs (HCC)  -     VAS abdominal aorta/iliacs; with RADHA's; Future  -     VAS lower limb arterial duplex, complete bilateral; Future    Peripheral vascular disease, unspecified ([facility] )  -     Ambulatory Referral to Vascular Surgery    Aorto-iliac disease ([facility] )  -     VAS abdominal aorta/iliacs; with RADHA's; Future  -     VAS "lower limb arterial duplex, complete bilateral; Future    Tobacco abuse  -     Ambulatory referral to Smoking Cessation Program; Future          Subjective:      Patient ID: Dasha Lloyd is a 68 y o  male  Patient is new to our office  He was referred here by Dr Albina Beach MD  Patient was a recent patient of the vascular department at [facility]  His last studies were done at [facility]  He had a Physiologic DIANE and a DIANE done 11/17/2022  Patient also had a CV done 11/17/2022  He c/o pain in both calves after walking about a 1/2 block  He denies any rest pain or open wounds  Patient admits to previous vascular interventions on his both legs  Patient is taking Pravastatin  He is a current smoker  54-year-old smoker with long history of peripheral arterial occlusive disease has had multiple endovascular procedures performed at [facility] for claudication symptoms  Procedures were done in 2001  He did note some improvement in his claudication initially following intervention but has had recurrent calf claudication over the past year  He currently describes discomfort in both calves which occurs after walking one half-1 block  This will then resolve with a few minutes of rest   He denies rest pain or wounds  Unfortunately he does continue to smoke approximate 1 pack/day  Though he is trying to quit numerous times he has had difficulty since his wife is a \"chain smoker \"  Of note he was also found recently to have a lung nodule and is pending biopsy  He also has a history of prostate cancer and has incontinence  Arterial duplex from [facility] 11/17/2022  On the right there is no significant stenosis in the femoral-popliteal segment with ankle-brachial index of 0 84  On the left there is a 50 to 75% SFA stenosis with ankle-brachial index 0 67  CT scan with contrast 2/26/2023 with atherosclerotic disease with moderate calcification in the aortoiliac segment    This is " not a dedicated arterial study but no severe stenosis is visualized  Carotid duplex live [place] 11/17/2022 with less than 50% bilateral carotid stenosis  The following portions of the patient's history were reviewed and updated as appropriate: allergies, current medications, past family history, past medical history, past social history, past surgical history and problem list     Past Medical History:  Past Medical History:   Diagnosis Date   • Anemia    • Arthritis    • Cancer ([place] [address] )     prostate   • COPD (chronic obstructive pulmonary disease) ([place] [address] )    • History of transfusion 2005    auto transfusion   • Hyperlipidemia    • Hypertension    • Leaky heart valve    • Lung nodules     unsure of dx   • PVD (peripheral vascular disease) ([place] [address] )    • Tobacco abuse    • UTI (urinary tract infection)        Past Surgical History:  Past Surgical History:   Procedure Laterality Date   • COLONOSCOPY     • HERNIA REPAIR      with mesh   • IL CYSTO CALIBRATION DILAT URTL STRIX/STENOSIS N/A 9/13/2022    Procedure: DILATATION URETHRAL, incision bladder neck contracture,cysto; Surgeon: Ashly Smallwood MD;  Location: [facility];  Service: Urology   • IL TRANSURETHRAL RESECTION BLADDER NECK N/A 3/8/2022    Procedure: RESECTION TRANSURETHRAL BLADDER NECK CONTRACTURE;  Surgeon: Ashly Smallwood MD;  Location: [facility];  Service: Urology   • PROSTATECTOMY     • TONSILLECTOMY         Social History:  Social History     Substance and Sexual Activity   Alcohol Use Yes   • Alcohol/week: 6 0 standard drinks   • Types: 6 Cans of beer per week     Social History     Substance and Sexual Activity   Drug Use Never     Social History     Tobacco Use   Smoking Status Every Day   • Packs/day: 1 00   • Types: Cigarettes   Smokeless Tobacco Never       Family History:  History reviewed  No pertinent family history  Allergies:   Allergies   Allergen Reactions   • Aspirin Other (See Comments)     Polyps in sinuses    • Nitrofurantoin GI Intolerance     Stomach cramping    • Sm Non-Asprin Sinus [Pseudoephedrine-Acetaminophen] Other (See Comments)     Patient advised to avoid due to polyps       • Ciprofloxacin Tachycardia   • Penicillins Rash   • Rosuvastatin Myalgia   • Sulfa Antibiotics Rash       Medications:    Current Outpatient Medications:   •  albuterol (PROVENTIL HFA,VENTOLIN HFA) 90 mcg/act inhaler, INHALE 2 PUFFS BY MOUTH EVERY FOUR HOURS HRLY AS NEEDED FOR BREATHING, Disp: , Rfl:   •  cholecalciferol (VITAMIN D3) 1,000 units tablet, Take 1,000 Units by mouth daily at bedtime, Disp: , Rfl:   •  ferrous sulfate 325 (65 Fe) mg tablet, Take 325 mg by mouth daily at bedtime, Disp: , Rfl:   •  losartan (COZAAR) 100 MG tablet, TAKE ONE-HALF TABLET BY MOUTH EVERY DAY FOR BLOOD PRESSURE/HEART, Disp: , Rfl:   •  pravastatin (PRAVACHOL) 80 mg tablet, TAKE 40MG (ONE-HALF TABLET) BY MOUTH EVERY DAY AT 5 PM FOR CHOLESTEROL, Disp: , Rfl:   •  cilostazol (PLETAL) 100 mg tablet, TAKE 100MG (1 TABLET) BY MOUTH TWICE A DAY, Disp: , Rfl:   •  clopidogrel (PLAVIX) 75 mg tablet, 75 mg, Disp: , Rfl:   •  nicotine (NICODERM CQ) 21 mg/24 hr TD 24 hr patch, Place 1 patch on the skin over 24 hours daily Do not start before March 2, 2023 , Disp: 28 patch, Rfl: 0  •  pantoprazole (PROTONIX) 40 mg tablet, Take 1 tablet (40 mg total) by mouth daily in the early morning Do not start before March 2, 2023 , Disp: 30 tablet, Rfl: 1    Vitals:  /76 (04/27/23 0824)    Temp      Pulse 73 (04/27/23 0824)   Resp      SpO2 100 % (04/27/23 0824)      Lab Results and Cultures:   CBC with diff:   Lab Results   Component Value Date    WBC 8 43 03/01/2023    HGB 8 2 (L) 03/01/2023    HCT 25 7 (L) 03/01/2023    MCV 92 03/01/2023     (H) 03/01/2023    MCH 29 4 03/01/2023    MCHC 31 9 03/01/2023    RDW 14 3 03/01/2023    MPV 9 1 03/01/2023    NRBC 0 02/28/2023   ,   BMP/CMP:  Lab Results   Component Value Date    K 3 7 03/01/2023     (H) 03/01/2023    CO2 17 (L) "03/01/2023    BUN 18 03/01/2023    CREATININE 1 26 03/01/2023    CALCIUM 8 3 (L) 03/01/2023    AST 11 (L) 02/26/2023    ALT 10 02/26/2023    ALKPHOS 75 02/26/2023    EGFR 56 03/01/2023   ,   Lipid Panel: No results found for: CHOL,   Coags:   Lab Results   Component Value Date    PTT 32 02/26/2023    INR 1 05 02/26/2023   ,       Review of Systems   Constitutional: Negative  HENT: Positive for rhinorrhea and sneezing  Eyes: Negative  Respiratory: Negative  Cardiovascular: Negative  Gastrointestinal: Negative  Endocrine: Negative  Genitourinary: Negative  Musculoskeletal:        Pain in legs when walking   Skin: Negative  Allergic/Immunologic: Positive for environmental allergies  Neurological: Negative  Hematological: Negative  Psychiatric/Behavioral: Negative  Objective:      /76 (BP Location: Left arm, Patient Position: Sitting, Cuff Size: Standard)   Pulse 73   Ht 5' 6\" (1 676 m)   Wt 67 1 kg (148 lb)   SpO2 100%   BMI 23 89 kg/m²          Physical Exam  Constitutional:       Appearance: Normal appearance  He is well-developed  HENT:      Head: Normocephalic and atraumatic  Eyes:      Conjunctiva/sclera: Conjunctivae normal    Neck:      Vascular: Carotid bruit (Bilateral) present  No JVD  Cardiovascular:      Rate and Rhythm: Normal rate and regular rhythm  Pulses:           Carotid pulses are 2+ on the right side and 2+ on the left side  Radial pulses are 2+ on the right side and 2+ on the left side  Femoral pulses are 1+ on the right side and 1+ on the left side  Popliteal pulses are 0 on the right side and 0 on the left side  Dorsalis pedis pulses are 0 on the right side and 0 on the left side  Posterior tibial pulses are 0 on the right side and 0 on the left side  Heart sounds: S1 normal and S2 normal  Murmur heard  Systolic murmur is present    Pulmonary:      Effort: Pulmonary effort is normal       " Breath sounds: Normal breath sounds  Abdominal:      General: There is no abdominal bruit  Palpations: Abdomen is soft  There is no pulsatile mass  Tenderness: There is no abdominal tenderness  Hernia: No hernia is present  Musculoskeletal:         General: No swelling, tenderness or deformity  Normal range of motion  Cervical back: Normal range of motion and neck supple  Skin:     General: Skin is warm and dry  Coloration: Skin is not pale  Neurological:      General: No focal deficit present  Mental Status: He is alert and oriented to person, place, and time  Sensory: No sensory deficit  Motor: No weakness        Gait: Gait normal    Psychiatric:         Mood and Affect: Mood normal          Speech: Speech normal          Behavior: Behavior normal

## 2023-04-27 NOTE — ASSESSMENT & PLAN NOTE
Aortoiliac and infrainguinal arterial occlusive disease with bilateral calf claudication  Symptoms appear to be stable but limiting  We discussed the findings on previous noninvasive studies and recent abdominal CT scan which do show significant atherosclerotic disease  We also discussed indications for treatment and the treatment options available especially in light of his multiple previous procedures and continued smoking  In general I have suggested that repeated endovascular intervention which has recurred in the past would not be advisable and would likely recur if he continues to smoke  My suggestion is that he make further attempts to quit smoking and following that we will reevaluate  We will currently plan repeat noninvasive studies and office visit in approximately 3 months  In the interval I have asked him to continue a walking program     Unfortunately he is unable to tolerate antiplatelet therapy due to GI bleed and has been intolerant to Pletal   He does continue statin therapy  In addition he reports a metal allergy

## 2023-04-27 NOTE — ASSESSMENT & PLAN NOTE
Cigarette smoking  We discussed the relationship between cigarette smoking and atherosclerotic disease and the limitations to any vascular intervention with continued smoking  He has been given a referral to the smoking cessation clinic and states he will make every attempt to quit

## 2023-05-01 ENCOUNTER — OFFICE VISIT (OUTPATIENT)
Dept: GASTROENTEROLOGY | Facility: MEDICAL CENTER | Age: 73
End: 2023-05-01

## 2023-05-01 VITALS
WEIGHT: 145 LBS | SYSTOLIC BLOOD PRESSURE: 144 MMHG | HEART RATE: 70 BPM | TEMPERATURE: 98.5 F | BODY MASS INDEX: 23.4 KG/M2 | DIASTOLIC BLOOD PRESSURE: 66 MMHG

## 2023-05-01 DIAGNOSIS — R19.7 DIARRHEA, UNSPECIFIED TYPE: ICD-10-CM

## 2023-05-01 DIAGNOSIS — D64.9 ANEMIA, UNSPECIFIED TYPE: ICD-10-CM

## 2023-05-01 DIAGNOSIS — K57.90 DIVERTICULAR DISEASE: ICD-10-CM

## 2023-05-01 DIAGNOSIS — K29.80 DUODENITIS: ICD-10-CM

## 2023-05-01 DIAGNOSIS — K92.1 GASTROINTESTINAL HEMORRHAGE WITH MELENA: Primary | ICD-10-CM

## 2023-05-01 DIAGNOSIS — Z72.0 TOBACCO ABUSE: ICD-10-CM

## 2023-05-01 RX ORDER — PANTOPRAZOLE SODIUM 40 MG/1
40 TABLET, DELAYED RELEASE ORAL DAILY
Qty: 90 TABLET | Refills: 3 | Status: SHIPPED | OUTPATIENT
Start: 2023-05-01

## 2023-05-01 NOTE — PROGRESS NOTES
[facility] - Outpatient Follow-up Note  Jada Tirado 68 y o  male MRN: [id number]  Encounter: [id number]          ASSESSMENT AND PLAN:    79-year-old male in with severe vascular disease, aortic stenosis, CKD, history of prostate cancer, tobacco use, lung nodule awaiting biopsy here for follow-up of hospitalization of obscure GI bleeding  Patient had an approximately 4 g hemoglobin drop in the setting of 2 weeks of intermittent melena  EGD demonstrated mild duodenitis  Colonoscopy demonstrated left-sided diverticular disease with segmental inflammation  He is no longer on Plavix or Pletal but reports intermittent black diarrhea concerning for ongoing bleeding  Will check CBC and iron studies as these have not been done since his discharge  If CBC is stable, will plan for outpatient capsule endoscopy to assess for small bowel blood loss  1  Gastrointestinal hemorrhage with melena  2  Anemia, unspecified type  - CBC and Platelet; Future  - Iron Panel (Includes Ferritin, Iron Sat%, Iron, and TIBC); Future  - Capsule endoscopy; Future    3  Diverticular disease  4  Diarrhea, unspecified type  Colonoscopy demonstrated segmental colitis with biopsy showing mild chronic inflammation and crypt distortion  Suspect this represents chronic diverticulitis but cannot exclude IBD  Also possible his diarrhea is related to bleeding so will assess GI bleed as above for now  5  Tobacco abuse  Reports he is currently unable to quit smoking as his wife is a chain smoker and will never stop smoking  6  Duodenitis  Recommended he continue PPI given duodenitis seen on EGD and concern for ongoing GI bleeding though no high risk lesions were seen on EGD  - pantoprazole (PROTONIX) 40 mg tablet; Take 1 tablet (40 mg total) by mouth daily  Dispense: 90 tablet;  Refill: 3    ______________________________________________________________________    SUBJECTIVE:    Hard to tell what bowels are like because of iron supplement but having intermittent really dark stools  Has constant diarrhea since starting iron supplement several years ago  Having 4 BM per day  Has not had repeat CBC since hospitalization  Off plavix and pletal since March, no improvement in bowels     Not taking pantoprazole  No acid reflux    Has tried to quit smoking but wife chain smokes so he can't quit    Feels lightheaded, usually after taking blood pressure pill    Admitted to [address] 2/26 to 3/1 for 2 weeks of black stools  Hemoglobin on presentation was 8 8 from baseline of 12-13  EGD 2/27 demonstrated 2 cm hiatal hernia, mild duodenitis, no active GI bleeding  Colonoscopy 2/28 with sigmoid diverticulosis with some luminal narrowing and possible scad  Sigmoid biopsies demonstrated vascular congestion, mild chronic inflammation, crypt distortion  No biopsies were taken from the EGD  REVIEW OF SYSTEMS IS OTHERWISE NEGATIVE  Historical Information   Past Medical History:   Diagnosis Date    Anemia     Arthritis     [facility]     prostate    COPD [facility]chronic obstructive [facility] disease[facility]     History of transfusion 2005    auto transfusion    Hyperlipidemia     Hypertension     Leaky heart valve     Lung nodules     unsure of dx    [facility]peripheral vascular disease) ([facility] )     Tobacco abuse     UTI (urinary tract infection)      Past Surgical History:   Procedure Laterality Date    COLONOSCOPY      HERNIA REPAIR      with mesh    OK CYSTO CALIBRATION DILAT URTL STRIX/STENOSIS N/A 9/13/2022    Procedure: DILATATION URETHRAL, incision bladder neck contracture,cysto;   Surgeon: Wiley Snow MD;  Location: [facility];  Service: Urology    OK TRANSURETHRAL RESECTION BLADDER NECK N/A 3/8/2022    Procedure: RESECTION TRANSURETHRAL BLADDER NECK CONTRACTURE;  Surgeon: Wiley Snow MD;  Location[facility];  Service: Urology   [address] [place] [address] Drive History   Social History     Substance and Sexual Activity   Alcohol Use Yes    Alcohol/week: 6 0 standard drinks    Types: 6 Cans of beer per week     Social History     Substance and Sexual Activity   Drug Use Never     Social History     Tobacco Use   Smoking Status Every Day    Packs/day: 1 00    Types: Cigarettes   Smokeless Tobacco Never     History reviewed  No pertinent family history  Meds/Allergies       Current Outpatient Medications:     albuterol (PROVENTIL HFA,VENTOLIN HFA) 90 mcg/act inhaler    cholecalciferol (VITAMIN D3) 1,000 units tablet    ferrous sulfate 325 (65 Fe) mg tablet    losartan (COZAAR) 100 MG tablet    pravastatin (PRAVACHOL) 80 mg tablet    cilostazol (PLETAL) 100 mg tablet    clopidogrel (PLAVIX) 75 mg tablet    nicotine (NICODERM CQ) 21 mg/24 hr TD 24 hr patch    pantoprazole (PROTONIX) 40 mg tablet    Allergies   Allergen Reactions    Aspirin Other (See Comments)     Polyps in sinuses     Nitrofurantoin GI Intolerance     Stomach cramping     Sm Non-Asprin Sinus [Pseudoephedrine-Acetaminophen] Other (See Comments)     Patient advised to avoid due to polyps    Ciprofloxacin Tachycardia    Penicillins Rash    Rosuvastatin Myalgia    Sulfa Antibiotics Rash           Objective   /66 (BP Location: Right arm)   Pulse 70   Temp 98 5 °F (36 9 °C)   Wt 65 8 kg (145 lb)   BMI 23 40 kg/m²       PHYSICAL EXAM:      General Appearance:   Alert, cooperative, no distress   HEENT:   Normocephalic, atraumatic, anicteric       Neck:  Supple, symmetrical, trachea midline   Lungs:   Clear to auscultation bilaterally; no rales, rhonchi or wheezing; respirations unlabored    Heart[de-identified]   Regular rate and rhythm; +9/3 systolic murmur   Abdomen:   Soft, non-tender, non-distended; normal bowel sounds; no masses, no organomegaly    Genitalia:   Deferred    Rectal:   Deferred    Extremities:  No cyanosis, clubbing or edema    Pulses:  2+ and symmetric    Skin:  No jaundice, rashes, or lesions Lymph nodes:  No palpable cervical lymphadenopathy        Lab Results:   No visits with results within 1 Day(s) from this visit     Latest known visit with results is:   Hospital Outpatient Visit on 04/11/2023   Component Date Value    A4C EF 04/11/2023 71     LVOT stroke volume 04/11/2023 85 63     LVOT stroke volume index 04/11/2023 46 90     LVIDd 04/11/2023 4 10     LVIDS 04/11/2023 2 90     IVSd 04/11/2023 1 20     LVPWd 04/11/2023 1 20     FS 04/11/2023 29     MV E' Tissue Velocity Se* 04/11/2023 7     E wave deceleration time 04/11/2023 253     MV Peak E Irvin 04/11/2023 79     MV Peak A Irvin 04/11/2023 1 01     AV LVOT peak gradient 04/11/2023 2     LVOT peak VTI 04/11/2023 20 62     LVOT peak irvin 04/11/2023 0 77     RVID d 04/11/2023 2 9     Tricuspid annular plane * 04/11/2023 2 30     LA size 04/11/2023 3 5     LA length (A2C) 04/11/2023 4 80     LVOT diameter 04/11/2023 2 3     Aortic valve peak veloci* 04/11/2023 2 36     Ao VTI 04/11/2023 59 21     AV mean gradient 04/11/2023 13     LVOT mn grad 04/11/2023 1 0     AV peak gradient 04/11/2023 22     AV area by cont VTI 04/11/2023 1 4     AV area peak irvin 04/11/2023 1 4     MV stenosis pressure 1/2* 04/11/2023 73     MV valve area p 1/2 meth* 04/11/2023 3 01     TR Peak Irvin 04/11/2023 1 9     Triscuspid Valve Regurgi* 04/11/2023 14 0     Ao root 04/11/2023 3 70     Asc Ao 04/11/2023 3 2     Aortic valve mean veloci* 04/11/2023 16 90     Tricuspid valve peak reg* 04/11/2023 1 86     Left ventricular stroke * 04/11/2023 40 00     IVS 04/11/2023 1 2     LEFT VENTRICLE SYSTOLIC * 54/10/1370 32     LV DIASTOLIC VOLUME (MOD* 36/96/4014 72     Left Atrium Area-systoli* 04/11/2023 19     Left Atrium Area-systoli* 04/11/2023 15 8     LVSV, 2D 04/11/2023 40     LVOT area 04/11/2023 4 15     DVI 04/11/2023 0 33     AV valve area 04/11/2023 1 45     LV EF 04/11/2023 71          Radiology Results:   Echo complete w/ contrast if indicated    Result Date: 2023  Narrative: Gray Castillo  Left Ventricle: Left ventricular cavity size is normal  There is mild concentric hypertrophy  The left ventricular ejection fraction is 71% by visual estimation  Systolic function is hyperdynamic  Wall motion is normal  Diastolic function is mildly abnormal, consistent with grade I (abnormal) relaxation    Right Ventricle: Right ventricular cavity size is normal  Systolic function is normal    Atrial Septum: The interatrial septum appears to be aneurysmal without clear evidence of shunting by color-flow Doppler    Aortic Valve: The aortic valve is trileaflet  The leaflets are mildly thickened  The leaflets are moderately calcified  There is mild stenosis with Vmax 2 37 m/s, RAD 1 5 cm2, DVI 0 36    Mitral Valve: There is mild regurgitation    Tricuspid Valve: There is trace regurgitation  Pulmonary artery systolic pressures are estimated at 17 mmHg    Aorta: The aortic root is ectatic at 3 7 cm  The ascending aorta is normal in size    Compared to report from 2019, there is mild aortic stenosis with higher velocities and small area in previously noted  Holter monitor    Result Date: 2023  Narrative: PT NAME: Ivania Marcial : 1950  AGE: 68 y o  GENDER: male MRN: [id number]   PROCEDURE: Holter monitor INDICATIONS: Palpitations/tachycardia DESCRIPTION OF FINDINGS: The patient was monitored for a total of 48 hours  The patient was predominantly in sinus rhythm throughout the study  The average heart rate was 75 beats per minute  The heart rate ranged from a low of 45 beats per minute to a maximum of 138 beats per minute  Ventricular ectopic activity consisted of 348 beats (0 2% of total beats), of which 219 were single PVCs, 123 were interpolated PVCs and 2 were ventricular couplets  There was no sustained or nonsustained ventricular tachycardia   Supraventricular ectopic activity consisted of 1845 beats (0 9% of total beats), of which 42 were single PACs, 1779 were late beats, and 8 were atrial couplets  There were 3 atrial runs, longest of which was 10 beats  There was no supraventricular tachycardia identified  There was no evidence of atrial fibrillation or atrial flutter  There were no significant pauses  The longest R-R interval was 1 9 seconds  There was no evidence of advanced degree heart block  The accompanying patient diary notes no symptoms  Impression: Predominantly sinus rhythm, with an average heart rate of 75 beats per minute Rare premature atrial contractions Rare premature ventricular contractions No significant pauses or advanced degree heart block Attending Physician: Mann Gavin MD     Answers for HPI/ROS submitted by the patient on 4/24/2023  Chronicity: recurrent  Onset: more than 1 month ago  Onset quality: undetermined  Frequency: every several days  Progression since onset: waxing and waning  Pain location: suprapubic region  Pain - numeric: 5/10  Pain quality: cramping  Radiates to: does not radiate  anorexia: No  arthralgias: No  belching: No  constipation: No  diarrhea: Yes  dysuria: No  fever: No  flatus: No  frequency: Yes  headaches: No  hematuria: No  melena: Yes  myalgias: Yes  nausea:  No  weight loss: Yes  vomiting: No  Aggravated by: nothing  Relieved by: bowel movements  Diagnostic workup: CT scan, lower endoscopy, upper endoscopy

## 2023-05-08 ENCOUNTER — HOSPITAL ENCOUNTER (INPATIENT)
Dept: CT IMAGING | Facility: HOSPITAL | Age: 73
LOS: 3 days | Discharge: HOME | End: 2023-05-11
Attending: RADIOLOGY | Admitting: STUDENT IN AN ORGANIZED HEALTH CARE EDUCATION/TRAINING PROGRAM

## 2023-05-08 DIAGNOSIS — R91.8 LUNG NODULES: ICD-10-CM

## 2023-05-08 DIAGNOSIS — C34.91 SQUAMOUS CELL CARCINOMA OF LUNG, RIGHT (HCC): Primary | ICD-10-CM

## 2023-05-08 PROBLEM — J93.9 PNEUMOTHORAX: Status: ACTIVE | Noted: 2023-05-08

## 2023-05-08 LAB
ANION GAP SERPL CALCULATED.3IONS-SCNC: 5 MMOL/L (ref 4–13)
BASOPHILS # BLD AUTO: 0.05 THOUSANDS/ÂΜL (ref 0–0.1)
BASOPHILS NFR BLD AUTO: 0 % (ref 0–1)
BUN SERPL-MCNC: 21 MG/DL (ref 5–25)
CALCIUM SERPL-MCNC: 8.7 MG/DL (ref 8.4–10.2)
CHLORIDE SERPL-SCNC: 111 MMOL/L (ref 96–108)
CO2 SERPL-SCNC: 21 MMOL/L (ref 21–32)
CREAT SERPL-MCNC: 1.31 MG/DL (ref 0.6–1.3)
EOSINOPHIL # BLD AUTO: 0.17 THOUSAND/ÂΜL (ref 0–0.61)
EOSINOPHIL NFR BLD AUTO: 1 % (ref 0–6)
ERYTHROCYTE [DISTWIDTH] IN BLOOD BY AUTOMATED COUNT: 13.7 % (ref 11.6–15.1)
GFR SERPL CREATININE-BSD FRML MDRD: 53 ML/MIN/1.73SQ M
GLUCOSE SERPL-MCNC: 88 MG/DL (ref 65–140)
HCT VFR BLD AUTO: 36.7 % (ref 36.5–49.3)
HGB BLD-MCNC: 11.6 G/DL (ref 12–17)
IMM GRANULOCYTES # BLD AUTO: 0.04 THOUSAND/UL (ref 0–0.2)
IMM GRANULOCYTES NFR BLD AUTO: 0 % (ref 0–2)
LYMPHOCYTES # BLD AUTO: 2.02 THOUSANDS/ÂΜL (ref 0.6–4.47)
LYMPHOCYTES NFR BLD AUTO: 16 % (ref 14–44)
MCH RBC QN AUTO: 29.6 PG (ref 26.8–34.3)
MCHC RBC AUTO-ENTMCNC: 31.6 G/DL (ref 31.4–37.4)
MCV RBC AUTO: 94 FL (ref 82–98)
MONOCYTES # BLD AUTO: 0.76 THOUSAND/ÂΜL (ref 0.17–1.22)
MONOCYTES NFR BLD AUTO: 6 % (ref 4–12)
NEUTROPHILS # BLD AUTO: 9.69 THOUSANDS/ÂΜL (ref 1.85–7.62)
NEUTS SEG NFR BLD AUTO: 77 % (ref 43–75)
NRBC BLD AUTO-RTO: 0 /100 WBCS
PLATELET # BLD AUTO: 278 THOUSANDS/UL (ref 149–390)
PMV BLD AUTO: 9.7 FL (ref 8.9–12.7)
POTASSIUM SERPL-SCNC: 4.5 MMOL/L (ref 3.5–5.3)
RBC # BLD AUTO: 3.92 MILLION/UL (ref 3.88–5.62)
SODIUM SERPL-SCNC: 137 MMOL/L (ref 135–147)
WBC # BLD AUTO: 12.73 THOUSAND/UL (ref 4.31–10.16)

## 2023-05-08 PROCEDURE — 0W9930Z DRAINAGE OF RIGHT PLEURAL CAVITY WITH DRAINAGE DEVICE, PERCUTANEOUS APPROACH: ICD-10-PCS | Performed by: INTERNAL MEDICINE

## 2023-05-08 PROCEDURE — 0BBC3ZX EXCISION OF RIGHT UPPER LUNG LOBE, PERCUTANEOUS APPROACH, DIAGNOSTIC: ICD-10-PCS | Performed by: INTERNAL MEDICINE

## 2023-05-08 RX ORDER — ALBUTEROL SULFATE 90 UG/1
2 AEROSOL, METERED RESPIRATORY (INHALATION) EVERY 4 HOURS PRN
Status: DISCONTINUED | OUTPATIENT
Start: 2023-05-08 | End: 2023-05-11 | Stop reason: HOSPADM

## 2023-05-08 RX ORDER — MIDAZOLAM HYDROCHLORIDE 2 MG/2ML
INJECTION, SOLUTION INTRAMUSCULAR; INTRAVENOUS AS NEEDED
Status: COMPLETED | OUTPATIENT
Start: 2023-05-08 | End: 2023-05-08

## 2023-05-08 RX ORDER — LIDOCAINE WITH 8.4% SOD BICARB 0.9%(10ML)
SYRINGE (ML) INJECTION AS NEEDED
Status: COMPLETED | OUTPATIENT
Start: 2023-05-08 | End: 2023-05-08

## 2023-05-08 RX ORDER — PRAVASTATIN SODIUM 20 MG
20 TABLET ORAL
Status: DISCONTINUED | OUTPATIENT
Start: 2023-05-08 | End: 2023-05-11 | Stop reason: HOSPADM

## 2023-05-08 RX ORDER — SODIUM CHLORIDE 9 MG/ML
76 INJECTION, SOLUTION INTRAVENOUS CONTINUOUS
Status: DISCONTINUED | OUTPATIENT
Start: 2023-05-08 | End: 2023-05-09

## 2023-05-08 RX ORDER — FERROUS SULFATE 325(65) MG
325 TABLET ORAL
Status: DISCONTINUED | OUTPATIENT
Start: 2023-05-08 | End: 2023-05-11 | Stop reason: HOSPADM

## 2023-05-08 RX ORDER — OXYCODONE HYDROCHLORIDE 5 MG/1
5 TABLET ORAL EVERY 4 HOURS PRN
Status: DISCONTINUED | OUTPATIENT
Start: 2023-05-08 | End: 2023-05-11 | Stop reason: HOSPADM

## 2023-05-08 RX ORDER — PANTOPRAZOLE SODIUM 40 MG/1
40 TABLET, DELAYED RELEASE ORAL
Status: DISCONTINUED | OUTPATIENT
Start: 2023-05-08 | End: 2023-05-11 | Stop reason: HOSPADM

## 2023-05-08 RX ORDER — NICOTINE 21 MG/24HR
1 PATCH, TRANSDERMAL 24 HOURS TRANSDERMAL DAILY
Status: DISCONTINUED | OUTPATIENT
Start: 2023-05-08 | End: 2023-05-11 | Stop reason: HOSPADM

## 2023-05-08 RX ORDER — LOSARTAN POTASSIUM 50 MG/1
100 TABLET ORAL DAILY
Status: DISCONTINUED | OUTPATIENT
Start: 2023-05-09 | End: 2023-05-11 | Stop reason: HOSPADM

## 2023-05-08 RX ORDER — FENTANYL CITRATE 50 UG/ML
INJECTION, SOLUTION INTRAMUSCULAR; INTRAVENOUS AS NEEDED
Status: COMPLETED | OUTPATIENT
Start: 2023-05-08 | End: 2023-05-08

## 2023-05-08 RX ADMIN — OXYCODONE 5 MG: 5 TABLET ORAL at 18:26

## 2023-05-08 RX ADMIN — MIDAZOLAM 1 MG: 1 INJECTION INTRAMUSCULAR; INTRAVENOUS at 08:52

## 2023-05-08 RX ADMIN — PRAVASTATIN SODIUM 20 MG: 20 TABLET ORAL at 19:51

## 2023-05-08 RX ADMIN — Medication 10 ML: at 08:55

## 2023-05-08 RX ADMIN — SODIUM CHLORIDE 75 ML/HR: 0.9 INJECTION, SOLUTION INTRAVENOUS at 07:20

## 2023-05-08 RX ADMIN — MIDAZOLAM 1 MG: 1 INJECTION INTRAMUSCULAR; INTRAVENOUS at 08:48

## 2023-05-08 RX ADMIN — FENTANYL CITRATE 50 MCG: 50 INJECTION INTRAMUSCULAR; INTRAVENOUS at 08:57

## 2023-05-08 RX ADMIN — FENTANYL CITRATE 50 MCG: 50 INJECTION INTRAMUSCULAR; INTRAVENOUS at 08:48

## 2023-05-08 RX ADMIN — PANTOPRAZOLE SODIUM 40 MG: 40 TABLET, DELAYED RELEASE ORAL at 18:26

## 2023-05-08 RX ADMIN — OXYCODONE 5 MG: 5 TABLET ORAL at 11:23

## 2023-05-08 RX ADMIN — FERROUS SULFATE TAB 325 MG (65 MG ELEMENTAL FE) 325 MG: 325 (65 FE) TAB at 21:31

## 2023-05-08 NOTE — ASSESSMENT & PLAN NOTE
44-year-old male presented to institution for his elective CT-guided right upper lung lobe biopsy  Procedure complicated with a small pneumothorax  He is status post CT-guided right anterior 8 Syrian chest tube placement    · Pulmonary consulted for assistance in the management of his chest tube  · Continue oxygen support

## 2023-05-08 NOTE — ASSESSMENT & PLAN NOTE
Aortoiliac and infrainguinal arterial occlusive disease with bilateral calf claudication  However, per outpatient provider note he is unable to tolerate antiplatelet therapy due to GI bleed and has been intolerant to Pletal   He also reports a metal allergy    · Continue outpatient follow-up with vascular

## 2023-05-08 NOTE — DISCHARGE INSTRUCTIONS
Needle Biopsy of the Lung    WHAT YOU NEED TO KNOW:  A needle biopsy of the lung is a procedure to remove cells or tissue from your lung  You may have a fine needle aspiration biopsy (FNAB), or a core needle biopsy (CNB)  A FNAB is used to remove cells through a thin needle  CNB uses a thicker needle to remove lung tissue  The samples are collected and tested for inflammation, infection, or cancer  DISCHARGE INSTRUCTIONS:   Resume your normal diet  Small sips of flat soda will help with nausea  Limit your activity for 24 hours  Wound Care:      - Remove band aid in 24 hours  Contact Interventional Radiology at [phone] Kenneth PATIENTS: Contact Interventional Radiology at [phone]) ([address]: Contact Interventional Radiology at [phone]) if any of the following occur:    - You have a fever greater than 101*    - You cough up large amounts of bright red blood     - You have chest pain with breathing    - You have shortness of breath    -You have persistent nausea and vomiting    - You have pus, redness or swelling around your biopsy site    - You have questions or concerns about your condition or care        Moderate Sedation   WHAT YOU NEED TO KNOW:   Moderate sedation, or conscious sedation, is medicine used during procedures to help you feel relaxed and calm  You will be awake and able to follow directions without anxiety or pain  You will remember little to none of the procedure  You may feel tired, weak, or unsteady on your feet after you get sedation  You may also have trouble concentrating or short-term memory loss  These symptoms should go away in 24 hours or less  DISCHARGE INSTRUCTIONS:   Call 911 or have someone else call for any of the following: You have sudden trouble breathing  You cannot be woken  Seek care immediately if:   You have a severe headache or dizziness       Your heart is beating faster than usual   Contact your healthcare provider if:   You have a fever  You have nausea or are vomiting for more than 8 hours after the procedure  Your skin is itchy, swollen, or you have a rash  You have questions or concerns about your condition or care  Self-care:   Have someone stay with you for 24 hours  This person can drive you to errands and help you do things around the house  This person can also watch for problems  Rest and do quiet activities for 24 hours  Do not exercise, ride a bike, or play sports  Stand up slowly to prevent dizziness and falls  Take short walks around the house with another person  Slowly return to your usual activities the next day  Do not drive or use dangerous machines or tools for 24 hours  You may injure yourself or others  Examples include a lawnmower, saw, or drill  Do not return to work for 24 hours if you use dangerous machines or tools for work  Do not make important decisions for 24 hours  For example, do not sign important papers or invest money  Drink liquids as directed  Liquids help flush the sedation medicine out of your body  Ask how much liquid to drink each day and which liquids are best for you  Eat small, frequent meals to prevent nausea and vomiting  Start with clear liquids such as juice or broth  If you do not vomit after clear liquids, you can eat your usual foods  Do not drink alcohol or take medicines that make you drowsy  This includes medicines that help you sleep and anxiety medicines  Ask your healthcare provider if it is safe for you to take pain medicine  Follow up with your healthcare provider as directed: Write down your questions so you remember to ask them during your visits  © 2017 [employer]  Information is for End User's use only and may not be sold, redistributed or otherwise used for commercial purposes  All illustrations and images included in CareNotes® are the copyrighted property of [employer]  or [employer]    The above information is an  only  It is not intended as medical advice for individual conditions or treatments  Talk to your doctor, nurse or pharmacist before following any medical regimen to see if it is safe and effective for you

## 2023-05-08 NOTE — H&P
[address] [facility]  H&P  Name: Daniel Luna 68 y o  male I MRN: [id number]  Unit/Bed#: [facility] IR ROOM I Date of Admission: 5/8/2023   Date of Service: 5/8/2023 I Hospital Day: 0      Assessment/Plan   * Pneumothorax  Assessment & Plan  19-year-old male presented to institution for his elective CT-guided right upper lung lobe biopsy  Procedure complicated with a small pneumothorax  He is status post CT-guided right anterior 8 [place] chest tube placement  · Pulmonary consulted for assistance in the management of his chest tube  · Continue oxygen support    Lung nodules  Assessment & Plan  Lung screen showed a 1 3 cm right upper lobe spiculated nodule s/p biopsy today  Complicated by pneumothorax  Stage 3 chronic kidney disease [facility])  Assessment & Plan  Lab Results   Component Value Date    EGFR 56 03/01/2023    EGFR 55 02/28/2023    EGFR 54 02/27/2023    CREATININE 1 26 03/01/2023    CREATININE 1 28 02/28/2023    CREATININE 1 30 02/27/2023     History of CKD  Monitor inpatient  History of prostate cancer  Assessment & Plan  History of prostate cancer, status post radical prostatectomy in 2005    Hypertension  Assessment & Plan  Reports taking losartan 100 mg daily  Continue this inpatient  Atherosclerosis of native arteries of extremities with intermittent claudication, bilateral legs (HCC)  Assessment & Plan  Aortoiliac and infrainguinal arterial occlusive disease with bilateral calf claudication  However, per outpatient provider note he is unable to tolerate antiplatelet therapy due to GI bleed and has been intolerant to Pletal   He also reports a metal allergy  · Continue outpatient follow-up with vascular         VTE Prophylaxis: Pharmacologic VTE Prophylaxis contraindicated due to post procedure    Code Status: full code    Anticipated Length of Stay:  Patient will be admitted on an Inpatient basis with an anticipated length of stay of  > 2 midnights     Justification for ANGE BABIN Stay: pulm eval, pneumothorax, chest tube management  Chief Complaint:   Lung nodule    History of Present Illness:    Hoang Robert is a 68 y o  male who presents with patient has a history of aorto iliac occlusive disease not on antiplatelet therapy due to GI bleeding, CKD, hypertension, and history of prostate cancer  Patient presented to our institution for his elective lung biopsy for his 1 3 cm lung nodule  Unfortunately, his procedure was complicated by a large right-sided pneumothorax  A chest tube was placed  Admission was requested for the management of his pneumothorax  Besides pain whenever he does deep breathing, he denies any other associated symptoms such as headache nausea, vomiting, shortness of breath, abdominal pain, fever, or chills  Review of Systems:    Review of Systems   Constitutional: Negative for chills, fatigue and fever  HENT: Negative for congestion  Respiratory: Positive for cough  Negative for shortness of breath  Cardiovascular: Negative for palpitations  Gastrointestinal: Negative for abdominal pain, diarrhea, nausea and vomiting  Endocrine: Negative for polyuria  Genitourinary: Negative for dysuria  Musculoskeletal: Positive for gait problem  Skin: Negative for color change and pallor  Neurological: Negative for weakness and headaches  Psychiatric/Behavioral: Negative for agitation and confusion         Past Medical and Surgical History:     Past Medical History:   Diagnosis Date   • Anemia    • Arthritis    • Cancer [facility])     prostate   • COPD (chronic obstructive pulmonary disease) ([facility] )    • History of transfusion 2005    auto transfusion   • Hyperlipidemia    • Hypertension    • Leaky heart valve    • Lung nodules     unsure of dx   • PVD (peripheral vascular disease) ([facility] )    • Tobacco abuse    • UTI (urinary tract infection)        Past Surgical History:   Procedure Laterality Date   • COLONOSCOPY     • HERNIA REPAIR      with mesh   • IR BIOPSY LUNG  5/8/2023   • UT CYSTO CALIBRATION DILAT URTL STRIX/STENOSIS N/A 9/13/2022    Procedure: DILATATION URETHRAL, incision bladder neck contracture,cysto; Surgeon: Elisa Lobo MD;  Location[facility];  Service: Urology   • UT TRANSURETHRAL RESECTION BLADDER NECK N/A 3/8/2022    Procedure: RESECTION TRANSURETHRAL BLADDER NECK CONTRACTURE;  Surgeon: Elisa Lobo MD;  Location[facility];  Service: Urology   • PROSTATECTOMY     • TONSILLECTOMY         Meds/Allergies:    Prior to Admission medications    Medication Sig Start Date End Date Taking? Authorizing Provider   albuterol (PROVENTIL HFA,VENTOLIN HFA) 90 mcg/act inhaler INHALE 2 PUFFS BY MOUTH EVERY FOUR HOURS HRLY AS NEEDED FOR BREATHING 12/13/21  Yes Historical Provider, MD   cholecalciferol (VITAMIN D3) 1,000 units tablet Take 1,000 Units by mouth daily at bedtime   Yes Historical Provider, MD   ferrous sulfate 325 (65 Fe) mg tablet Take 325 mg by mouth daily at bedtime   Yes Historical Provider, MD   losartan (COZAAR) 100 MG tablet TAKE ONE-HALF TABLET BY MOUTH EVERY DAY FOR BLOOD PRESSURE/HEART 1/27/22  Yes Historical Provider, MD   pantoprazole (PROTONIX) 40 mg tablet Take 1 tablet (40 mg total) by mouth daily 5/1/23  Yes Richard Coronel MD   pravastatin (PRAVACHOL) 80 mg tablet TAKE 40MG (ONE-HALF TABLET) BY MOUTH EVERY DAY AT 5 PM FOR CHOLESTEROL 1/20/23  Yes Historical Provider, MD     I have reviewed home medications with patient personally  Allergies: Allergies   Allergen Reactions   • Aspirin Other (See Comments)     Polyps in sinuses    • Nitrofurantoin GI Intolerance     Stomach cramping    • Sm Non-Asprin Sinus [Pseudoephedrine-Acetaminophen] Other (See Comments)     Patient advised to avoid due to polyps       • Ciprofloxacin Tachycardia   • Penicillins Rash   • Rosuvastatin Myalgia   • Sulfa Antibiotics Rash       Social History:     Marital Status: /Civil Union   Substance Use History:   Social History     Substance "and Sexual Activity   Alcohol Use Yes   • Alcohol/week: 6 0 standard drinks   • Types: 6 Cans of beer per week     Social History     Tobacco Use   Smoking Status Every Day   • Packs/day: 1 00   • Types: Cigarettes   Smokeless Tobacco Never   Tobacco Comments    One cigarette at 0400 5/8     Social History     Substance and Sexual Activity   Drug Use Never       Family History:    History reviewed  No pertinent family history  Physical Exam:     Vitals:   Blood Pressure: 156/67 (05/08/23 1455)  Pulse: 66 (05/08/23 1455)  Temperature: 97 8 °F (36 6 °C) (05/08/23 0659)  Temp Source: Temporal (05/08/23 0659)  Respirations: 20 (05/08/23 1455)  Height: 5' 6\" (167 6 cm) (05/08/23 0659)  Weight - Scale: 67 8 kg (149 lb 7 6 oz) (05/08/23 0659)  SpO2: 96 % (05/08/23 1455)    Physical Exam  Vitals reviewed  Constitutional:       General: He is not in acute distress  HENT:      Head: Normocephalic  Mouth/Throat:      Mouth: Mucous membranes are moist    Eyes:      General: No scleral icterus  Pupils: Pupils are equal, round, and reactive to light  Cardiovascular:      Rate and Rhythm: Normal rate and regular rhythm  Pulmonary:      Effort: Pulmonary effort is normal  No respiratory distress  Breath sounds: Decreased breath sounds (right chest, chest tube in place) present  Abdominal:      General: There is no distension  Palpations: Abdomen is soft  Tenderness: There is no abdominal tenderness  Musculoskeletal:      Right lower leg: No edema  Left lower leg: No edema  Skin:     General: Skin is warm  Neurological:      Mental Status: He is alert and oriented to person, place, and time  Psychiatric:         Mood and Affect: Mood normal          Behavior: Behavior normal        Additional Data:     Lab Results: I have personally reviewed pertinent reports  Imaging: I have personally reviewed pertinent reports        IR biopsy lung   Final Result by " Zulma Newton MD (05/08 1023)      CT-guided right upper lung nodule biopsy  Only the second sample yielded solid tissue, however fortunately this was deemed lesional by pathology  Biopsy complicated by large pneumothorax, successful CT-guided 8 [place] right anterior chest tube placement  Workstation performed: [id number]             EKG, Pathology, and Other Studies Reviewed on Admission:   reviewed  Allscripts / Epic Records Reviewed: Yes     ** Please Note: This note has been constructed using a voice recognition system   **

## 2023-05-08 NOTE — ASSESSMENT & PLAN NOTE
Lung screen showed a 1 3 cm right upper lobe spiculated nodule s/p biopsy today  Complicated by pneumothorax

## 2023-05-08 NOTE — BRIEF OP NOTE (RAD/CATH)
INTERVENTIONAL RADIOLOGY PROCEDURE NOTE    Date: 5/8/2023    Procedure:   Procedure Summary     Date: 05/08/23 Room / Location: [facility] CAT Scan    Anesthesia Start:  Anesthesia Stop:     Procedure: IR BIOPSY LUNG Diagnosis:       Lung nodules      (RUL nodule  3/18/2023 CT series 3, image #92)    Scheduled Providers: Waldemar Jay MD Responsible Provider:     Anesthesia Type: Not recorded ASA Status: Not recorded          Preoperative diagnosis:   1  Lung nodules         Postoperative diagnosis: Same  Surgeon: Waldemar Jay MD     Assistant: None  No qualified resident was available  Blood loss: Minimal    Specimens:   Two 1 3cm 18g cores  The first with scant tissue  The second was more solid with lesional tissue identified by pathology  A small PTX had formed and the patient was intermittently coughing, this caused a great deal of nodule movement, no further biopsy passes were attempted  Findings:     CT guided right upper lung nodule biopsy  CT guided right anterior 8F chest tube placement  Complications:     Large right PTX  Small amount of frans[place]-biopsy hemorrhage       Anesthesia: conscious sedation

## 2023-05-08 NOTE — H&P
"Interventional Radiology  History and Physical 5/8/2023     Daniel Luna   1950   [id number]    Assessment/Plan:    68year old male with right lung nodules identified on recent CT  Plan for CT guided biopsy  Procedure, risks, benefits and alternatives were discussed with the patient  Risk of PTX and hemorrhage explained in detail  Consent obtained at bedside  /67   Pulse 70   Temp 97 8 °F (36 6 °C) (Temporal)   Resp 14   Ht 5' 6\" (1 676 m)   Wt 67 8 kg (149 lb 7 6 oz)   SpO2 100%   BMI 24 13 kg/m²       Problem List Items Addressed This Visit        Respiratory    Lung nodules    Relevant Orders    IR biopsy lung          Subjective: Normal state of health, no acute complaints  Patient ID: Daniel Luna is a 68 y o  male  History of Present Illness  See A/P above  Review of Systems   Respiratory: Negative  Cardiovascular: Negative  Past Medical History:   Diagnosis Date   • Anemia    • Arthritis    • Cancer [facility])     prostate   • COPD (chronic obstructive pulmonary disease) ([facility] [facility] )    • History of transfusion 2005    auto transfusion   • Hyperlipidemia    • Hypertension    • Leaky heart valve    • Lung nodules     unsure of dx   • PVD (peripheral vascular disease) (HCC)    • Tobacco abuse    • UTI (urinary tract infection)         Past Surgical History:   Procedure Laterality Date   • COLONOSCOPY     • HERNIA REPAIR      with mesh   • [place] CYSTO CALIBRATION DILAT URTL STRIX/STENOSIS N/A 9/13/2022    Procedure: DILATATION URETHRAL, incision bladder neck contracture,cysto;   Surgeon: Malena Ennis MD;  [facility];  Service: Urology   • MN TRANSURETHRAL RESECTION BLADDER NECK N/A 3/8/2022    Procedure: RESECTION TRANSURETHRAL BLADDER NECK CONTRACTURE;  Surgeon: Malena Ennis MD;  [facility];  Service: Urology   • PROSTATECTOMY     • TONSILLECTOMY          Social History     Tobacco Use   Smoking Status Every Day   • Packs/day: 1 00   • Types: " "Cigarettes   Smokeless Tobacco Never   Tobacco Comments    One cigarette at 0400 5/8        Social History     Substance and Sexual Activity   Alcohol Use Yes   • Alcohol/week: 6 0 standard drinks   • Types: 6 Cans of beer per week        Social History     Substance and Sexual Activity   Drug Use Never        Allergies   Allergen Reactions   • Aspirin Other (See Comments)     Polyps in sinuses    • Nitrofurantoin GI Intolerance     Stomach cramping    • Sm Non-Asprin Sinus [Pseudoephedrine-Acetaminophen] Other (See Comments)     Patient advised to avoid due to polyps       • Ciprofloxacin Tachycardia   • Penicillins Rash   • Rosuvastatin Myalgia   • Sulfa Antibiotics Rash       Current Outpatient Medications   Medication Sig Dispense Refill   • albuterol (PROVENTIL HFA,VENTOLIN HFA) 90 mcg/act inhaler INHALE 2 PUFFS BY MOUTH EVERY FOUR HOURS HRLY AS NEEDED FOR BREATHING     • cholecalciferol (VITAMIN D3) 1,000 units tablet Take 1,000 Units by mouth daily at bedtime     • ferrous sulfate 325 (65 Fe) mg tablet Take 325 mg by mouth daily at bedtime     • losartan (COZAAR) 100 MG tablet TAKE ONE-HALF TABLET BY MOUTH EVERY DAY FOR BLOOD PRESSURE/HEART     • pantoprazole (PROTONIX) 40 mg tablet Take 1 tablet (40 mg total) by mouth daily 90 tablet 3   • pravastatin (PRAVACHOL) 80 mg tablet TAKE 40MG (ONE-HALF TABLET) BY MOUTH EVERY DAY AT 5 PM FOR CHOLESTEROL       Current Facility-Administered Medications   Medication Dose Route Frequency Provider Last Rate Last Admin   • sodium chloride 0 9 % infusion  75 mL/hr Intravenous Continuous Ayaan Marquez MD 75 mL/hr at 05/08/23 0720 75 mL/hr at 05/08/23 0720          Objective:    Vitals:    05/08/23 0659   BP: 151/67   Pulse: 70   Resp: 14   Temp: 97 8 °F (36 6 °C)   TempSrc: Temporal   SpO2: 100%   Weight: 67 8 kg (149 lb 7 6 oz)   Height: 5' 6\" (1 676 m)        Physical Exam  Pulmonary:      Effort: Pulmonary effort is normal            No results found for: BNP   Lab Results " Component Value Date    WBC 8 43 03/01/2023    HGB 8 2 (L) 03/01/2023    HCT 25 7 (L) 03/01/2023    MCV 92 03/01/2023     (H) 03/01/2023     Lab Results   Component Value Date    INR 1 05 02/26/2023    PROTIME 13 7 02/26/2023     Lab Results   Component Value Date    PTT 32 02/26/2023         I have personally reviewed pertinent imaging and laboratory results  Code Status: Prior  Advance Directive and Living Will:      Power of :    POLST:      This text is generated with voice recognition software  There may be translation, syntax,  or grammatical errors  If you have any questions, please contact the dictating provider

## 2023-05-08 NOTE — ASSESSMENT & PLAN NOTE
Lab Results   Component Value Date    EGFR 56 03/01/2023    EGFR 55 02/28/2023    EGFR 54 02/27/2023    CREATININE 1 26 03/01/2023    CREATININE 1 28 02/28/2023    CREATININE 1 30 02/27/2023     History of CKD  Monitor inpatient

## 2023-05-09 ENCOUNTER — APPOINTMENT (INPATIENT)
Dept: CT IMAGING | Facility: HOSPITAL | Age: 73
End: 2023-05-09

## 2023-05-09 LAB
ALBUMIN SERPL BCP-MCNC: 3.5 G/DL (ref 3.5–5)
ALP SERPL-CCNC: 76 U/L (ref 34–104)
ALT SERPL W P-5'-P-CCNC: 6 U/L (ref 7–52)
ANION GAP SERPL CALCULATED.3IONS-SCNC: 8 MMOL/L (ref 4–13)
AST SERPL W P-5'-P-CCNC: 10 U/L (ref 13–39)
BASOPHILS # BLD AUTO: 0.05 THOUSANDS/ÂΜL (ref 0–0.1)
BASOPHILS NFR BLD AUTO: 0 % (ref 0–1)
BILIRUB SERPL-MCNC: 0.33 MG/DL (ref 0.2–1)
BUN SERPL-MCNC: 22 MG/DL (ref 5–25)
CALCIUM SERPL-MCNC: 8.8 MG/DL (ref 8.4–10.2)
CHLORIDE SERPL-SCNC: 109 MMOL/L (ref 96–108)
CO2 SERPL-SCNC: 18 MMOL/L (ref 21–32)
CREAT SERPL-MCNC: 1.27 MG/DL (ref 0.6–1.3)
EOSINOPHIL # BLD AUTO: 0.18 THOUSAND/ÂΜL (ref 0–0.61)
EOSINOPHIL NFR BLD AUTO: 1 % (ref 0–6)
ERYTHROCYTE [DISTWIDTH] IN BLOOD BY AUTOMATED COUNT: 13.4 % (ref 11.6–15.1)
GFR SERPL CREATININE-BSD FRML MDRD: 55 ML/MIN/1.73SQ M
GLUCOSE SERPL-MCNC: 82 MG/DL (ref 65–140)
HCT VFR BLD AUTO: 35.2 % (ref 36.5–49.3)
HGB BLD-MCNC: 11.1 G/DL (ref 12–17)
IMM GRANULOCYTES # BLD AUTO: 0.04 THOUSAND/UL (ref 0–0.2)
IMM GRANULOCYTES NFR BLD AUTO: 0 % (ref 0–2)
LYMPHOCYTES # BLD AUTO: 1.78 THOUSANDS/ÂΜL (ref 0.6–4.47)
LYMPHOCYTES NFR BLD AUTO: 14 % (ref 14–44)
MAGNESIUM SERPL-MCNC: 1.9 MG/DL (ref 1.9–2.7)
MCH RBC QN AUTO: 29.6 PG (ref 26.8–34.3)
MCHC RBC AUTO-ENTMCNC: 31.5 G/DL (ref 31.4–37.4)
MCV RBC AUTO: 94 FL (ref 82–98)
MONOCYTES # BLD AUTO: 0.85 THOUSAND/ÂΜL (ref 0.17–1.22)
MONOCYTES NFR BLD AUTO: 6 % (ref 4–12)
NEUTROPHILS # BLD AUTO: 10.28 THOUSANDS/ÂΜL (ref 1.85–7.62)
NEUTS SEG NFR BLD AUTO: 79 % (ref 43–75)
NRBC BLD AUTO-RTO: 0 /100 WBCS
PHOSPHATE SERPL-MCNC: 2.7 MG/DL (ref 2.3–4.1)
PLATELET # BLD AUTO: 272 THOUSANDS/UL (ref 149–390)
PMV BLD AUTO: 9.7 FL (ref 8.9–12.7)
POTASSIUM SERPL-SCNC: 4.4 MMOL/L (ref 3.5–5.3)
PROT SERPL-MCNC: 6.6 G/DL (ref 6.4–8.4)
RBC # BLD AUTO: 3.75 MILLION/UL (ref 3.88–5.62)
SODIUM SERPL-SCNC: 135 MMOL/L (ref 135–147)
WBC # BLD AUTO: 13.18 THOUSAND/UL (ref 4.31–10.16)

## 2023-05-09 RX ORDER — SODIUM CHLORIDE 9 MG/ML
76 INJECTION, SOLUTION INTRAVENOUS CONTINUOUS
Status: DISPENSED | OUTPATIENT
Start: 2023-05-09 | End: 2023-05-09

## 2023-05-09 RX ADMIN — OXYCODONE 5 MG: 5 TABLET ORAL at 16:38

## 2023-05-09 RX ADMIN — PANTOPRAZOLE SODIUM 40 MG: 40 TABLET, DELAYED RELEASE ORAL at 05:54

## 2023-05-09 RX ADMIN — LOSARTAN POTASSIUM 100 MG: 50 TABLET, FILM COATED ORAL at 09:41

## 2023-05-09 RX ADMIN — FERROUS SULFATE TAB 325 MG (65 MG ELEMENTAL FE) 325 MG: 325 (65 FE) TAB at 21:09

## 2023-05-09 RX ADMIN — PRAVASTATIN SODIUM 20 MG: 20 TABLET ORAL at 16:36

## 2023-05-09 RX ADMIN — OXYCODONE 5 MG: 5 TABLET ORAL at 03:49

## 2023-05-09 RX ADMIN — SODIUM CHLORIDE 76 ML/HR: 0.9 INJECTION, SOLUTION INTRAVENOUS at 09:35

## 2023-05-09 NOTE — PLAN OF CARE
Problem: PHYSICAL THERAPY ADULT  Goal: Performs mobility at highest level of function for planned discharge setting  See evaluation for individualized goals  Description: Treatment/Interventions: Functional transfer training, LE strengthening/ROM, Elevations, Therapeutic exercise, Patient/family training, Equipment eval/education, Bed mobility, Gait training, Continued evaluation, Spoke to nursing, OT          See flowsheet documentation for full assessment, interventions and recommendations  Note: Prognosis: Good  Problem List: Decreased mobility, Decreased skin integrity  Assessment: Chirag Carmen is a 68 y o  male [facility] on 5/8/2023 for Pneumothorax  S/p biopsy and chest tube placement  PT was consulted and pt was seen on 5/9/2023 for mobility assessment and d/c planning  Pt presents w medium fall risk, multiple lines  At baseline is indep for ADLs, IADLs and ambulation without an AD  Pt is currently functioning at a modified independent assistance level for bed mobility, supervision assistance x1 level for transfers and ambulation without an AD bed>chair  Assessment of mobility limited as pt chest tube connected to wall suction and w limited tubing  Despite limited assessment pt w no obvious deficits of strength or balance  Pt will benefit from continued skilled IP PT to address the above mentioned impairments  in order to maximize recovery and increase functional independence when completing mobility and ADLs  At this time PT recommendations for d/c are home pending continued progress in therapy  Barriers to Discharge: None     PT Discharge Recommendation: No rehabilitation needs (will cont to monitor)    See flowsheet documentation for full assessment

## 2023-05-09 NOTE — PLAN OF CARE
Problem: MOBILITY - ADULT  Goal: Maintain or return to baseline ADL function  Description: INTERVENTIONS:  -  Assess patient's ability to carry out ADLs; assess patient's baseline for ADL function and identify physical deficits which impact ability to perform ADLs (bathing, care of mouth/teeth, toileting, grooming, dressing, etc )  - Assess/evaluate cause of self-care deficits   - Assess range of motion  - Assess patient's mobility; develop plan if impaired  - Assess patient's need for assistive devices and provide as appropriate  - Encourage maximum independence but intervene and supervise when necessary  - Involve family in performance of ADLs  - Assess for home care needs following discharge   - Consider OT consult to assist with ADL evaluation and planning for discharge  - Provide patient education as appropriate  Outcome: Progressing  Goal: Maintains/Returns to pre admission functional level  Description: INTERVENTIONS:  - Perform BMAT or MOVE assessment daily    - Set and communicate daily mobility goal to care team and patient/family/caregiver  - Collaborate with rehabilitation services on mobility goals if consulted  - Perform Range of Motion  times a day  - Reposition patient every  hours    - Dangle patient  times a day  - Stand patient  times a day  - Ambulate patient  times a day  - Out of bed to chair  times a day   - Out of bed for meals  times a day  - Out of bed for toileting  - Record patient progress and toleration of activity level   Outcome: Progressing     Problem: Prexisting or High Potential for Compromised Skin Integrity  Goal: Skin integrity is maintained or improved  Description: INTERVENTIONS:  - Identify patients at risk for skin breakdown  - Assess and monitor skin integrity  - Assess and monitor nutrition and hydration status  - Monitor labs   - Assess for incontinence   - Turn and reposition patient  - Assist with mobility/ambulation  - Relieve pressure over bony prominences  - Avoid friction and shearing  - Provide appropriate hygiene as needed including keeping skin clean and dry  - Evaluate need for skin moisturizer/barrier cream  - Collaborate with interdisciplinary team   - Patient/family teaching  - Consider wound care consult   Outcome: Progressing     Problem: PAIN - ADULT  Goal: Verbalizes/displays adequate comfort level or baseline comfort level  Description: Interventions:  - Encourage patient to monitor pain and request assistance  - Assess pain using appropriate pain scale  - Administer analgesics based on type and severity of pain and evaluate response  - Implement non-pharmacological measures as appropriate and evaluate response  - Consider cultural and social influences on pain and pain management  - Notify physician/advanced practitioner if interventions unsuccessful or patient reports new pain  Outcome: Progressing     Problem: INFECTION - ADULT  Goal: Absence or prevention of progression during hospitalization  Description: INTERVENTIONS:  - Assess and monitor for signs and symptoms of infection  - Monitor lab/diagnostic results  - Monitor all insertion sites, i e  indwelling lines, tubes, and drains  - Monitor endotracheal if appropriate and nasal secretions for changes in amount and color  - [place] appropriate cooling/warming therapies per order  - Administer medications as ordered  - Instruct and encourage patient and family to use good hand hygiene technique  - Identify and instruct in appropriate isolation precautions for identified infection/condition  Outcome: Progressing     Problem: SAFETY ADULT  Goal: Maintain or return to baseline ADL function  Description: INTERVENTIONS:  -  Assess patient's ability to carry out ADLs; assess patient's baseline for ADL function and identify physical deficits which impact ability to perform ADLs (bathing, care of mouth/teeth, toileting, grooming, dressing, etc )  - Assess/evaluate cause of self-care deficits   - Assess range of motion  - Assess patient's mobility; develop plan if impaired  - Assess patient's need for assistive devices and provide as appropriate  - Encourage maximum independence but intervene and supervise when necessary  - Involve family in performance of ADLs  - Assess for home care needs following discharge   - Consider OT consult to assist with ADL evaluation and planning for discharge  - Provide patient education as appropriate  Outcome: Progressing  Goal: Maintains/Returns to pre admission functional level  Description: INTERVENTIONS:  - Perform BMAT or MOVE assessment daily    - Set and communicate daily mobility goal to care team and patient/family/caregiver  - Collaborate with rehabilitation services on mobility goals if consulted  - Perform Range of Motion times a day  - Reposition patient every ours    - Dangle patient  times a day  - Stand patient times a day  - Ambulate patient  times a day  - Out of bed to chair  times a day   - Out of bed for meals times a day  - Out of bed for toileting  - Record patient progress and toleration of activity level   Outcome: Progressing  Goal: Patient will remain free of falls  Description: INTERVENTIONS:  - Educate patient/family on patient safety including physical limitations  - Instruct patient to call for assistance with activity   - Consult OT/PT to assist with strengthening/mobility   - Keep Call bell within reach  - Keep bed low and locked with side rails adjusted as appropriate  - Keep care items and personal belongings within reach  - Initiate and maintain comfort rounds  - Make Fall Risk Sign visible to staff  - Offer Toileting every  Hours, in advance of need  - Initiate/Maintain alarm  - Obtain necessary fall risk management equipment:   - Apply yellow socks and bracelet for high fall risk patients  - Consider moving patient to room near nurses station  Outcome: Progressing     Problem: DISCHARGE PLANNING  Goal: Discharge to home or other facility with appropriate resources  Description: INTERVENTIONS:  - Identify barriers to discharge w/patient and caregiver  - Arrange for needed discharge resources and transportation as appropriate  - Identify discharge learning needs (meds, wound care, etc )  - Arrange for interpretive services to assist at discharge as needed  - Refer to Case Management Department for coordinating discharge planning if the patient needs post-hospital services based on physician/advanced practitioner order or complex needs related to functional status, cognitive ability, or social support system  Outcome: Progressing     Problem: Knowledge Deficit  Goal: Patient/family/caregiver demonstrates understanding of disease process, treatment plan, medications, and discharge instructions  Description: Complete learning assessment and assess knowledge base    Interventions:  - Provide teaching at level of understanding  - Provide teaching via preferred learning methods  Outcome: Progressing

## 2023-05-09 NOTE — PHYSICAL THERAPY NOTE
PHYSICAL THERAPY EVALUATION          Patient Name: Hoang GRIMESKF'D Date: 5/9/2023  PT EVALUATION    68 y o     [id number]    Lung nodules [R91 8]    Past Medical History:   Diagnosis Date    Anemia     Arthritis     Cancer ([facility] )     prostate    COPD (chronic obstructive pulmonary disease) ([facility] )     History of transfusion 2005    auto transfusion    Hyperlipidemia     Hypertension     Leaky heart valve     Lung nodules     unsure of dx    PVD (peripheral vascular disease) (HCC)     Tobacco abuse     UTI (urinary tract infection)      Past Surgical History:   Procedure Laterality Date    COLONOSCOPY      HERNIA REPAIR      with mesh    IR BIOPSY LUNG  5/8/2023    TX CYSTO CALIBRATION DILAT URTL STRIX/STENOSIS N/A 9/13/2022    Procedure: DILATATION URETHRAL, incision bladder neck contracture,cysto; Surgeon: Stacy Archer MD;  [facility];  Service: Urology    TX TRANSURETHRAL RESECTION BLADDER NECK N/A 3/8/2022    Procedure: RESECTION TRANSURETHRAL BLADDER NECK CONTRACTURE;  Surgeon: Stacy Archer MD;  [facility];  Service: Urology    PROSTATECTOMY      TONSILLECTOMY          05/09/23 0822   PT Last Visit   PT Visit Date 05/09/23   Note Type   Note type Evaluation   Pain Assessment   Pain Assessment Tool 0-10   Pain Score No Pain   Restrictions/Precautions   Other Precautions Bed Alarm;Multiple lines; Fall Risk;O2  (2L, R chest tube)   Home Living   Type of [address] [place] [address] Two level;Ramped entrance;Bed/bath upstairs   Bathroom Shower/Tub   (walk in shower first fl, tub shower second fl)   Bathroom Toilet Standard   Bathroom Equipment Grab bars in shower; Shower chair  (in first fl bathroom)   Additional Comments main living area, full bathroom and laundry on first floor  goes to second fl for bedroom and bathroom   Prior Function   Level of [place] Independent with ADLs; Independent with functional mobility; Independent with IADLS   Lives With Spouse   Receives Help From [facility] IADLs Independent with driving; Independent with medication management  (does own laundry, spouse usually makes the meals)   Falls in the last 6 months 0   Vocational Retired   Comments indep at baseline for ADLs and ambulation without an AD  reports able to walk about 100 ft then experiences BLE burning limiting walking   General   Additional Pertinent History pt admitted 5/8/23 for PTX  underwent biopsy same day  up and oob orders  PMHx significant for arthritis, CA, COPD, PVD   Cognition   Overall Cognitive Status WFL   Arousal/Participation Cooperative   Attention Within functional limits   Orientation Level Oriented X4   Memory Within functional limits   Following Commands Follows all commands and directions without difficulty   RLE Assessment   RLE Assessment WFL  (4+)   LLE Assessment   LLE Assessment WFL  (4+)   Coordination   Sensation WFL  (does have onset of burning in LEs w amb)   Bed Mobility   Supine to Sit 6  Modified independent   Additional items Bedrails; Increased time required   Transfers   Sit to Stand 5  Supervision   Additional items Increased time required   Stand to Sit 5  Supervision   Additional items Armrests; Increased time required   Ambulation/Elevation   Gait pattern WNL; Wide ALEX   Gait Assistance 5  Supervision   Additional items Assist x 1   Assistive Device None   Distance 3' bed>chair   Balance   Static Standing Fair   Dynamic Standing Fair -   Ambulatory Fair -   Endurance Deficit   Endurance Deficit No  (denies SOB however O2 87-88% on 2L)   Activity Tolerance   Activity Tolerance Patient tolerated treatment well;Treatment limited secondary to medical complications (Comment)  (lines including chest tube to wall suction)   Medical Staff Made Aware Meghna EHRMAN   Nurse Made Aware Roma BRAGG   Assessment   Prognosis Good   Problem List Decreased mobility; Decreased skin integrity   Assessment Dayna Torrez is a 68 y o  male admitted to [facility] on 5/8/2023 for Pneumothorax   S/p biopsy and chest tube placement  PT was consulted and pt was seen on 5/9/2023 for mobility assessment and d/c planning  Pt presents w medium fall risk, multiple lines  At baseline is indep for ADLs, IADLs and ambulation without an AD  Pt is currently functioning at a modified independent assistance level for bed mobility, supervision assistance x1 level for transfers and ambulation without an AD bed>chair  Assessment of mobility limited as pt chest tube connected to wall suction and w limited tubing  Despite limited assessment pt w no obvious deficits of strength or balance  Pt will benefit from continued skilled IP PT to address the above mentioned impairments  in order to maximize recovery and increase functional independence when completing mobility and ADLs  At this time PT recommendations for d/c are home pending continued progress in therapy  Barriers to Discharge None   Goals   Patient Goals feel better   [facility] Expiration Date 05/19/23   Short Term Goal #1 1)  Pt will perform bed mobility indep demonstrating appropriate technique 100% of the time in order to improve function  2)  Perform all transfers Mckenna demonstrating safe and appropriate technique 100% of the time in order to improve ability to negotiate safely in home environment  3) Amb with least restrictive AD > 100'x1 with mod I in order to demonstrate ability to negotiate in home environment  4)  Improve overall strength and balance 1/2 grade in order to optimize ability to perform functional tasks and reduce fall risk  5) Increase activity tolerance to 45 minutes in order to improve endurance to functional tasks  6)  Negotiate stairs using most appropriate technique and S in order to be able to negotiate safely in home environment  7) PT for ongoing patient and family/caregiver education, DME needs and d/c planning in order to promote highest level of function in least restrictive environment     Plan   Treatment/Interventions Functional transfer training;DELL strengthening/ROM; Elevations; Therapeutic exercise;Patient/family training;Equipment eval/education; Bed mobility;Gait training;Continued evaluation;Spoke to nursing;OT   PT Frequency 2-3x/wk   Recommendation   PT Discharge Recommendation No rehabilitation needs  (will cont to monitor)   AM-PAC Basic Mobility Inpatient   Turning in Flat Bed Without Bedrails 4   Lying on Back to Sitting on Edge of Flat Bed Without Bedrails 4   Moving Bed to Chair 3   Standing Up From Chair Using Arms 3   Walk in Room 3   Climb 3-5 Stairs With Railing 3   Basic Mobility Inpatient Raw Score 20   Basic Mobility Standardized Score 43 99   Highest Level Of Mobility   -HLM Goal 6: Walk 10 steps or more   JH-HLM Achieved 4: Move to chair/commode   End of Consult   Patient Position at End of Consult Bedside chair; All needs within reach   History: co - morbidities, fall risk, multiple lines  Exam: impairments in systems including musculoskeletal (strength), neuromuscular (balance, gait, transfers, motor function and sensation), am-pac, cardiopulmonary, cognition  Clinical: unstable/unpredictable  Complexity:high      Maine Chamberlain, PT

## 2023-05-09 NOTE — PLAN OF CARE
Problem: OCCUPATIONAL THERAPY ADULT  Goal: Performs self-care activities at highest level of function for planned discharge setting  See evaluation for individualized goals  Description: Treatment Interventions: ADL retraining, Functional transfer training, UE strengthening/ROM, Endurance training, Patient/family training, Cognitive reorientation, Equipment evaluation/education, Compensatory technique education, Energy conservation, Activityengagement          See flowsheet documentation for full assessment, interventions and recommendations  Note: Limitation: Decreased ADL status, Decreased cognition, Decreased Safe judgement during ADL, Decreased UE strength, Decreased endurance, Decreased self-care trans, Decreased high-level ADLs  Prognosis: Good  Assessment: Pt is a 68 y o  male seen for OT evaluation s/p admit to [facility] on 5/8/2023 w/ Pneumothorax found when pt here for  elective CT-guided right upper lung lobe biopsy  Chest tube was placed  Comorbidities affecting pt's functional performance at time of assessment include: CKD III, lung nodules, h/o prostate CA, HTN, atherosclerosis of arteries w/ intermittent claudication of b/l LEs  Pt currently on 2L O2 and no O2 at baseline  Personal factors affecting pt at time of IE include:difficulty performing ADLS and difficulty performing IADLS   Prior to admission, pt was living w/ spouse and reports: per pt independent w/ ADLs, independent w/ functional transfers and mobility w/ no AD, independent w/ IADLs, driving  Upon evaluation: Pt requires supervision bed mobility, supervision functional transfers, assist x1 functional mobility w/ assist chest tube and O2 line management, supervision-min assist LB ADLs, setup UB ADLs, supervision toileting 2* the following deficits impacting occupational performance: decreased strength and endurance, dyspnea on exertion (87-93% on 2L O2 w/ cues for proper breathing techniques), impaired balance, impaired activity tolerance  Pt receptive to [facility] techniques of pacing self, planning ahead, taking rest breaks and proper breathing techniques  Pt to benefit from continued skilled OT tx while in the hospital to address deficits as defined above and maximize level of functional independence w ADL's and functional mobility  Occupational Performance areas to address include: grooming, bathing/shower, toilet hygiene, dressing, health maintenance, functional mobility, clothing management, cleaning and meal prep, EC education  From OT standpoint, recommendation at time of d/c would be home w/ family support and possible outpatient therapy for pulmonary  The patient's raw score on the AM-PAC Daily Activity Inpatient Short Form is 20  A raw score of greater than or equal to 19 suggests the patient may benefit from discharge to home  Please refer to the recommendation of the Occupational Therapist for safe discharge planning       OT Discharge Recommendation: Home with outpatient rehabilitation (pulmonary therapy ?, family support)

## 2023-05-09 NOTE — QUICK NOTE
IR Quick Note:    Review of 1300 CXR demonstrates a moderate sized pneumothorax  Called nurse and asked to place chest tube back to suction  Went up to evaluate patient and still no air leak present after back on suction  Wall suction adjusted appropriately to 60 mmHg  Updated pulmonology      [address] [place] [address] Travis Isacc Babin

## 2023-05-09 NOTE — ASSESSMENT & PLAN NOTE
· Lung screen showed a 1 3 cm right upper lobe spiculated nodule s/p biopsy 4/5/48  · Complicated by pneumothorax

## 2023-05-09 NOTE — OCCUPATIONAL THERAPY NOTE
Occupational Therapy Evaluation     Patient Name: Jackelyn HSU Date: 5/9/2023  Problem List  Principal Problem:    Pneumothorax  Active Problems:    Atherosclerosis of native arteries of extremities with intermittent claudication, bilateral legs (HCC)    Lung nodules    Hypertension    History of prostate cancer    Stage 3 chronic kidney disease ([facility] )    Past Medical History  Past Medical History:   Diagnosis Date    Anemia     Arthritis     Cancer ([facility] )     prostate    COPD (chronic obstructive pulmonary disease) ([facility] )     History of transfusion 2005    auto transfusion    Hyperlipidemia     Hypertension     Leaky heart valve     Lung nodules     unsure of dx    PVD (peripheral vascular disease) (HCC)     Tobacco abuse     UTI (urinary tract infection)      Past Surgical History  Past Surgical History:   Procedure Laterality Date    COLONOSCOPY      HERNIA REPAIR      with mesh    IR BIOPSY LUNG  5/8/2023    [place] CYSTO CALIBRATION DILAT URTL STRIX/STENOSIS N/A 9/13/2022    Procedure: DILATATION URETHRAL, incision bladder neck contracture,cysto; Surgeon: Saul Min MD;  [facility];  Service: Urology    [place] TRANSURETHRAL RESECTION BLADDER NECK N/A 3/8/2022    Procedure: RESECTION TRANSURETHRAL BLADDER NECK CONTRACTURE;  Surgeon: Saul Min MD;  [facility];  Service: Urology    PROSTATECTOMY      TONSILLECTOMY           05/09/23 0825   OT Last Visit   OT Visit Date 05/09/23   Note Type   Note type Evaluation   Pain Assessment   Pain Assessment Tool 0-10   Pain Score No Pain   Restrictions/Precautions   Weight Bearing Precautions Per Order No   Other Precautions Fall Risk;Multiple lines;Pain;Telemetry;O2  (2 LO2, chest tube)   Home Living   Type of [address] [place] [address] Two level; Able to live on main level with bedroom/bathroom; Performs ADLs on one level   Bathroom Shower/Tub Walk-in shower  (handicap accessible 1st floor, 2nd floor tube shower)   Bathroom Toilet Standard "  Bathroom Equipment Grab bars in shower; Shower chair   [address]   (no DME himself, wife uses  and [facility])   Additional Comments pt lives w/ spouse and they share IADL tasks, no AD at baseline   Prior Function   Level of [place] Independent with ADLs; Independent with functional mobility; Independent with IADLS   Lives With Spouse   Receives Help From Family   IADLs Independent with driving; Independent with meal prep; Independent with medication management   Falls in the last 6 months 0   Vocational Retired   Lifestyle   Autonomy per pt independent w/ ADLs, independent w/ functional transfers and mobility w/ no AD, independent w/ IADLs, driving   Reciprocal Relationships spouse, family   Service to Others worked construction   Intrinsic Gratification watching tv   [address] \"I am doing okay\"   ADL   Where Assessed Chair   Eating Assistance 5  [facility] 5  [address] [facility] 5  Supervision/Setup   LB [address] 5  [facility] 66  5  Supervision/Setup    [facility] 4  [address] 5  Supervision/Setup   Bed Mobility   Supine to Sit 5  Supervision   Additional items Increased time required; Bedrails;HOB elevated   Transfers   Sit to Stand 5  Supervision   Additional items Increased time required;Verbal cues; Bedrails;Assist x 1   Stand to Sit 5  Supervision   Additional items Assist x 1; Increased time required;Verbal cues;Armrests   Additional Comments assist for O2 and chest tube line management; extension tube provided to patient   Functional Mobility   Functional Mobility 5  Supervision   Additional Comments assist x1 w/ increased time and assist line management   Balance   Static Sitting Normal   Dynamic Sitting Good   Static Standing Fair +   Dynamic Standing Fair   Ambulatory Fair - " Activity Tolerance   Activity Tolerance Patient limited by fatigue;Treatment limited secondary to medical complications (Comment)  (dyspnea on exertion (87-92% on 2L O2))   Medical Staff Made Aware PT Vi Lemus: Pt seen for co-session with skilled Physical therapist 2* clinically unstable presentation, medical complexity, new precautions, performance deficits/functional limitations, limited activity tolerance and present impairments which are a regression from patient patient's baseline and impacting overall occupational performance   Nurse Made Aware appropriate to see per Ivette BRAGG   RUE Assessment   RUE Assessment WFL  (4-/5 grossly)   LUE Assessment   LUE Assessment WFL  (4-/5 grossly)   Hand Function   Gross Motor Coordination Functional   Fine Motor Coordination Functional   Sensation   Light Touch No apparent deficits   Proprioception   Proprioception No apparent deficits   Vision-Basic Assessment   Current Vision No visual deficits   Vision - Complex Assessment   Ocular Range of Motion Intact   Acuity Able to read clock/calendar on wall without difficulty   Cognition   Overall Cognitive Status WFL   Arousal/Participation Responsive; Alert; Cooperative   Attention Within functional limits   Orientation Level Oriented X4   Memory Within functional limits   Following Commands Follows all commands and directions without difficulty   Comments pleasant and cooperative, engages in appropriate conservation   Assessment   Limitation Decreased ADL status; Decreased cognition;Decreased Safe judgement during ADL;Decreased UE strength;Decreased endurance;Decreased self-care trans;Decreased high-level ADLs   Prognosis Good   Assessment Pt is a 68 y o  male seen for OT evaluation s/p admit to [facility] on 5/8/2023 w/ Pneumothorax found when pt here for  elective CT-guided right upper lung lobe biopsy  Chest tube was placed    Comorbidities affecting pt's functional performance at time of assessment include: CKD III, lung "nodules, h/o prostate CA, HTN, atherosclerosis of arteries w/ intermittent claudication of b/l LEs  Pt currently on 2L O2 and no O2 at baseline  Personal factors affecting pt at time of IE include:difficulty performing ADLS and difficulty performing IADLS   Prior to admission, pt was living w/ spouse and reports: per pt independent w/ ADLs, independent w/ functional transfers and mobility w/ no AD, independent w/ IADLs, driving  Upon evaluation: Pt requires supervision bed mobility, supervision functional transfers, assist x1 functional mobility w/ assist chest tube and O2 line management, supervision-min assist LB ADLs, setup UB ADLs, supervision toileting 2* the following deficits impacting occupational performance: decreased strength and endurance, dyspnea on exertion (87-93% on 2L O2 w/ cues for proper breathing techniques), impaired balance, impaired activity tolerance  Pt receptive to [facility] techniques of pacing self, planning ahead, taking rest breaks and proper breathing techniques  Pt to benefit from continued skilled OT tx while in the hospital to address deficits as defined above and maximize level of functional independence w ADL's and functional mobility  Occupational Performance areas to address include: grooming, bathing/shower, toilet hygiene, dressing, health maintenance, functional mobility, clothing management, cleaning and meal prep, EC education  From OT standpoint, recommendation at time of d/c would be home w/ family support and possible outpatient therapy for pulmonary  The patient's raw score on the AM-PAC Daily Activity Inpatient Short Form is 20  A raw score of greater than or equal to 19 suggests the patient may benefit from discharge to home  Please refer to the recommendation of the Occupational Therapist for safe discharge planning     Goals   Patient Goals \"get better\"   LTG Time Frame 10-14   Long Term Goal please see below goals   Plan   Treatment Interventions ADL retraining;Functional " transfer training;UE strengthening/ROM; Endurance training;Patient/family training;Cognitive reorientation;Equipment evaluation/education; Compensatory technique education; Energy conservation; Activityengagement   Goal Expiration Date 05/23/23   OT Frequency 1-2x/wk   Recommendation   OT Discharge Recommendation Home with outpatient rehabilitation  (pulmonary therapy ?, family support)   AM-PAC Daily Activity Inpatient   Lower Body Dressing 3   Bathing 3   Toileting 3   Upper Body Dressing 3   Grooming 4   Eating 4   Daily Activity Raw Score 20   Daily Activity Standardized Score (Calc for Raw Score >=11) 42 03   AM-PAC Applied Cognition Inpatient   Following a Speech/Presentation 4   Understanding Ordinary Conversation 4   Taking Medications 4   Remembering Where Things Are Placed or Put Away 4   Remembering List of 4-5 Errands 4   Taking Care of Complicated Tasks 4   Applied Cognition Raw Score 24   Applied Cognition Standardized Score 62 21   Modified Malik Scale   Modified McNairy Scale 2   End of Consult   Education Provided Yes   Patient Position at End of Consult All needs within reach; Bedside chair   Nurse Communication Nurse aware of consult     Occupational Therapy Goals to be met in 7-10 days:  1) Pt will improve activity tolerance to G for min 30 min txment sessions  2) Pt will complete ADLs/self care w/ mod I   3) Pt will complete toileting w/ mod I w/ G hygiene/thoroughness using DME PRN  4) Pt will improve functional transfers on/off all surfaces using DME PRN w/ G balance/safety including toileting w/ mod I  5) Pt will improve fx'l mobility during I/ADl/leisure tasks using DME PRN w/ g balance/safety w/ mod I  6) Pt will engage in depression screen/leisure interest checklist w/ G participation to monitor s/s depression and ID 3 positive coping strategies to A w/ emotional regulation and management  7) Pt will demonstrate 100% carryover of E C  techniques w/ mod I t/o fx'l I/ADL/leisure tasks w/o cues s/p skilled education  8) Pt will engage in activity configuration activity w/ G participation and mod I to increase time management skills and improve participation in a structured routine to improve overall quality of life    Documentation completed by: Maine Cameron MS, OTR/L

## 2023-05-09 NOTE — PROGRESS NOTES
"Progress Note -Interventional Radiology NP  Branden Aviles 68 y o  male MRN: [id number]  Unit/Bed#: E4 -01 Encounter: [id number]    Assessment/Plan:    68year old male with outpatient right upper lung nodule biopsy on 5/8/23, after 2 core samples patient developed a pneumothorax  The biopsy was aborted and an 8f anterior chest tube was placed  CT chest from this morning demonstrates subcutaneous emphysema and small residual pneumothorax on the right  Patient evaluated bedside and chest tube advanced further into the chest wall - no further air leak present  Will attempt to remove tube today, if no air leak still at approximately 1100, will place on water seal and get another chest xray 2 hours post water seal placement  - hopeful placement of chest tube to water seal later today and then eventually remove tube  - pulm also following, appreciate input if difficulty removing tube     Subjective: Branden Aviles is a 68 y o  male who presented with outpatient lung biopsy with pneumothorax during the procedure  Patient denies pain, eating and drinking, no other difficulties  Explained plan and verbalized understanding        Patient Active Problem List   Diagnosis   • Atherosclerosis of native arteries of extremities with intermittent claudication, bilateral legs (HCC)   • Lung nodules   • Hypertension   • History of prostate cancer   • Acute blood loss anemia   • Tobacco abuse   • Acute kidney failure (HCC)   • Hypotension   • Shortness of breath on exertion   • Colon cancer screening   • Moderate aortic valve stenosis   • Stage 3 chronic kidney disease (HCC)   • BPH with urinary obstruction   • Bladder neck contracture   • Cancer (HCC)   • GI bleed   • Aorto-iliac disease (HCC)   • Pneumothorax          Objective:    Vitals:  /71 (BP Location: Right arm)   Pulse 76   Temp 98 °F (36 7 °C) (Temporal)   Resp 17   Ht 5' 6\" (1 676 m)   Wt 67 8 kg (149 lb 7 6 oz)   SpO2 90%   BMI 24 13 kg/m²   Body " mass index is 24 13 kg/m²  Weight (last 2 days)     Date/Time Weight    05/08/23 0659 67 8 (149 47)          I/Os:    Intake/Output Summary (Last 24 hours) at 5/9/2023 1012  Last data filed at 5/9/2023 0501  Gross per 24 hour   Intake 832 5 ml   Output 600 ml   Net 232 5 ml       Invasive Devices     Peripheral Intravenous Line  Duration           Peripheral IV 05/08/23 Right Hand 1 day          Drain  Duration           Chest Tube 1 Right Pleural 8 5 Fr  1 day                Physical Exam:  Physical Exam  Vitals and nursing note reviewed  Constitutional:       General: He is not in acute distress  Appearance: He is well-developed  Interventions: Nasal cannula in place  HENT:      Head: Normocephalic and atraumatic  Eyes:      Conjunctiva/sclera: Conjunctivae normal    Cardiovascular:      Rate and Rhythm: Normal rate and regular rhythm  Heart sounds: No murmur heard  Pulmonary:      Effort: Pulmonary effort is normal  No respiratory distress  Breath sounds: Decreased air movement present  Examination of the right-upper field reveals wheezing  Examination of the left-upper field reveals wheezing  Wheezing present  Comments: Right anterior chest tube  Abdominal:      Palpations: Abdomen is soft  Tenderness: There is no abdominal tenderness  Musculoskeletal:         General: No swelling  Cervical back: Neck supple  Skin:     General: Skin is warm and dry  Capillary Refill: Capillary refill takes less than 2 seconds  Neurological:      Mental Status: He is alert  Psychiatric:         Mood and Affect: Mood normal          Speech: Speech normal          Behavior: Behavior normal  Behavior is cooperative                        Lab Results and Cultures:   CBC with diff:   Lab Results   Component Value Date    WBC 13 18 (H) 05/09/2023    HGB 11 1 (L) 05/09/2023    HCT 35 2 (L) 05/09/2023    MCV 94 05/09/2023     05/09/2023    MCH 29 6 05/09/2023    MCHC 31 5 05/09/2023    RDW 13 4 05/09/2023    MPV 9 7 05/09/2023    NRBC 0 05/09/2023      BMP/CMP:  Lab Results   Component Value Date    K 4 4 05/09/2023     (H) 05/09/2023    CO2 18 (L) 05/09/2023    BUN 22 05/09/2023    CREATININE 1 27 05/09/2023    CALCIUM 8 8 05/09/2023    AST 10 (L) 05/09/2023    ALT 6 (L) 05/09/2023    ALKPHOS 76 05/09/2023    EGFR 55 05/09/2023   ,     Coags:   Lab Results   Component Value Date    PTT 32 02/26/2023    INR 1 05 02/26/2023   ,          Lipid Panel: No results found for: CHOL  No results found for: HDL  No results found for: HDL  No results found for: LDLCALC  No results found for: TRIG    HgbA1c: No results found for: HGBA1C    Blood Culture: No results found for: BLOODCX,   Urinalysis:   Lab Results   Component Value Date    COLORU yellow 08/18/2022    COLORU Yellow 05/04/2016    CLARITYU clear 08/18/2022    CLARITYU Cloudy 05/04/2016    SPECGRAV >=1 030 05/04/2016    PHUR 5 0 05/04/2016    LEUKOCYTESUR neg 08/18/2022    LEUKOCYTESUR Negative 05/04/2016    NITRITE neg 08/18/2022    NITRITE Negative 05/04/2016    GLUCOSEU neg 08/18/2022    GLUCOSEU Negative 05/04/2016    KETONESU neg 08/18/2022    KETONESU Negative 05/04/2016    BILIRUBINUR neg 08/18/2022    BILIRUBINUR Small (A) 05/04/2016    BLOODU neg 08/18/2022    BLOODU Small (A) 05/04/2016   ,   Urine Culture:   Lab Results   Component Value Date    URINECX 20,000-29,000 cfu/ml Klebsiella pneumoniae (A) 09/03/2022    URINECX (A) 09/03/2022     20,000-29,000 cfu/ml Alpha Hemolytic Streptococcus NOT Enterococcus   ,   Wound Culure:  No results found for: WOUNDCULT    VTE Pharmacologic Prophylaxis: Sequential compression device (Venodyne)       Thank you for allowing me to participate in the care of Mira Beckford  Please don't hesitate to call, text, email, or TigerText with any questions  This text is generated with voice recognition software  There may be translation, syntax,  or grammatical errors   If you have any questions, please contact the dictating provider

## 2023-05-09 NOTE — PROGRESS NOTES
[address] [place] [address]  Progress Note  Name: Rolando Shah  MRN: [id number]  Unit/Bed#: E4 -01 I Date of Admission: 5/8/2023   Date of Service: 5/9/2023 I Hospital Day: 1    Assessment/Plan   * Pneumothorax  Assessment & Plan  77-year-old male presented to institution for his elective CT-guided right upper lung lobe/nodule biopsy  Procedure complicated with a small pneumothorax  He is status post CT-guided right anterior 8 [place] chest tube placement  · Pulmonary consulted for assistance in the management of his chest tube however also appears that IR has evaluated and they plan on removing chest tube if no air leak present at 11 AM  Plan is to place on water seal and repeat chest xray in 2 hrs   · Continue oxygen support- currently on 2 L    Stage 3 chronic kidney disease [facility])  Assessment & Plan  Lab Results   Component Value Date    EGFR 55 05/09/2023    EGFR 53 05/08/2023    EGFR 56 03/01/2023    CREATININE 1 27 05/09/2023    CREATININE 1 31 (H) 05/08/2023    CREATININE 1 26 03/01/2023   · History of CKD  Monitor inpatient  · Baseline around 1 2-1 3    Tobacco abuse  Assessment & Plan  · Still currently smoking    History of prostate cancer  Assessment & Plan  · History of prostate cancer, status post radical prostatectomy in 2005    Hypertension  Assessment & Plan  · Reports taking losartan 100 mg daily  · BP here 159/71, 156/63    Lung nodules  Assessment & Plan  · Lung screen showed a 1 3 cm right upper lobe spiculated nodule s/p biopsy 9/7/30  · Complicated by pneumothorax  Atherosclerosis of native arteries of extremities with intermittent claudication, bilateral legs (HCC)  Assessment & Plan  Aortoiliac and infrainguinal arterial occlusive disease with bilateral calf claudication  However, per outpatient provider note he is unable to tolerate antiplatelet therapy due to GI bleed and has been intolerant to Pletal   He also reports a metal allergy    · Continue outpatient follow-up with vascular           VTE Pharmacologic Prophylaxis:   Pharmacologic  Mechanical VTE Prophylaxis in Place: Yes    Discharge Plan: With need for continued inpatient stay for chest tube    Discussions with Specialists or Other Care Team Provider: Nursing    Education and Discussions with Family / Patient: Patient    Time Spent for Care: 45 minutes  This time was spent on one or more of the following: performing physical exam; counseling and coordination of care; obtaining or reviewing history; documenting in the medical record; reviewing/ordering tests, medications, or procedures; communicating with other healthcare professionals and discussing with patient's family/caregivers  Current Length of Stay: 1 day(s)  Current Patient Status: Inpatient   Code Status: Level 1 - Full Code    Subjective:   Resting in chair at bedside  Patient had CT chest performed this morning  Issue with air leak but this was inserted for other and hoping for chest tube removal later today  Objective:     Vitals:   Temp (24hrs), Av 7 °F (36 5 °C), Min:97 5 °F (36 4 °C), Max:98 °F (36 7 °C)    Temp:  [97 5 °F (36 4 °C)-98 °F (36 7 °C)] 98 °F (36 7 °C)  HR:  [60-76] 76  Resp:  [17-24] 17  BP: (149-178)/(63-83) 159/71  SpO2:  [90 %-97 %] 90 %  Body mass index is 24 13 kg/m²  Input and Output Summary (last 24 hours): Intake/Output Summary (Last 24 hours) at 2023 1038  Last data filed at 2023 0501  Gross per 24 hour   Intake 832 5 ml   Output 600 ml   Net 232 5 ml       Physical Exam:     Physical Exam  Vitals and nursing note reviewed  Constitutional:       General: He is not in acute distress  Appearance: Normal appearance  He is normal weight  He is not ill-appearing, toxic-appearing or diaphoretic  HENT:      Head: Normocephalic and atraumatic  Eyes:      General: No scleral icterus  Cardiovascular:      Rate and Rhythm: Normal rate and regular rhythm     Pulmonary:      Effort: Pulmonary effort is normal  No respiratory distress  Breath sounds: Normal breath sounds  No stridor  No wheezing or rhonchi  Comments: On 2 L oxygen nasal cannula  Chest tube in place  Abdominal:      General: Bowel sounds are normal  There is no distension  Palpations: Abdomen is soft  There is no mass  Tenderness: There is no abdominal tenderness  Hernia: No hernia is present  Musculoskeletal:         General: No swelling  Cervical back: Neck supple  Skin:     General: Skin is warm and dry  Neurological:      Mental Status: He is alert and oriented to person, place, and time  Mental status is at baseline  Psychiatric:         Mood and Affect: Mood normal          Behavior: Behavior normal          Additional Data:     Labs:    Results from last 7 days   Lab Units 05/09/23  0437   WBC Thousand/uL 13 18*   HEMOGLOBIN g/dL 11 1*   HEMATOCRIT % 35 2*   PLATELETS Thousands/uL 272   NEUTROS PCT % 79*   LYMPHS PCT % 14   MONOS PCT % 6   EOS PCT % 1     Results from last 7 days   Lab Units 05/09/23  0437   POTASSIUM mmol/L 4 4   CHLORIDE mmol/L 109*   CO2 mmol/L 18*   BUN mg/dL 22   CREATININE mg/dL 1 27   CALCIUM mg/dL 8 8   ALK PHOS U/L 76   ALT U/L 6*   AST U/L 10*           * I Have Reviewed All Lab Data Listed Above  * Additional Pertinent Lab Tests Reviewed:  Ele 66 Admission Reviewed    Imaging:    Imaging Reports Reviewed Today Include: ct chest  Imaging Personally Reviewed by Myself Includes:      Recent Cultures (last 7 days):           Lines/Drains:  Invasive Devices     Peripheral Intravenous Line  Duration           Peripheral IV 05/08/23 Right Hand 1 day          Drain  Duration           Chest Tube 1 Right Pleural 8 5 Fr  1 day                Last 24 Hours Medication List:   Current Facility-Administered Medications   Medication Dose Route Frequency Provider Last Rate   • albuterol  2 puff Inhalation Q4H PRN Mdaison Millan MD     • ferrous sulfate  325 mg Oral HS Jonna Moraes MD     • losartan  100 mg Oral Daily Jonna Moraes MD     • nicotine  1 patch Transdermal Daily Jonna Moraes MD     • oxyCODONE  5 mg Oral Q4H PRN Gerri Quach MD     • pantoprazole  40 mg Oral Early Morning Jonna Moraes MD     • pravastatin  20 mg Oral Daily With Kylah Gutierres MD     • sodium chloride  76 mL/hr Intravenous Continuous Tank Bains DO 76 mL/hr (05/09/23 0935)        Today, Patient Was Seen By: Trish Field PA-C    ** Please Note: This note has been constructed using a voice recognition system   **

## 2023-05-09 NOTE — ASSESSMENT & PLAN NOTE
Lab Results   Component Value Date    EGFR 55 05/09/2023    EGFR 53 05/08/2023    EGFR 56 03/01/2023    CREATININE 1 27 05/09/2023    CREATININE 1 31 (H) 05/08/2023    CREATININE 1 26 03/01/2023   · History of CKD  Monitor inpatient    · Baseline around 1 2-1 3

## 2023-05-09 NOTE — CONSULTS
"Consultation - Pulmonary Medicine   Janet Peña 68 y o  male MRN: [id number]  Unit/Bed#: E4 -01 Encounter: [id number]      Assessment/Plan:    1  Pneumothorax  - S/p IR right upper lung nodule biopsy on 5/8/2023  -Chest tube placed by IR with reexpansion of lung-- placed to waterseal this morning and repeat chest x-ray demonstrated collapse  -Patient placed back on -22cmE9L wall suction, will repeat chest x-ray in the morning and again attempt to waterseal  - IR following    2  Lung nodules  -[facility]  -Status post biopsy 5/8/2023 for right upper lobe spiculated nodule    3  COPD without acute exacerbation  -Currently on albuterol HFA as needed outpatient  -Patient states he only uses this 2-3 times per month  -He will like to continue following at the [facility] outpatient    4  Tobacco abuse  -Currently 1 pack/day smoker  - NRT per primary team   -Currently try to cut down    History of Present Illness   Physician Requesting Consult: Qiana Hemphill DO  Reason for Consult / Principal Problem: Pneumothorax   Hx and PE limited by: None  Chief Complaint: \"I'm having some mild discomfort  \"  HPI: Janet Peña is a 68 y o  male male who presented [facility] [place] [address]  for elective pulmonary nodule biopsy on 5/8/2023  After 2 core samples in IR, patient developed a pneumothorax  Biopsy was aborted and a chest tube was inserted by IR  Patient was admitted to the hospital for pneumothorax  Pulmonary was consulted for large pneumothorax and lung nodules  He follows with the [facility] outpatient  There is evidence of obstruction on PFTs completed in 2021  He is currently a 1 pack/day smoker and has been smoking since he was 15years old  At times he was smoking up to 2 packs/day  He has a history of being in the [place] and was exposed to agent orange  He has history of prostate cancer 2005 which she had resection  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    He " has 1 cat at home  He has a history of asthma as a child  He is not maintained on any maintenance inhalers  He states he uses albuterol HFA 2-3 times per month  Consults    Review of Systems   Constitutional: Negative for activity change, chills, fatigue and fever  HENT: Negative for congestion  Respiratory: Positive for chest tightness (slight) and shortness of breath (slight)  Negative for cough and choking  Cardiovascular: Negative for chest pain, palpitations and leg swelling  All other systems reviewed and are negative  Historical Information   Past Medical History:   Diagnosis Date   • Anemia    • Arthritis    • Cancer [facility])     prostate   • COPD (chronic obstructive pulmonary disease) ([facility] [address] )    • History of transfusion 2005    auto transfusion   • Hyperlipidemia    • Hypertension    • Leaky heart valve    • Lung nodules     unsure of dx   • PVD (peripheral vascular disease) (HCC)    • Tobacco abuse    • UTI (urinary tract infection)      Past Surgical History:   Procedure Laterality Date   • COLONOSCOPY     • HERNIA REPAIR      with mesh   • IR BIOPSY LUNG  5/8/2023   • WA CYSTO CALIBRATION DILAT URTL STRIX/STENOSIS N/A 9/13/2022    Procedure: DILATATION URETHRAL, incision bladder neck contracture,cysto;   Surgeon: Kristian Ariza MD;  Location[facility];  Service: Urology   • WA TRANSURETHRAL RESECTION BLADDER NECK N/A 3/8/2022    Procedure: RESECTION TRANSURETHRAL BLADDER NECK CONTRACTURE;  Surgeon: Kristian Ariza MD;  Location: [facility];  Service: Urology   • PROSTATECTOMY     • TONSILLECTOMY       Social History   Social History     Substance and Sexual Activity   Alcohol Use Yes   • Alcohol/week: 6 0 standard drinks   • Types: 6 Cans of beer per week     Social History     Substance and Sexual Activity   Drug Use Never     Social History     Tobacco Use   Smoking Status Every Day   • Packs/day: 1 00   • Years: 60 00   • Pack years: 60 00   • Types: Cigarettes   Smokeless Tobacco Never "  Tobacco Comments    One cigarette at 0400 5/8     E-Cigarette/Vaping   • E-Cigarette Use Never User      E-Cigarette/Vaping Substances   • Nicotine No    • THC No    • CBD No    • Flavoring No    • Other No    • Unknown No      Occupational History: retired, history of construction work including plumbing and   Also has a history of being in the [place] with exposure to agent orange    Family History: History reviewed  No pertinent family history  Meds/Allergies   all current active meds have been reviewed and pertinent pulmonary meds have been reviewed    Allergies   Allergen Reactions   • Aspirin Other (See Comments)     Polyps in sinuses    • Nitrofurantoin GI Intolerance     Stomach cramping    • Sm Non-Asprin Sinus [Pseudoephedrine-Acetaminophen] Other (See Comments)     Patient advised to avoid due to polyps   • Ciprofloxacin Tachycardia   • Penicillins Rash   • Rosuvastatin Myalgia   • Sulfa Antibiotics Rash       Objective   Vitals: Blood pressure 157/67, pulse 68, temperature 99 3 °F (37 4 °C), temperature source Temporal, resp  rate 18, height 5' 6\" (1 676 m), weight 67 8 kg (149 lb 7 6 oz), SpO2 96 %  2L NC,Body mass index is 24 13 kg/m²  Intake/Output Summary (Last 24 hours) at 5/9/2023 1510  Last data filed at 5/9/2023 0501  Gross per 24 hour   Intake 832 5 ml   Output 600 ml   Net 232 5 ml     Invasive Devices     Peripheral Intravenous Line  Duration           Peripheral IV 05/08/23 Right Hand 1 day          Drain  Duration           Chest Tube 1 Right Pleural 8 5 Fr  1 day                Physical Exam  Vitals reviewed  Constitutional:       General: He is not in acute distress  Appearance: He is not ill-appearing or toxic-appearing  Interventions: Nasal cannula in place  HENT:      Head: Normocephalic and atraumatic  Nose: Nose normal       Mouth/Throat:      Pharynx: Oropharynx is clear     Eyes:      Conjunctiva/sclera: Conjunctivae normal  " Cardiovascular:      Rate and Rhythm: Normal rate and regular rhythm  Heart sounds: Normal heart sounds  Pulmonary:      Effort: Pulmonary effort is normal  No tachypnea, accessory muscle usage, respiratory distress or retractions  Breath sounds: Decreased air movement (anteriorly on right side) present  No stridor  Examination of the right-upper field reveals decreased breath sounds  Examination of the right-middle field reveals decreased breath sounds  Decreased breath sounds present  No wheezing, rhonchi or rales  Chest:      Chest wall: Tenderness (right sided) present  Abdominal:      Palpations: Abdomen is soft  Musculoskeletal:         General: Normal range of motion  Cervical back: Normal range of motion  Right lower leg: No edema  Left lower leg: No edema  Skin:     General: Skin is warm and dry  Coloration: Skin is pale  Neurological:      General: No focal deficit present  Mental Status: He is alert and oriented to person, place, and time  Psychiatric:         Mood and Affect: Mood normal          Behavior: Behavior normal          Lab Results:   I have personally reviewed pertinent lab results  , CBC:   Lab Results   Component Value Date    WBC 13 18 (H) 05/09/2023    HGB 11 1 (L) 05/09/2023    HCT 35 2 (L) 05/09/2023    MCV 94 05/09/2023     05/09/2023    MCH 29 6 05/09/2023    MCHC 31 5 05/09/2023    RDW 13 4 05/09/2023    MPV 9 7 05/09/2023    NRBC 0 05/09/2023   , CMP:   Lab Results   Component Value Date    SODIUM 135 05/09/2023    K 4 4 05/09/2023     (H) 05/09/2023    CO2 18 (L) 05/09/2023    BUN 22 05/09/2023    CREATININE 1 27 05/09/2023    CALCIUM 8 8 05/09/2023    AST 10 (L) 05/09/2023    ALT 6 (L) 05/09/2023    ALKPHOS 76 05/09/2023    EGFR 55 05/09/2023       Imaging Studies: I have personally reviewed pertinent reports     and I have personally reviewed pertinent films in PACS     5/9/23 CXR: Right lung collapse--awaiting "official radiology report    5/9/23 CT chest: Interval right chest tube placement residual small pneumothorax and moderate amount of subcutaneous emphysema in the anterior chest wall adjacent to the chest tube  Spiculated mass in the right upper lobe is slightly larger than on prior study likely related to a small amount of peritumoral given the recent image guided biopsy yesterday  Mass remain suspicious for neoplastic process/bronchogenic carcinoma  Right lower lobe nodule is redemonstrated largest measuring 8 mm unchanged in 2019  Emphysema  EKG, Pathology, and Other Studies: I have personally reviewed pertinent reports  Echo 4/11/23: Mild left ventricular concentric hypertrophy  LVEF 71%  Diastolic function is mildly abnormal, consistent with grade 1 abnormal relaxation  Pulmonary Results (PFTs, PSG): I have personally reviewed pertinent reports  Pulmonary function testing:  Performed 2/10/21 and personally interpreted  FEV1/FVC ratio 67%   FEV1 94% predicted  % predicted  No significant response to bronchodilators   % predicted  DLCO corrected for hemoglobin is normal   FEV1/FVC ratio is below 70% which represents an obstruction  VTE Prophylaxis: Sequential compression device (Venodyne)     Code Status: Level 1 - Full Code    Portions of the record may have been created with voice recognition software  Occasional wrong word or \"sound a like\" substitutions may have occurred due to the inherent limitations of voice recognition software  Read the chart carefully and recognize, using context, where substitutions have occurred    "

## 2023-05-09 NOTE — QUICK NOTE
IR Quick Note:    Chest tube placed to water seal after confirming no residual air leak in atrium  Repeat chest x-ray around 1300 to evaluate for pneumothorax       [address] [place] [address] Isacc Babin

## 2023-05-09 NOTE — PLAN OF CARE
Problem: MOBILITY - ADULT  Goal: Maintain or return to baseline ADL function  Description: INTERVENTIONS:  -  Assess patient's ability to carry out ADLs; assess patient's baseline for ADL function and identify physical deficits which impact ability to perform ADLs (bathing, care of mouth/teeth, toileting, grooming, dressing, etc )  - Assess/evaluate cause of self-care deficits   - Assess range of motion  - Assess patient's mobility; develop plan if impaired  - Assess patient's need for assistive devices and provide as appropriate  - Encourage maximum independence but intervene and supervise when necessary  - Involve family in performance of ADLs  - Assess for home care needs following discharge   - Consider OT consult to assist with ADL evaluation and planning for discharge  - Provide patient education as appropriate  Outcome: Progressing  Goal: Maintains/Returns to pre admission functional level  Description: INTERVENTIONS:  - Perform BMAT or MOVE assessment daily    - Set and communicate daily mobility goal to care team and patient/family/caregiver     - Collaborate with rehabilitation services on mobility goals if consulted    - Record patient progress and toleration of activity level   Outcome: Progressing

## 2023-05-09 NOTE — ASSESSMENT & PLAN NOTE
79-year-old male presented to institution for his elective CT-guided right upper lung lobe/nodule biopsy  Procedure complicated with a small pneumothorax  He is status post CT-guided right anterior 8 [place] chest tube placement    · Pulmonary consulted for assistance in the management of his chest tube however also appears that IR has evaluated and they plan on removing chest tube if no air leak present at 11 AM  Plan is to place on water seal and repeat chest xray in 2 hrs   · Continue oxygen support- currently on 2 L

## 2023-05-10 ENCOUNTER — APPOINTMENT (INPATIENT)
Dept: CT IMAGING | Facility: HOSPITAL | Age: 73
End: 2023-05-10

## 2023-05-10 PROBLEM — E83.42 HYPOMAGNESEMIA: Status: ACTIVE | Noted: 2023-05-10

## 2023-05-10 LAB
ALBUMIN SERPL BCP-MCNC: 3.5 G/DL (ref 3.5–5)
ALP SERPL-CCNC: 78 U/L (ref 34–104)
ALT SERPL W P-5'-P-CCNC: 5 U/L (ref 7–52)
ANION GAP SERPL CALCULATED.3IONS-SCNC: 7 MMOL/L (ref 4–13)
AST SERPL W P-5'-P-CCNC: 11 U/L (ref 13–39)
BASOPHILS # BLD AUTO: 0.04 THOUSANDS/ÂΜL (ref 0–0.1)
BASOPHILS NFR BLD AUTO: 0 % (ref 0–1)
BILIRUB SERPL-MCNC: 0.36 MG/DL (ref 0.2–1)
BUN SERPL-MCNC: 23 MG/DL (ref 5–25)
CALCIUM SERPL-MCNC: 8.9 MG/DL (ref 8.4–10.2)
CHLORIDE SERPL-SCNC: 109 MMOL/L (ref 96–108)
CO2 SERPL-SCNC: 20 MMOL/L (ref 21–32)
CREAT SERPL-MCNC: 1.33 MG/DL (ref 0.6–1.3)
EOSINOPHIL # BLD AUTO: 0.17 THOUSAND/ÂΜL (ref 0–0.61)
EOSINOPHIL NFR BLD AUTO: 1 % (ref 0–6)
ERYTHROCYTE [DISTWIDTH] IN BLOOD BY AUTOMATED COUNT: 13.3 % (ref 11.6–15.1)
GFR SERPL CREATININE-BSD FRML MDRD: 52 ML/MIN/1.73SQ M
GLUCOSE SERPL-MCNC: 88 MG/DL (ref 65–140)
HCT VFR BLD AUTO: 33.9 % (ref 36.5–49.3)
HGB BLD-MCNC: 10.9 G/DL (ref 12–17)
IMM GRANULOCYTES # BLD AUTO: 0.05 THOUSAND/UL (ref 0–0.2)
IMM GRANULOCYTES NFR BLD AUTO: 0 % (ref 0–2)
LYMPHOCYTES # BLD AUTO: 2.05 THOUSANDS/ÂΜL (ref 0.6–4.47)
LYMPHOCYTES NFR BLD AUTO: 17 % (ref 14–44)
MAGNESIUM SERPL-MCNC: 1.8 MG/DL (ref 1.9–2.7)
MCH RBC QN AUTO: 29.8 PG (ref 26.8–34.3)
MCHC RBC AUTO-ENTMCNC: 32.2 G/DL (ref 31.4–37.4)
MCV RBC AUTO: 93 FL (ref 82–98)
MONOCYTES # BLD AUTO: 0.86 THOUSAND/ÂΜL (ref 0.17–1.22)
MONOCYTES NFR BLD AUTO: 7 % (ref 4–12)
NEUTROPHILS # BLD AUTO: 9.25 THOUSANDS/ÂΜL (ref 1.85–7.62)
NEUTS SEG NFR BLD AUTO: 75 % (ref 43–75)
NRBC BLD AUTO-RTO: 0 /100 WBCS
PLATELET # BLD AUTO: 269 THOUSANDS/UL (ref 149–390)
PMV BLD AUTO: 9.8 FL (ref 8.9–12.7)
POTASSIUM SERPL-SCNC: 4.2 MMOL/L (ref 3.5–5.3)
PROT SERPL-MCNC: 6.6 G/DL (ref 6.4–8.4)
RBC # BLD AUTO: 3.66 MILLION/UL (ref 3.88–5.62)
SODIUM SERPL-SCNC: 136 MMOL/L (ref 135–147)
WBC # BLD AUTO: 12.42 THOUSAND/UL (ref 4.31–10.16)

## 2023-05-10 RX ORDER — MAGNESIUM SULFATE HEPTAHYDRATE 40 MG/ML
2 INJECTION, SOLUTION INTRAVENOUS ONCE
Status: COMPLETED | OUTPATIENT
Start: 2023-05-10 | End: 2023-05-10

## 2023-05-10 RX ORDER — SODIUM CHLORIDE 9 MG/ML
76 INJECTION, SOLUTION INTRAVENOUS CONTINUOUS
Status: DISPENSED | OUTPATIENT
Start: 2023-05-10 | End: 2023-05-11

## 2023-05-10 RX ADMIN — LOSARTAN POTASSIUM 100 MG: 50 TABLET, FILM COATED ORAL at 09:42

## 2023-05-10 RX ADMIN — PRAVASTATIN SODIUM 20 MG: 20 TABLET ORAL at 17:45

## 2023-05-10 RX ADMIN — SODIUM CHLORIDE 76 ML/HR: 0.9 INJECTION, SOLUTION INTRAVENOUS at 20:25

## 2023-05-10 RX ADMIN — IOHEXOL 100 ML: 350 INJECTION, SOLUTION INTRAVENOUS at 21:29

## 2023-05-10 RX ADMIN — PANTOPRAZOLE SODIUM 40 MG: 40 TABLET, DELAYED RELEASE ORAL at 05:10

## 2023-05-10 RX ADMIN — FERROUS SULFATE TAB 325 MG (65 MG ELEMENTAL FE) 325 MG: 325 (65 FE) TAB at 20:24

## 2023-05-10 RX ADMIN — MAGNESIUM SULFATE HEPTAHYDRATE 2 G: 40 INJECTION, SOLUTION INTRAVENOUS at 09:42

## 2023-05-10 NOTE — PLAN OF CARE
Problem: MOBILITY - ADULT  Goal: Maintain or return to baseline ADL function  Description: INTERVENTIONS:  -  Assess patient's ability to carry out ADLs; assess patient's baseline for ADL function and identify physical deficits which impact ability to perform ADLs (bathing, care of mouth/teeth, toileting, grooming, dressing, etc )  - Assess/evaluate cause of self-care deficits   - Assess range of motion  - Assess patient's mobility; develop plan if impaired  - Assess patient's need for assistive devices and provide as appropriate  - Encourage maximum independence but intervene and supervise when necessary  - Involve family in performance of ADLs  - Assess for home care needs following discharge   - Consider OT consult to assist with ADL evaluation and planning for discharge  - Provide patient education as appropriate  Outcome: Progressing  Goal: Maintains/Returns to pre admission functional level  Description: INTERVENTIONS:  - Perform BMAT or MOVE assessment daily    - Set and communicate daily mobility goal to care team and patient/family/caregiver     - Collaborate with rehabilitation services on mobility goals if consulted  - Stand patient 3 times a day  - Ambulate patient 3 times a day  - Out of bed to chair 3 times a day   - Out of bed for meals 3  Problem: Prexisting or High Potential for Compromised Skin Integrity  Goal: Skin integrity is maintained or improved  Description: INTERVENTIONS:  - Identify patients at risk for skin breakdown  - Assess and monitor skin integrity  - Assess and monitor nutrition and hydration status  - Monitor labs   - Assess for incontinence   - Turn and reposition patient  - Assist with mobility/ambulation  - Relieve pressure over bony prominences  - Avoid friction and shearing  - Provide appropriate hygiene as needed including keeping skin clean and dry  - Evaluate need for skin moisturizer/barrier cream  - Collaborate with interdisciplinary team   - Patient/family teaching  - Consider wound care consult   Outcome: Progressing     Problem: PAIN - ADULT  Goal: Verbalizes/displays adequate comfort level or baseline comfort level  Description: Interventions:  - Encourage patient to monitor pain and request assistance  - Assess pain using appropriate pain scale  - Administer analgesics based on type and severity of pain and evaluate response  - Implement non-pharmacological measures as appropriate and evaluate response  - Consider cultural and social influences on pain and pain management  - Notify physician/advanced practitioner if interventions unsuccessful or patient reports new pain  Outcome: Progressing     Problem: INFECTION - ADULT  Goal: Absence or prevention of progression during hospitalization  Description: INTERVENTIONS:  - Assess and monitor for signs and symptoms of infection  - Monitor lab/diagnostic results  - Monitor all insertion sites, i e  indwelling lines, tubes, and drains  - Monitor endotracheal if appropriate and nasal secretions for changes in amount and color  - [place] appropriate cooling/warming therapies per order  - Administer medications as ordered  - Instruct and encourage patient and family to use good hand hygiene technique  - Identify and instruct in appropriate isolation precautions for identified infection/condition  Outcome: Progressing     Problem: DISCHARGE PLANNING  Goal: Discharge to home or other facility with appropriate resources  Description: INTERVENTIONS:  - Identify barriers to discharge w/patient and caregiver  - Arrange for needed discharge resources and transportation as appropriate  - Identify discharge learning needs (meds, wound care, etc )  - Arrange for interpretive services to assist at discharge as needed  - Refer to Case Management Department for coordinating discharge planning if the patient needs post-hospital services based on physician/advanced practitioner order or complex needs related to functional status, cognitive ability, or social support system  Outcome: Progressing     Problem: Knowledge Deficit  Goal: Patient/family/caregiver demonstrates understanding of disease process, treatment plan, medications, and discharge instructions  Description: Complete learning assessment and assess knowledge base    Interventions:  - Provide teaching at level of understanding  - Provide teaching via preferred learning methods  Outcome: Progressing    times a day  - Out of bed for toileting  - Record patient progress and toleration of activity level   Outcome: Progressing

## 2023-05-10 NOTE — ASSESSMENT & PLAN NOTE
75-year-old male presented to institution for his elective CT-guided right upper lung lobe/nodule biopsy  Procedure complicated with a right sided pneumothorax  He is status post CT-guided right anterior 8 [place] chest tube placement    · IR attempted chest tube removal however lung had recollapsed on repeat imaging  · Plan is to trial waterseal again today and check repeat chest x-ray  · Pulmonary also following for management of chest tube  · Continue oxygen support- currently on 2 L

## 2023-05-10 NOTE — PROGRESS NOTES
Progress Note -Interventional Radiology PA  Hoang Robert 68 y o  male MRN: [id number]  Unit/Bed#: E4 -01 Encounter: [id number]    Assessment/Plan:  Patient is a 67 y/o male with outpatient right upper lung nodule biopsy on 5/8/23, after 2 core samples patient developed a pneumothorax  The biopsy was aborted and an 8f anterior chest tube was placed  CT chest from yesterday morning demonstrated subcutaneous emphysema and small residual pneumothorax on the right  Patient evaluated bedside and chest tube advanced further into the chest wall - no further air leak present  Yesterday, trail of water seal, but patient with repeat right pneumothorax, patient was placed back to suction yesterday      -Today repeat morning CXR showed resolution of right pneumothorax  Patient placed to water seal and repeat CXR this afternoon again with no acute findings and no pneumothorax  -Discussed with Dr Bety Ramos and pulmonology  -Right chest tube successfully removed at bedside, intact, with no immediate complications and patient tolerated well  Gauze and Tegaderm applied    -Appreciate pulmonology recommendations  -Remainder of care per primary team  -Please reach out to IR with any questions/concerns    Patient Active Problem List   Diagnosis   • Atherosclerosis of native arteries of extremities with intermittent claudication, bilateral legs ([address] )   • Lung nodules   • Hypertension   • History of prostate cancer   • Acute blood loss anemia   • Tobacco abuse   • Acute kidney failure (HCC)   • Hypotension   • Shortness of breath on exertion   • Colon cancer screening   • Moderate aortic valve stenosis   • Stage 3 chronic kidney disease (HCC)   • BPH with urinary obstruction   • Bladder neck contracture   • Cancer (HCC)   • GI bleed   • Aorto-iliac disease (HCC)   • Pneumothorax   • Hypomagnesemia          Subjective: Hoang Robert is a 68 y o  male with outpatient right upper lung nodule biopsy on 5/8/23, after 2 core samples patient "developed a pneumothorax  The biopsy was aborted and an 8f anterior chest tube was placed  CT chest from yesterday morning demonstrated subcutaneous emphysema and small residual pneumothorax on the right  Patient evaluated bedside and chest tube advanced further into the chest wall - no further air leak present  Yesterday, trail of water seal, but patient with repeat right pneumothorax, patient was placed back to suction yesterday  Today eager to have chest tube removed, but otherwise offered no complaints  Denied any chest pain, SOB, fevers, chills, abdominal pain  Objective:    Vitals:  /68 (BP Location: Right arm)   Pulse 74   Temp 98 4 °F (36 9 °C) (Temporal)   Resp 18   Ht 5' 6\" (1 676 m)   Wt 67 8 kg (149 lb 7 6 oz)   SpO2 96%   BMI 24 13 kg/m²   Body mass index is 24 13 kg/m²  Weight (last 2 days)     Date/Time Weight    05/08/23 0659 67 8 (149 47)          I/Os:    Intake/Output Summary (Last 24 hours) at 5/10/2023 1652  Last data filed at 5/10/2023 0600  Gross per 24 hour   Intake --   Output 660 ml   Net -660 ml       Invasive Devices     Peripheral Intravenous Line  Duration           Peripheral IV 05/08/23 Right Hand 2 days          Drain  Duration           Chest Tube 1 Right Pleural 8 5 Fr  2 days                Physical Exam:  /68 (BP Location: Right arm)   Pulse 74   Temp 98 4 °F (36 9 °C) (Temporal)   Resp 18   Ht 5' 6\" (1 676 m)   Wt 67 8 kg (149 lb 7 6 oz)   SpO2 96%   BMI 24 13 kg/m²   General appearance: alert and oriented, in no acute distress  Head: Normocephalic, without obvious abnormality  Lungs: clear to auscultation bilaterally and on RA, no work of breathing or respiratory distress  Chest wall: Right anterior chest tube in place  No surrounding swelling, erythema noted  No air leak noted  Chest tube removed at bedside on last evaluation in the afternoon    Heart: regular rate and rhythm            Lab Results and Cultures:   CBC with diff:   Lab Results " Component Value Date    WBC 12 42 (H) 05/10/2023    HGB 10 9 (L) 05/10/2023    HCT 33 9 (L) 05/10/2023    MCV 93 05/10/2023     05/10/2023    MCH 29 8 05/10/2023    MCHC 32 2 05/10/2023    RDW 13 3 05/10/2023    MPV 9 8 05/10/2023    NRBC 0 05/10/2023      BMP/CMP:  Lab Results   Component Value Date    K 4 2 05/10/2023     (H) 05/10/2023    CO2 20 (L) 05/10/2023    BUN 23 05/10/2023    CREATININE 1 33 (H) 05/10/2023    CALCIUM 8 9 05/10/2023    AST 11 (L) 05/10/2023    ALT 5 (L) 05/10/2023    ALKPHOS 78 05/10/2023    EGFR 52 05/10/2023   ,     Coags:   Lab Results   Component Value Date    PTT 32 02/26/2023    INR 1 05 02/26/2023   ,          Lipid Panel: No results found for: CHOL  No results found for: HDL  No results found for: HDL  No results found for: LDLCALC  No results found for: TRIG    HgbA1c: No results found for: HGBA1C    Blood Culture: No results found for: BLOODCX,   Urinalysis:   Lab Results   Component Value Date    COLORU yellow 08/18/2022    COLORU Yellow 05/04/2016    CLARITYU clear 08/18/2022    CLARITYU Cloudy 05/04/2016    SPECGRAV >=1 030 05/04/2016    PHUR 5 0 05/04/2016    LEUKOCYTESUR neg 08/18/2022    LEUKOCYTESUR Negative 05/04/2016    NITRITE neg 08/18/2022    NITRITE Negative 05/04/2016    GLUCOSEU neg 08/18/2022    GLUCOSEU Negative 05/04/2016    KETONESU neg 08/18/2022    KETONESU Negative 05/04/2016    BILIRUBINUR neg 08/18/2022    BILIRUBINUR Small (A) 05/04/2016    BLOODU neg 08/18/2022    BLOODU Small (A) 05/04/2016   ,   Urine Culture:   Lab Results   Component Value Date    URINECX 20,000-29,000 cfu/ml Klebsiella pneumoniae (A) 09/03/2022    URINECX (A) 09/03/2022     20,000-29,000 cfu/ml Alpha Hemolytic Streptococcus NOT Enterococcus   ,   Wound Culure:  No results found for: WOUNDCULT      Thank you for allowing me to participate in the care of Richard Worthington  Please don't hesitate to call, text, email, or TigerText with any questions       This text is generated with voice recognition software  There may be translation, syntax,  or grammatical errors  If you have any questions, please contact the dictating provider

## 2023-05-10 NOTE — QUICK NOTE
Patient's chest tube remained to suction overnight and this morning  Repeat CXR this morning with resolution right pneumothorax  Chest tube placed to water seal at 10:25AM  Repeat CXR ordered for 13:30, 3 hours from now to reassess for repeat pneumothorax  We will reassess after CXR        Toney Singh PA-C

## 2023-05-10 NOTE — ASSESSMENT & PLAN NOTE
· Still currently smoking  · Discussed smoking cessation - unsure if he wants to quit  · Nicotine patch in place

## 2023-05-10 NOTE — ASSESSMENT & PLAN NOTE
61-year-old male presented to institution for his elective CT-guided right upper lung lobe/nodule biopsy on 5/8/23  Procedure complicated with a right sided pneumothorax     · Had a CT-guided right anterior 8 [place] chest tube placed  · IR attempted chest tube removal however lung had recollapsed on repeat imaging 5/9  · Trialed waterseal again 5/10 and checked repeat chest x-ray with successful rexpansion  · Pulmonary also following for management of chest tube  · Chest tube was removed in the evening of 5/10 and patient was tapered off oxygen  · Remains well this am and is eager to go home

## 2023-05-10 NOTE — PLAN OF CARE
Problem: MOBILITY - ADULT  Goal: Maintain or return to baseline ADL function  Description: INTERVENTIONS:  -  Assess patient's ability to carry out ADLs; assess patient's baseline for ADL function and identify physical deficits which impact ability to perform ADLs (bathing, care of mouth/teeth, toileting, grooming, dressing, etc )  - Assess/evaluate cause of self-care deficits   - Assess range of motion  - Assess patient's mobility; develop plan if impaired  - Assess patient's need for assistive devices and provide as appropriate  - Encourage maximum independence but intervene and supervise when necessary  - Involve family in performance of ADLs  - Assess for home care needs following discharge   - Consider OT consult to assist with ADL evaluation and planning for discharge  - Provide patient education as appropriate  Outcome: Progressing  Goal: Maintains/Returns to pre admission functional level  Description: INTERVENTIONS:  - Perform BMAT or MOVE assessment daily    - Set and communicate daily mobility goal to care team and patient/family/caregiver  - Collaborate with rehabilitation services on mobility goals if consulted  - Perform Range of Motion 3 times a day  - Reposition patient every 3 hours    - Dangle patient 3 times a day  - Stand patient 3 times a day  - Ambulate patient 3 times a day  - Out of bed to chair 3 times a day   - Out of bed for meals 3 times a day  - Out of bed for toileting  - Record patient progress and toleration of activity level   Outcome: Progressing     Problem: Prexisting or High Potential for Compromised Skin Integrity  Goal: Skin integrity is maintained or improved  Description: INTERVENTIONS:  - Identify patients at risk for skin breakdown  - Assess and monitor skin integrity  - Assess and monitor nutrition and hydration status  - Monitor labs   - Assess for incontinence   - Turn and reposition patient  - Assist with mobility/ambulation  - Relieve pressure over bony prominences  - Avoid friction and shearing  - Provide appropriate hygiene as needed including keeping skin clean and dry  - Evaluate need for skin moisturizer/barrier cream  - Collaborate with interdisciplinary team   - Patient/family teaching  - Consider wound care consult   Outcome: Progressing     Problem: PAIN - ADULT  Goal: Verbalizes/displays adequate comfort level or baseline comfort level  Description: Interventions:  - Encourage patient to monitor pain and request assistance  - Assess pain using appropriate pain scale  - Administer analgesics based on type and severity of pain and evaluate response  - Implement non-pharmacological measures as appropriate and evaluate response  - Consider cultural and social influences on pain and pain management  - Notify physician/advanced practitioner if interventions unsuccessful or patient reports new pain  Outcome: Progressing     Problem: INFECTION - ADULT  Goal: Absence or prevention of progression during hospitalization  Description: INTERVENTIONS:  - Assess and monitor for signs and symptoms of infection  - Monitor lab/diagnostic results  - Monitor all insertion sites, i e  indwelling lines, tubes, and drains  - Monitor endotracheal if appropriate and nasal secretions for changes in amount and color  - [place] appropriate cooling/warming therapies per order  - Administer medications as ordered  - Instruct and encourage patient and family to use good hand hygiene technique  - Identify and instruct in appropriate isolation precautions for identified infection/condition  Outcome: Progressing     Problem: SAFETY ADULT  Goal: Maintain or return to baseline ADL function  Description: INTERVENTIONS:  -  Assess patient's ability to carry out ADLs; assess patient's baseline for ADL function and identify physical deficits which impact ability to perform ADLs (bathing, care of mouth/teeth, toileting, grooming, dressing, etc )  - Assess/evaluate cause of self-care deficits - Assess range of motion  - Assess patient's mobility; develop plan if impaired  - Assess patient's need for assistive devices and provide as appropriate  - Encourage maximum independence but intervene and supervise when necessary  - Involve family in performance of ADLs  - Assess for home care needs following discharge   - Consider OT consult to assist with ADL evaluation and planning for discharge  - Provide patient education as appropriate  Outcome: Progressing  Goal: Maintains/Returns to pre admission functional level  Description: INTERVENTIONS:  - Perform BMAT or MOVE assessment daily    - Set and communicate daily mobility goal to care team and patient/family/caregiver  - Collaborate with rehabilitation services on mobility goals if consulted  - Perform Range of Motion 3 times a day  - Reposition patient every 3 hours    - Dangle patient 3 times a day  - Stand patient 3 times a day  - Ambulate patient 3 times a day  - Out of bed to chair 3 times a day   - Out of bed for meals 3 times a day  - Out of bed for toileting  - Record patient progress and toleration of activity level   Outcome: Progressing  Goal: Patient will remain free of falls  Description: INTERVENTIONS:  - Educate patient/family on patient safety including physical limitations  - Instruct patient to call for assistance with activity   - Consult OT/PT to assist with strengthening/mobility   - Keep Call bell within reach  - Keep bed low and locked with side rails adjusted as appropriate  - Keep care items and personal belongings within reach  - Initiate and maintain comfort rounds  - Make Fall Risk Sign visible to staff  - Offer Toileting every 2 Hours, in advance of need  - Initiate/Maintain bed alarm  - Obtain necessary fall risk management equipment:   - Apply yellow socks and bracelet for high fall risk patients  - Consider moving patient to room near nurses station  Outcome: Progressing     Problem: DISCHARGE PLANNING  Goal: Discharge to home or other facility with appropriate resources  Description: INTERVENTIONS:  - Identify barriers to discharge w/patient and caregiver  - Arrange for needed discharge resources and transportation as appropriate  - Identify discharge learning needs (meds, wound care, etc )  - Arrange for interpretive services to assist at discharge as needed  - Refer to Case Management Department for coordinating discharge planning if the patient needs post-hospital services based on physician/advanced practitioner order or complex needs related to functional status, cognitive ability, or social support system  Outcome: Progressing     Problem: Knowledge Deficit  Goal: Patient/family/caregiver demonstrates understanding of disease process, treatment plan, medications, and discharge instructions  Description: Complete learning assessment and assess knowledge base    Interventions:  - Provide teaching at level of understanding  - Provide teaching via preferred learning methods  Outcome: Progressing

## 2023-05-10 NOTE — PROGRESS NOTES
[address] [place] [address]  Progress Note  Name: Donato Hdz  MRN: [id number]  Unit/Bed#: E4 -01 I Date of Admission: 5/8/2023   Date of Service: 5/10/2023 I Hospital Day: 2    Assessment/Plan   * Pneumothorax  Assessment & Plan  55-year-old male presented to institution for his elective CT-guided right upper lung lobe/nodule biopsy  Procedure complicated with a right sided pneumothorax  He is status post CT-guided right anterior 8 [place] chest tube placement  · IR attempted chest tube removal however lung had recollapsed on repeat imaging  · Plan is to trial waterseal again today and check repeat chest x-ray  · Pulmonary also following for management of chest tube  · Continue oxygen support- currently on 2 L    Hypomagnesemia  Assessment & Plan  · Magnesium low at 1 8  · We will replete and recheck in a m  Stage 3 chronic kidney disease [facility])  Assessment & Plan  Lab Results   Component Value Date    EGFR 52 05/10/2023    EGFR 55 05/09/2023    EGFR 53 05/08/2023    CREATININE 1 33 (H) 05/10/2023    CREATININE 1 27 05/09/2023    CREATININE 1 31 (H) 05/08/2023   · History of CKD stage III  · Baseline around 1 2-1 3    Tobacco abuse  Assessment & Plan  · Still currently smoking  · Discussed smoking cessation  · Nicotine patch in place    History of prostate cancer  Assessment & Plan  · History of prostate cancer, status post radical prostatectomy in 2005    Hypertension  Assessment & Plan  · Home regimen losartan 100 mg daily  · BP here 155/70    Lung nodules  Assessment & Plan  · Lung screen showed a 1 3 cm right upper lobe spiculated nodule s/p biopsy 1/1/97  · Complicated by pneumothorax  · Needs outpatient follow-up for lung tissue biopsy results obtained on 5/8    Atherosclerosis of native arteries of extremities with intermittent claudication, bilateral legs (HCC)  Assessment & Plan  Aortoiliac and infrainguinal arterial occlusive disease with bilateral calf claudication    However, per outpatient provider note he is unable to tolerate antiplatelet therapy due to GI bleed and has been intolerant to Pletal   He also reports a metal allergy  · Continue outpatient follow-up with vascular       VTE Pharmacologic Prophylaxis:   Pharmacologic: s/p procedure  Mechanical VTE Prophylaxis in Place: Yes    Discharge Plan: With need for continued inpatient stay for chest tube as well as oxygen requirements  Discussions with Specialists or Other Care Team Provider: Nursing, case management    Education and Discussions with Family / Patient: Patient, daughter via telephone-updated    Time Spent for Care: 45 minutes  This time was spent on one or more of the following: performing physical exam; counseling and coordination of care; obtaining or reviewing history; documenting in the medical record; reviewing/ordering tests, medications, or procedures; communicating with other healthcare professionals and discussing with patient's family/caregivers  Current Length of Stay: 2 day(s)  Current Patient Status: Inpatient   Code Status: Level 1 - Full Code    Subjective:   Resting in bed  Remains on 2 L oxygen nasal cannula  Attempting waterseal yesterday was unsuccessful and patient remains with chest tube in place  He is eager to have chest tube removed and to go home  Updated daughter via telephone  Objective:     Vitals:   Temp (24hrs), Av 1 °F (36 7 °C), Min:97 6 °F (36 4 °C), Max:99 3 °F (37 4 °C)    Temp:  [97 6 °F (36 4 °C)-99 3 °F (37 4 °C)] 97 8 °F (36 6 °C)  HR:  [68-80] 80  Resp:  [16-18] 18  BP: (151-182)/(67-79) 155/70  SpO2:  [92 %-96 %] 94 %  Body mass index is 24 13 kg/m²  Input and Output Summary (last 24 hours): Intake/Output Summary (Last 24 hours) at 5/10/2023 0956  Last data filed at 5/10/2023 0600  Gross per 24 hour   Intake --   Output 1268 ml   Net -1268 ml       Physical Exam:     Physical Exam  Vitals and nursing note reviewed     Constitutional:       General: He is not in acute distress  Appearance: Normal appearance  He is normal weight  He is not ill-appearing, toxic-appearing or diaphoretic  HENT:      Head: Normocephalic and atraumatic  Eyes:      General: No scleral icterus  Cardiovascular:      Rate and Rhythm: Normal rate and regular rhythm  Pulmonary:      Effort: No respiratory distress  Breath sounds: No stridor  No wheezing or rhonchi  Comments: Right-sided chest tube in place to wall suction  On 2 L nasal cannula  Abdominal:      General: Bowel sounds are normal  There is no distension  Palpations: Abdomen is soft  There is no mass  Tenderness: There is no abdominal tenderness  Hernia: No hernia is present  Musculoskeletal:         General: No swelling  Cervical back: Neck supple  Skin:     General: Skin is warm and dry  Neurological:      Mental Status: He is alert and oriented to person, place, and time  Mental status is at baseline  Psychiatric:         Mood and Affect: Mood normal          Behavior: Behavior normal          Additional Data:     Labs:    Results from last 7 days   Lab Units 05/10/23  0643   WBC Thousand/uL 12 42*   HEMOGLOBIN g/dL 10 9*   HEMATOCRIT % 33 9*   PLATELETS Thousands/uL 269   NEUTROS PCT % 75   LYMPHS PCT % 17   MONOS PCT % 7   EOS PCT % 1     Results from last 7 days   Lab Units 05/10/23  0643   POTASSIUM mmol/L 4 2   CHLORIDE mmol/L 109*   CO2 mmol/L 20*   BUN mg/dL 23   CREATININE mg/dL 1 33*   CALCIUM mg/dL 8 9   ALK PHOS U/L 78   ALT U/L 5*   AST U/L 11*           * I Have Reviewed All Lab Data Listed Above  * Additional Pertinent Lab Tests Reviewed:  Ele 66 Admission Reviewed    Imaging:    Imaging Reports Reviewed Today Include:   Imaging Personally Reviewed by Myself Includes:      Recent Cultures (last 7 days):           Lines/Drains:  Invasive Devices     Peripheral Intravenous Line  Duration           Peripheral IV 05/08/23 Right Hand 2 days Drain  Duration           Chest Tube 1 Right Pleural 8 5 Fr  2 days                Last 24 Hours Medication List:   Current Facility-Administered Medications   Medication Dose Route Frequency Provider Last Rate   • albuterol  2 puff Inhalation Q4H PRN Lala Pace MD     • ferrous sulfate  325 mg Oral HS Lala Pace MD     • losartan  100 mg Oral Daily Lala Pace MD     • magnesium sulfate  2 g Intravenous Once Steffany Philip PA-C 2 g (05/10/23 0575)   • nicotine  1 patch Transdermal Daily Lala Pace MD     • oxyCODONE  5 mg Oral Q4H PRN Bela Daniel MD     • pantoprazole  40 mg Oral Early Morning Lala Pace MD     • pravastatin  20 mg Oral Daily With Lulu Valentine MD          Today, Patient Was Seen By: Steffany Philip PA-C    ** Please Note: This note has been constructed using a voice recognition system   **

## 2023-05-10 NOTE — ASSESSMENT & PLAN NOTE
Lab Results   Component Value Date    EGFR 52 05/10/2023    EGFR 55 05/09/2023    EGFR 53 05/08/2023    CREATININE 1 33 (H) 05/10/2023    CREATININE 1 27 05/09/2023    CREATININE 1 31 (H) 05/08/2023   · History of CKD stage III  · Baseline around 1 2-1 3

## 2023-05-10 NOTE — ASSESSMENT & PLAN NOTE
· Lung screen showed a 1 3 cm right upper lobe spiculated nodule s/p biopsy 3/7/24  · Complicated by pneumothorax    · Needs outpatient follow-up for lung tissue biopsy results obtained on 5/8

## 2023-05-10 NOTE — PROGRESS NOTES
"Progress Note - Pulmonary   Chio Ply 68 y o  male MRN: [id number]  Unit/Bed#: E4 -01 Encounter: [id number]    Assessment:  1  Right-sided iatrogenic pneumothorax secondary to lung nodule biopsy  2  Lung nodules status post biopsy of the right upper lobe spiculated nodule yesterday  3  COPD without acute exacerbation, [facility]  4  Nicotine dependence with current use    Plan:  · Patient tolerated waterseal for few hours this morning and his pneumothorax has resolved and right lung remained expanded, I believe we can remove chest tube today  I discussed with IR who are planning to remove the chest tube  · Continue current inhalers as home med and follow with pulmonologist as outpatient  · Follow-up with cytology from lung biopsy  · Encourage smoking cessation with nicotine patch  · From pulmonary standpoint patient can be discharged home after removal of chest tube, will sign off, please call with questions    ----------------------------------------------------------------------------------------------------------------------    HPI/Interval History:   Patient feels better, denies complaint of chest pain or shortness of breath, chest tube to waterseal with no evidence of local pleural fistula and no air leak  Vitals:   Blood pressure 154/71, pulse 70, temperature 98 3 °F (36 8 °C), temperature source Temporal, resp  rate 18, height 5' 6\" (1 676 m), weight 67 8 kg (149 lb 7 6 oz), SpO2 94 %  ,Body mass index is 24 13 kg/m²  SpO2: 94 %  SpO2 Activity: At Rest  O2 Device: Nasal cannula    Physical Exam:   Gen: Alert and without respiratory distress  HEENT: PERRL  O/P: clear  no erythema or exudate  Neck: Supple  There is no JVD, no LAD or thyromegaly appreciated  Trachea is midline  Chest: Equal breath sounds and clear to auscultation bilaterally but can state crepitus from subcutaneous emphysema over the right lung  Cardiac: S1-S2 regular  Abdomen: Soft and nontender   Bowel sounds are " "present  Extremities: No edema  Neuro:  Awake alert and oriented, no focal deficits    Labs:    CBC:  Results from last 7 days   Lab Units 05/10/23  0643 05/09/23  0437 05/08/23  1734   WBC Thousand/uL 12 42* 13 18* 12 73*   HEMOGLOBIN g/dL 10 9* 11 1* 11 6*   HEMATOCRIT % 33 9* 35 2* 36 7   PLATELETS Thousands/uL 269 272 278       CMP:   Results from last 7 days   Lab Units 05/10/23  0643 05/09/23  0437 05/08/23  1734   POTASSIUM mmol/L 4 2 4 4 4 5   CHLORIDE mmol/L 109* 109* 111*   CO2 mmol/L 20* 18* 21   BUN mg/dL 23 22 21   CREATININE mg/dL 1 33* 1 27 1 31*   CALCIUM mg/dL 8 9 8 8 8 7   ALK PHOS U/L 78 76  --    ALT U/L 5* 6*  --    AST U/L 11* 10*  --          Imaging studies:  Chest x-ray reviewed on PACS in the morning while chest tube on low wall suction: Expanded right lung with chest tube in place, no evidence of pneumothorax    Chest x-ray from today after few hours of waterseal reviewed on PACS as well: Right lung remains expanded with no pneumothorax  Zaira Johnston MD    Portions of the record may have been created with voice recognition software  Occasional wrong word or \"sound a like\" substitutions may have occurred due to the inherent limitations of voice recognition software  Read the chart carefully and recognize, using context, where substitutions have occurred      "

## 2023-05-10 NOTE — ACP (ADVANCE CARE PLANNING)
Serious Illness Conversation    1  What is your understanding now of where you are with your illness? Prognostic Understanding: appropriate understanding of prognosis  Patient is aware that he had a biopsy of his lung given a mass like nodule  Findings were discussed in regards to results of biopsy indicating squamous cell carcinoma  Oncology/hematology saw the patient while inpatient and discussed treatment possibilites going forward  Additional need for imaging to complete staging  2  How much information about what is likely to be ahead with your illness would you like to have? Information: patient wants to be fully informed  He is able to make his own decisions and has full comprehension of his medical diagnosis  3  What did you (clinician) communicate to the patient? Prognostic Communication: Uncertain - It can be difficult to predict what will happen with your illness  I hope you will continue to live well for a long time but I’m worried that you could get sick quickly, and I think it is important to prepare for that possibility  Patient aware that he has cancer and that he is going to weight all risks and benefits going forward prior to making a decision on if he wants to proceed with treatment or not  4  If your health situation worsens, what are your most important goals? Goals: be physically comfortable, not be a burden, have my medical decisions respected  Patient reports that he really is unsure he is wants to go through with treatment or even quit smoking  He reports that everyone in his house smokes  His wife smokes 3 packs a day, his sons both son and so does his daughter  5  What are the biggest fears and worries about the future and your health? Fears/Worries: pain, getting treatments I do not want, other symptoms  Patient would like to speak to oncology in regards to the details about treatment, longevity of life, quality of life       6  What abilities are so critical to "your life that you cannot imagine living without them? Unacceptable Function: not being myself, being in pain or very uncomfortable     7  What gives you strength as you think about the future with your illness? Knowing that he had a good life  Patient reports \"I know I'm not going to live forever and I had a good life  \"     8  If you become sicker, how much are you willing to go through for the possibility of gaining more time? Be in the hospital: Yes Have a feeding tube: No   Be in the ICU: Yes    Be on a ventilator: Yes Be uncomfortable: No   Be on dialysis: No    9  How much does your proxy and family know about your priorities and wishes? Discussion Discussion: some discussion but incomplete - Patient reports that he is unsure how his family is going to take everything and doesn't know if they are ready to face everything  I’ve heard you say that your decision making in regards to determining a treatment plan is really important to you  Keeping that in mind, and what we know about your illness, I recommend that we have you followup with outpatient oncology to weight all risks and benefits so you are able to make the best decision possible for you and your family  This will help us make sure that your treatment plans reflect what’s important to you  How does this plan sound to you? I will do everything I can to help you through this    Patient verbalized understanding of the plan     I have spent 32 minutes speaking with my patient on advanced care planning today or during this visit  "

## 2023-05-10 NOTE — PROGRESS NOTES
Tissue results from 5/8/23 biopsy of right lung nodule-now resulted     Biopsy reveals squamous cell carcinoma - Immunohistochemistry was performed on block A1 with adequate controls  Tumor cells are positive for p40  They are negative for CK7, CK20, TTF-1, Napsin A, Synaptophysin, and Chromogranin  Ki-67 is moderately-markedly increased       Hematology/oncology consulted

## 2023-05-10 NOTE — ASSESSMENT & PLAN NOTE
· Lung screen showed a 1 3 cm right upper lobe spiculated nodule   · Underwent biopsy of nodule/mass 5/8/23  · Biopsy was complicated by pneumothorax and patient was admitted  · Lung tissue biopsy results now indicate squamous cell carcinoma - Positive for p40  Negative for CK7, CK20, TTF-1, Napsin A, Synaptophysin, and Chromogranin   Ki-67 is moderately-markedly increased  · Oncology saw in consultation here -patient unsure if he wants to proceed with treatment or not however is willing to meet with oncologist as an outpatient to discuss  · CT chest abdomen and pelvis obtained for additional staging  · Ambulatory referral sent to hematology/oncology  · Case and plan discussed with daughter as well

## 2023-05-10 NOTE — CONSULTS
"Medical Oncology/Hematology Consult Note  Chirag Carmen, male, 68 y  o , 1950,  [place] [address] /E4 -*, [phone]     Reason for consultation: Biopsy results finalized today per primary service    Patient is a 51-year-old male with PMH of stage III chronic kidney disease, hypertension, atherosclerosis, tobacco abuse, history of prostate cancer status post radical prostatectomy in   He was admitted on 2023 for an elective CT-guided right upper lung lobe/nodule biopsy  Procedure complicated with a right- sided pneumothorax  Biopsy results returned today showing squamous cell carcinoma    ECO  Fairly active at home, makes his own meals, completes self-care, \"tinkers around the house\"    Assessment and Plan:  · Squamous cell carcinoma   · Pneumothorax  -Right lung nodule biopsy returned today, 5/10/2023, showing squamous cell carcinoma  Positive for p40  Negative for CK7, CK20, TTF-1, Napsin A, Synaptophysin, and Chromogranin  Ki-67 is moderately-markedly increased  -Chest tube in place, pulmonology following  Currently on 2 L of oxygen via nasal cannula  -CT chest (2023)-   Interval right chest tube placement with residual small pneumothorax and moderate amount of subcutaneous emphysema in the anterior chest wall adjacent to the chest tube  Spiculated mass in the right upper lobe  The mass remains suspicious for a neoplastic process/bronchogenic carcinoma  Right lower lobe nodules largest measuring 8 mm unchanged from 2019  Emphysema  2   Chronic anemia  -Normocytic, normochromic  MCV 93, MCHC 32 2  -Hemoglobin trend: 10 9 <8 0 <8 8  -History of CKD stage III  -Anemia exacerbated by hospitalization as well    Interval hx:  Discussed pathology results with patient and patient's daughter  Patient not entirely convinced that he wants treatment, but will explore options with his primary oncologist  Patient expressed not keen in giving up smoking and was worried about treatment   Equally " discussed need for additional imaging for staging  PLAN:  -Recommend CT of abdomen pelvis to complete staging  Last CT imaging was in February 2023  -Anemia panel ordered to optimize treatment  -Discussed recommendation with patient in continuing smoking cessation  He is currently on a nicotine patch  -We will schedule patient in the oncology outpatient clinic  Communication with patient/family: Updated patient's daughter at the bedside  Thank you for this consult  Jimmy Wallis, [address], CRNP  Hematology-Oncology       History of present illness:  Lucila Alvarado is a 68 y o  male with PMH of stage III chronic kidney disease, hypertension, atherosclerosis, tobacco abuse, history of prostate cancer status post radical prostatectomy in 2005  He was admitted on 5/8/2023 for an elective CT-guided right upper lung lobe/nodule biopsy  Procedure complicated with a right- sided pneumothorax  Chest tube in place  Biopsy results returned showing squamous cell carcinoma  Oncology was consulted to offer recommendations with new diagnosis  Recommending CT Abdomen and pelvis to complete staging  Review of Systems:   Review of Systems   Constitutional: Positive for activity change and fatigue  Negative for chills and fever  HENT: Negative for ear pain and sore throat  Eyes: Negative for pain and visual disturbance  Respiratory: Positive for shortness of breath  Negative for cough  Cardiovascular: Negative for chest pain and palpitations  Gastrointestinal: Negative for abdominal pain and vomiting  Genitourinary: Negative for dysuria and hematuria  Musculoskeletal: Negative for arthralgias and back pain  Skin: Negative for color change and rash  Neurological: Negative for seizures and syncope  Psychiatric/Behavioral: The patient is nervous/anxious  All other systems reviewed and are negative        Oncology History:   Cancer Staging   No matching staging information was found for the patient  Oncology History    No history exists  Past Medical History:   Past Medical History:   Diagnosis Date   • Anemia    • Arthritis    • Cancer [facility])     prostate   • COPD (chronic obstructive pulmonary disease) ([facility] [address] )    • History of transfusion 2005    auto transfusion   • Hyperlipidemia    • Hypertension    • Leaky heart valve    • Lung nodules     unsure of dx   • PVD (peripheral vascular disease) (HCC)    • Tobacco abuse    • UTI (urinary tract infection)        Past Surgical History:   Procedure Laterality Date   • COLONOSCOPY     • HERNIA REPAIR      with mesh   • IR BIOPSY LUNG  5/8/2023   • VA CYSTO CALIBRATION DILAT URTL STRIX/STENOSIS N/A 9/13/2022    Procedure: DILATATION URETHRAL, incision bladder neck contracture,cysto; Surgeon: Gertrude Isaac MD;  Location: [facility];  Service: Urology   • VA TRANSURETHRAL RESECTION BLADDER NECK N/A 3/8/2022    Procedure: RESECTION TRANSURETHRAL BLADDER NECK CONTRACTURE;  Surgeon: Gertrude Isaac MD;  Location: [facility];  Service: Urology   • PROSTATECTOMY     • TONSILLECTOMY         History reviewed  No pertinent family history  Social History     Socioeconomic History   • Marital status: /Civil Union     Spouse name: None   • Number of children: None   • Years of education: None   • Highest education level: None   Occupational History   • None   Tobacco Use   • Smoking status: Every Day     Packs/day: 1 00     Years: 60 00     Pack years: 60 00     Types: Cigarettes   • Smokeless tobacco: Never   • Tobacco comments:     One cigarette at 0400 5/8   Vaping Use   • Vaping Use: Never used   Substance and Sexual Activity   • Alcohol use:  Yes     Alcohol/week: 6 0 standard drinks     Types: 6 Cans of beer per week   • Drug use: Never   • Sexual activity: Not Currently   Other Topics Concern   • None   Social History Narrative   • None     Social Determinants of Health     Financial Resource Strain: Not on file   Food Insecurity: Not on file Transportation Needs: Not on file   Physical Activity: Not on file   Stress: Not on file   Social Connections: Not on file   Intimate Partner Violence: Not on file   Housing Stability: Unknown   • Unable to Pay for Housing in the Last Year: Not on file   • Number of [place] in the Last Year: 1   • Unstable Housing in the Last Year: Not on file         Current Facility-Administered Medications:   •  albuterol (PROVENTIL HFA,VENTOLIN HFA) inhaler 2 puff, 2 puff, Inhalation, Q4H PRN, Kevin Pratt MD  •  ferrous sulfate tablet 325 mg, 325 mg, Oral, HS, Kevin Pratt MD, 325 mg at 05/09/23 2109  •  losartan (COZAAR) tablet 100 mg, 100 mg, Oral, Daily, Kevin Pratt MD, 100 mg at 05/10/23 4476  •  magnesium sulfate 2 g/50 mL IVPB (premix) 2 g, 2 g, Intravenous, Once, [employer], PA-, Last Rate: 25 mL/hr at 05/10/23 0942, 2 g at 05/10/23 0942  •  nicotine (NICODERM CQ) 21 mg/24 hr TD 24 hr patch 1 patch, 1 patch, Transdermal, Daily, Kevin Pratt MD  •  oxyCODONE (ROXICODONE) IR tablet 5 mg, 5 mg, Oral, Q4H PRN, Deion Mariano MD, 5 mg at 05/09/23 1638  •  pantoprazole (PROTONIX) EC tablet 40 mg, 40 mg, Oral, Early Morning, Kevin Pratt MD, 40 mg at 05/10/23 0510  •  pravastatin (PRAVACHOL) tablet 20 mg, 20 mg, Oral, Daily With Brisa Melchor MD, 20 mg at 05/09/23 1636    Medications Prior to Admission   Medication   • albuterol (PROVENTIL HFA,VENTOLIN HFA) 90 mcg/act inhaler   • cholecalciferol (VITAMIN D3) 1,000 units tablet   • ferrous sulfate 325 (65 Fe) mg tablet   • losartan (COZAAR) 100 MG tablet   • pantoprazole (PROTONIX) 40 mg tablet   • pravastatin (PRAVACHOL) 80 mg tablet       Allergies   Allergen Reactions   • Aspirin Other (See Comments)     Polyps in sinuses    • Nitrofurantoin GI Intolerance     Stomach cramping    • Sm Non-Asprin Sinus [Pseudoephedrine-Acetaminophen] Other (See Comments)     Patient advised to avoid due to polyps       • Ciprofloxacin Tachycardia   • Penicillins "Rash   • Rosuvastatin Myalgia   • Sulfa Antibiotics Rash         Physical Exam:     /71 (BP Location: Right arm)   Pulse 70   Temp 98 3 °F (36 8 °C) (Temporal)   Resp 18   Ht 5' 6\" (1 676 m)   Wt 67 8 kg (149 lb 7 6 oz)   SpO2 94%   BMI 24 13 kg/m²     Physical Exam  HENT:      Head: Normocephalic  Mouth/Throat:      Mouth: Mucous membranes are dry  Cardiovascular:      Rate and Rhythm: Normal rate and regular rhythm  Pulmonary:      Comments: Chest tube in place  Abdominal:      General: Bowel sounds are normal       Palpations: Abdomen is soft  Skin:     General: Skin is warm and dry  Coloration: Skin is pale  Neurological:      General: No focal deficit present  Mental Status: He is alert and oriented to person, place, and time  Motor: Weakness present  Psychiatric:         Mood and Affect: Mood normal          Behavior: Behavior normal          Thought Content:  Thought content normal            Recent Results (from the past 48 hour(s))   CBC and differential    Collection Time: 05/08/23  5:34 PM   Result Value Ref Range    WBC 12 73 (H) 4 31 - 10 16 Thousand/uL    RBC 3 92 3 88 - 5 62 Million/uL    Hemoglobin 11 6 (L) 12 0 - 17 0 g/dL    Hematocrit 36 7 36 5 - 49 3 %    MCV 94 82 - 98 fL    MCH 29 6 26 8 - 34 3 pg    MCHC 31 6 31 4 - 37 4 g/dL    RDW 13 7 11 6 - 15 1 %    MPV 9 7 8 9 - 12 7 fL    Platelets 801 283 - 569 Thousands/uL    nRBC 0 /100 WBCs    Neutrophils Relative 77 (H) 43 - 75 %    Immat GRANS % 0 0 - 2 %    Lymphocytes Relative 16 14 - 44 %    Monocytes Relative 6 4 - 12 %    Eosinophils Relative 1 0 - 6 %    Basophils Relative 0 0 - 1 %    Neutrophils Absolute 9 69 (H) 1 85 - 7 62 Thousands/µL    Immature Grans Absolute 0 04 0 00 - 0 20 Thousand/uL    Lymphocytes Absolute 2 02 0 60 - 4 47 Thousands/µL    Monocytes Absolute 0 76 0 17 - 1 22 Thousand/µL    Eosinophils Absolute 0 17 0 00 - 0 61 Thousand/µL    Basophils Absolute 0 05 0 00 - 0 10 Thousands/µL " Basic metabolic panel    Collection Time: 05/08/23  5:34 PM   Result Value Ref Range    Sodium 137 135 - 147 mmol/L    Potassium 4 5 3 5 - 5 3 mmol/L    Chloride 111 (H) 96 - 108 mmol/L    CO2 21 21 - 32 mmol/L    ANION GAP 5 4 - 13 mmol/L    BUN 21 5 - 25 mg/dL    Creatinine 1 31 (H) 0 60 - 1 30 mg/dL    Glucose 88 65 - 140 mg/dL    Calcium 8 7 8 4 - 10 2 mg/dL    eGFR 53 ml/min/1 73sq m   Comprehensive metabolic panel    Collection Time: 05/09/23  4:37 AM   Result Value Ref Range    Sodium 135 135 - 147 mmol/L    Potassium 4 4 3 5 - 5 3 mmol/L    Chloride 109 (H) 96 - 108 mmol/L    CO2 18 (L) 21 - 32 mmol/L    ANION GAP 8 4 - 13 mmol/L    BUN 22 5 - 25 mg/dL    Creatinine 1 27 0 60 - 1 30 mg/dL    Glucose 82 65 - 140 mg/dL    Calcium 8 8 8 4 - 10 2 mg/dL    AST 10 (L) 13 - 39 U/L    ALT 6 (L) 7 - 52 U/L    Alkaline Phosphatase 76 34 - 104 U/L    Total Protein 6 6 6 4 - 8 4 g/dL    Albumin 3 5 3 5 - 5 0 g/dL    Total Bilirubin 0 33 0 20 - 1 00 mg/dL    eGFR 55 ml/min/1 73sq m   CBC and differential    Collection Time: 05/09/23  4:37 AM   Result Value Ref Range    WBC 13 18 (H) 4 31 - 10 16 Thousand/uL    RBC 3 75 (L) 3 88 - 5 62 Million/uL    Hemoglobin 11 1 (L) 12 0 - 17 0 g/dL    Hematocrit 35 2 (L) 36 5 - 49 3 %    MCV 94 82 - 98 fL    MCH 29 6 26 8 - 34 3 pg    MCHC 31 5 31 4 - 37 4 g/dL    RDW 13 4 11 6 - 15 1 %    MPV 9 7 8 9 - 12 7 fL    Platelets 875 972 - 223 Thousands/uL    nRBC 0 /100 WBCs    Neutrophils Relative 79 (H) 43 - 75 %    Immat GRANS % 0 0 - 2 %    Lymphocytes Relative 14 14 - 44 %    Monocytes Relative 6 4 - 12 %    Eosinophils Relative 1 0 - 6 %    Basophils Relative 0 0 - 1 %    Neutrophils Absolute 10 28 (H) 1 85 - 7 62 Thousands/µL    Immature Grans Absolute 0 04 0 00 - 0 20 Thousand/uL    Lymphocytes Absolute 1 78 0 60 - 4 47 Thousands/µL    Monocytes Absolute 0 85 0 17 - 1 22 Thousand/µL    Eosinophils Absolute 0 18 0 00 - 0 61 Thousand/µL    Basophils Absolute 0 05 0 00 - 0 10 Thousands/µL   Magnesium    Collection Time: 05/09/23  4:37 AM   Result Value Ref Range    Magnesium 1 9 1 9 - 2 7 mg/dL   Phosphorus    Collection Time: 05/09/23  4:37 AM   Result Value Ref Range    Phosphorus 2 7 2 3 - 4 1 mg/dL   CBC and differential    Collection Time: 05/10/23  6:43 AM   Result Value Ref Range    WBC 12 42 (H) 4 31 - 10 16 Thousand/uL    RBC 3 66 (L) 3 88 - 5 62 Million/uL    Hemoglobin 10 9 (L) 12 0 - 17 0 g/dL    Hematocrit 33 9 (L) 36 5 - 49 3 %    MCV 93 82 - 98 fL    MCH 29 8 26 8 - 34 3 pg    MCHC 32 2 31 4 - 37 4 g/dL    RDW 13 3 11 6 - 15 1 %    MPV 9 8 8 9 - 12 7 fL    Platelets 579 377 - 673 Thousands/uL    nRBC 0 /100 WBCs    Neutrophils Relative 75 43 - 75 %    Immat GRANS % 0 0 - 2 %    Lymphocytes Relative 17 14 - 44 %    Monocytes Relative 7 4 - 12 %    Eosinophils Relative 1 0 - 6 %    Basophils Relative 0 0 - 1 %    Neutrophils Absolute 9 25 (H) 1 85 - 7 62 Thousands/µL    Immature Grans Absolute 0 05 0 00 - 0 20 Thousand/uL    Lymphocytes Absolute 2 05 0 60 - 4 47 Thousands/µL    Monocytes Absolute 0 86 0 17 - 1 22 Thousand/µL    Eosinophils Absolute 0 17 0 00 - 0 61 Thousand/µL    Basophils Absolute 0 04 0 00 - 0 10 Thousands/µL   Comprehensive metabolic panel    Collection Time: 05/10/23  6:43 AM   Result Value Ref Range    Sodium 136 135 - 147 mmol/L    Potassium 4 2 3 5 - 5 3 mmol/L    Chloride 109 (H) 96 - 108 mmol/L    CO2 20 (L) 21 - 32 mmol/L    ANION GAP 7 4 - 13 mmol/L    BUN 23 5 - 25 mg/dL    Creatinine 1 33 (H) 0 60 - 1 30 mg/dL    Glucose 88 65 - 140 mg/dL    Calcium 8 9 8 4 - 10 2 mg/dL    AST 11 (L) 13 - 39 U/L    ALT 5 (L) 7 - 52 U/L    Alkaline Phosphatase 78 34 - 104 U/L    Total Protein 6 6 6 4 - 8 4 g/dL    Albumin 3 5 3 5 - 5 0 g/dL    Total Bilirubin 0 36 0 20 - 1 00 mg/dL    eGFR 52 ml/min/1 73sq m   Magnesium    Collection Time: 05/10/23  6:43 AM   Result Value Ref Range    Magnesium 1 8 (L) 1 9 - 2 7 mg/dL       CT chest wo contrast    Result Date: 5/9/2023  Narrative: CT CHEST WITHOUT IV CONTRAST INDICATION:   Pneumothorax ptx, thanks  Status post CT-guided right upper lobe biopsy  COMPARISON: 3/18/2023  8/16/2019 TECHNIQUE: CT examination of the chest was performed without intravenous contrast  Multiplanar 2D reformatted images were created from the source data  Radiation dose length product (DLP) for this visit:  211 mGy-cm   This examination, like all CT scans performed in the [facility], was performed utilizing techniques to minimize radiation dose exposure, including the use of iterative reconstruction and automated exposure control  FINDINGS: LUNGS: Spiculated nodule in the right upper lobe which was recently biopsied now appears larger than on the prior study up to 1 8 cm in maximal AP dimension likely related to hemorrhage associated with recent biopsy  There is adjacent small pneumothorax with retraction of the pleural surface on series 2 image 49  Scattered mild emphysematous changes noted  In the right lower lobe there are 2 adjacent nodules the larger one more laterally located measuring 0 8 cm on series 2, image 73, stable from 2019  An adjacent smaller 0 4 cm perifissural nodule series 2 image 70 noted, also stable from 2019  Mild dependent atelectatic changes noted in the lower lung fields  PLEURA: New right anterior chest wall chest tube with small residual right-sided pneumothorax remaining  Adjacent moderate amount of subcutaneous emphysema in the right chest wall noted  HEART/GREAT VESSELS: Atherosclerotic calcifications noted  No thoracic aortic aneurysm  MEDIASTINUM AND JUAN:  Unremarkable  CHEST WALL AND LOWER NECK: Right chest wall gas surrounding an anterior chest wall chest tube on the right  VISUALIZED STRUCTURES IN THE UPPER ABDOMEN:  Unremarkable  OSSEOUS STRUCTURES: Kyphotic angulation at the thoraco lumbar junction secondary to mild chronic anterior wedge deformity   Ankylosis of greater than 4 contiguous vertebrae at the thoracolumbar junction noted  Impression: : 1  Interval right chest tube placement with residual small pneumothorax and moderate amount of subcutaneous emphysema in the anterior chest wall adjacent to the chest tube  2  Spiculated mass in the right upper lobe is slightly larger than on the prior study likely related to a small amount of peritumoral hemorrhage given the recent image guided biopsy yesterday  Continued clinical and imaging follow-up recommended  Correlation with biopsy results recommended  The mass remains suspicious for a neoplastic process/bronchogenic carcinoma  3  Right lower lobe nodules redemonstrated largest measuring 8 mm unchanged from 2019  4  Emphysema  Workstation performed: [id number]     IR biopsy lung    Result Date: 5/8/2023  Narrative: PROCEDURE: CT-guided right upper lung nodule biopsy Right anterior 8 [place] chest tube placement  STAFF: Garen Gottron M D  DOSE LENGTH PRODUCT: 1110 mGy-cm  This examination, like all CT scans performed in the [facility], was performed utilizing techniques to minimize radiation dose exposure, including the use of iterative reconstruction and automated exposure control  NUMBER OF IMAGES: Multiple  COMPLICATIONS: None  MEDICATIONS: Local lidocaine  Moderate sedation with fentanyl and Versed was monitored by radiology nursing staff  Monitoring of conscious sedation totaled 75 minutes  INDICATION: Right upper lung nodule identified on recent comparison CT  Plan for CT-guided biopsy  PROCEDURE: Under intermittent CT guidance, a 17-gauge coaxial needle was advanced into the right upper lung nodule via a right anterior approach  Under intermittent CT guidance, a total of two  1 3   cm 18g  core biopsies were obtained   Postprocedural CT was obtained which identified a large pneumothorax which had considerably expanded after trocar removal  Under intermittent CT guidance, a Yueh needle was advanced into the pneumothorax via a right anterior approach  An Amplatz wire was placed through the needle, after which the tract was dilated and an 8 [place]i chest tube was placed  Position confirmed with post placement CT  FINDINGS: 1  Pre-procedural CT showed a right upper lung lobe nodule, consistent with the comparison CT    2   Intra-procedural CT confirmed good positioning of the biopsy needle within the periphery of the lesion  3   The in-room pathology team confirmed adequacy of the second sample  4   Biopsy complicated by a large pneumothorax, 8 [place] chest tube placement in appropriate position as above  Impression: CT-guided right upper lung nodule biopsy  Only the second sample yielded solid tissue, however fortunately this was deemed lesional by pathology  Biopsy complicated by large pneumothorax, successful CT-guided 8 [place] right anterior chest tube placement  Workstation performed: [id number]     Echo complete w/ contrast if indicated    Result Date: 4/11/2023  Narrative: •  Left Ventricle: Left ventricular cavity size is normal  There is mild concentric hypertrophy  The left ventricular ejection fraction is 71% by visual estimation  Systolic function is hyperdynamic  Wall motion is normal  Diastolic function is mildly abnormal, consistent with grade I (abnormal) relaxation  •  Right Ventricle: Right ventricular cavity size is normal  Systolic function is normal  •  Atrial Septum: The interatrial septum appears to be aneurysmal without clear evidence of shunting by color-flow Doppler  •  Aortic Valve: The aortic valve is trileaflet  The leaflets are mildly thickened  The leaflets are moderately calcified  There is mild stenosis with Vmax 2 37 m/s, RAD 1 5 cm2, DVI 0 36  •  Mitral Valve: There is mild regurgitation  •  Tricuspid Valve: There is trace regurgitation  Pulmonary artery systolic pressures are estimated at 17 mmHg  •  Aorta: The aortic root is ectatic at 3 7 cm  The ascending aorta is normal in size   • Compared to report from 2019, there is mild aortic stenosis with higher velocities and small area in previously noted  Holter monitor    Result Date: 2023  Narrative: PT NAME: Keerthi Moeller : 1950  AGE: 68 y o  GENDER: male MRN: [id number]   PROCEDURE: Holter monitor INDICATIONS: Palpitations/tachycardia DESCRIPTION OF FINDINGS: The patient was monitored for a total of 48 hours  The patient was predominantly in sinus rhythm throughout the study  The average heart rate was 75 beats per minute  The heart rate ranged from a low of 45 beats per minute to a maximum of 138 beats per minute  Ventricular ectopic activity consisted of 348 beats (0 2% of total beats), of which 219 were single PVCs, 123 were interpolated PVCs and 2 were ventricular couplets  There was no sustained or nonsustained ventricular tachycardia  Supraventricular ectopic activity consisted of 1845 beats (0 9% of total beats), of which 42 were single PACs, 1779 were late beats, and 8 were atrial couplets  There were 3 atrial runs, longest of which was 10 beats  There was no supraventricular tachycardia identified  There was no evidence of atrial fibrillation or atrial flutter  There were no significant pauses  The longest R-R interval was 1 9 seconds  There was no evidence of advanced degree heart block  The accompanying patient diary notes no symptoms  Impression: Predominantly sinus rhythm, with an average heart rate of 75 beats per minute Rare premature atrial contractions Rare premature ventricular contractions No significant pauses or advanced degree heart block Attending Physician: Benedict Flowers MD       Labs and pertinent reports reviewed  This note has been generated by voice recognition software system  Therefore, there may be spelling, grammar, and or syntax errors  Please contact if questions arise

## 2023-05-11 ENCOUNTER — DOCUMENTATION (OUTPATIENT)
Dept: HEMATOLOGY ONCOLOGY | Facility: CLINIC | Age: 73
End: 2023-05-11

## 2023-05-11 ENCOUNTER — TELEPHONE (OUTPATIENT)
Dept: HEMATOLOGY ONCOLOGY | Facility: CLINIC | Age: 73
End: 2023-05-11

## 2023-05-11 VITALS
HEART RATE: 82 BPM | DIASTOLIC BLOOD PRESSURE: 72 MMHG | BODY MASS INDEX: 24.02 KG/M2 | TEMPERATURE: 97.9 F | RESPIRATION RATE: 20 BRPM | WEIGHT: 149.47 LBS | HEIGHT: 66 IN | OXYGEN SATURATION: 96 % | SYSTOLIC BLOOD PRESSURE: 155 MMHG

## 2023-05-11 LAB
ALBUMIN SERPL BCP-MCNC: 3.7 G/DL (ref 3.5–5)
ALP SERPL-CCNC: 79 U/L (ref 34–104)
ALT SERPL W P-5'-P-CCNC: 7 U/L (ref 7–52)
ANION GAP SERPL CALCULATED.3IONS-SCNC: 10 MMOL/L (ref 4–13)
AST SERPL W P-5'-P-CCNC: 12 U/L (ref 13–39)
BASOPHILS # BLD AUTO: 0.06 THOUSANDS/ÂΜL (ref 0–0.1)
BASOPHILS NFR BLD AUTO: 1 % (ref 0–1)
BILIRUB SERPL-MCNC: 0.32 MG/DL (ref 0.2–1)
BUN SERPL-MCNC: 26 MG/DL (ref 5–25)
CALCIUM SERPL-MCNC: 9 MG/DL (ref 8.4–10.2)
CHLORIDE SERPL-SCNC: 108 MMOL/L (ref 96–108)
CO2 SERPL-SCNC: 18 MMOL/L (ref 21–32)
CREAT SERPL-MCNC: 1.37 MG/DL (ref 0.6–1.3)
EOSINOPHIL # BLD AUTO: 0.28 THOUSAND/ÂΜL (ref 0–0.61)
EOSINOPHIL NFR BLD AUTO: 3 % (ref 0–6)
ERYTHROCYTE [DISTWIDTH] IN BLOOD BY AUTOMATED COUNT: 13.2 % (ref 11.6–15.1)
GFR SERPL CREATININE-BSD FRML MDRD: 50 ML/MIN/1.73SQ M
GLUCOSE SERPL-MCNC: 100 MG/DL (ref 65–140)
HCT VFR BLD AUTO: 36.1 % (ref 36.5–49.3)
HGB BLD-MCNC: 11.6 G/DL (ref 12–17)
IMM GRANULOCYTES # BLD AUTO: 0.05 THOUSAND/UL (ref 0–0.2)
IMM GRANULOCYTES NFR BLD AUTO: 1 % (ref 0–2)
LYMPHOCYTES # BLD AUTO: 2.61 THOUSANDS/ÂΜL (ref 0.6–4.47)
LYMPHOCYTES NFR BLD AUTO: 26 % (ref 14–44)
MAGNESIUM SERPL-MCNC: 2.1 MG/DL (ref 1.9–2.7)
MCH RBC QN AUTO: 29.4 PG (ref 26.8–34.3)
MCHC RBC AUTO-ENTMCNC: 32.1 G/DL (ref 31.4–37.4)
MCV RBC AUTO: 91 FL (ref 82–98)
MONOCYTES # BLD AUTO: 0.86 THOUSAND/ÂΜL (ref 0.17–1.22)
MONOCYTES NFR BLD AUTO: 8 % (ref 4–12)
NEUTROPHILS # BLD AUTO: 6.34 THOUSANDS/ÂΜL (ref 1.85–7.62)
NEUTS SEG NFR BLD AUTO: 61 % (ref 43–75)
NRBC BLD AUTO-RTO: 0 /100 WBCS
PLATELET # BLD AUTO: 297 THOUSANDS/UL (ref 149–390)
PMV BLD AUTO: 9.8 FL (ref 8.9–12.7)
POTASSIUM SERPL-SCNC: 4.1 MMOL/L (ref 3.5–5.3)
PROT SERPL-MCNC: 7.1 G/DL (ref 6.4–8.4)
RBC # BLD AUTO: 3.95 MILLION/UL (ref 3.88–5.62)
SODIUM SERPL-SCNC: 136 MMOL/L (ref 135–147)
WBC # BLD AUTO: 10.2 THOUSAND/UL (ref 4.31–10.16)

## 2023-05-11 RX ADMIN — PANTOPRAZOLE SODIUM 40 MG: 40 TABLET, DELAYED RELEASE ORAL at 05:41

## 2023-05-11 RX ADMIN — LOSARTAN POTASSIUM 100 MG: 50 TABLET, FILM COATED ORAL at 08:28

## 2023-05-11 NOTE — NURSING NOTE
Discharge instructions reviewed with pt  He is aware of follow-up appointments  Pt has no questions or concerns, family is providing ride home

## 2023-05-11 NOTE — DISCHARGE SUMMARY
[address] [facility]  Discharge- Gudelia Freed 1950, 68 y o  male MRN: [id number]  Unit/Bed#: E4 -01 Encounter: [id number]  Primary Care Provider: Spenser Hogue MD   Date and time admitted to hospital: 5/8/2023 12:32 PM    * Pneumothorax  Assessment & Plan  80-year-old male presented to institution for his elective CT-guided right upper lung lobe/nodule biopsy on 5/8/23  Procedure complicated with a right sided pneumothorax  · Had a CT-guided right anterior 8 [place] chest tube placed  · IR attempted chest tube removal however lung had recollapsed on repeat imaging 5/9  · Trialed waterseal again 5/10 and checked repeat chest x-ray with successful rexpansion  · Pulmonary also following for management of chest tube  · Chest tube was removed in the evening of 5/10 and patient was tapered off oxygen  · Remains well this am and is eager to go home     Malignant neoplasm of right lung [facility])  Assessment & Plan  · Lung screen showed a 1 3 cm right upper lobe spiculated nodule   · Underwent biopsy of nodule/mass 5/8/23  · Biopsy was complicated by pneumothorax and patient was admitted  · Lung tissue biopsy results now indicate squamous cell carcinoma - Positive for p40  Negative for CK7, CK20, TTF-1, Napsin A, Synaptophysin, and Chromogranin   Ki-67 is moderately-markedly increased  · Oncology saw in consultation here -patient unsure if he wants to proceed with treatment or not however is willing to meet with oncologist as an outpatient to discuss  · CT chest abdomen and pelvis obtained for additional staging  · Ambulatory referral sent to hematology/oncology  · Case and plan discussed with daughter as well    Hypomagnesemia  Assessment & Plan  · Magnesium low at 1 8  · Repleted and resolved    Stage 3 chronic kidney disease [facility])  Assessment & Plan  Lab Results   Component Value Date    EGFR 52 05/10/2023    EGFR 55 05/09/2023    EGFR 53 05/08/2023    CREATININE 1 33 (H) 05/10/2023 CREATININE 1 27 05/09/2023    CREATININE 1 31 (H) 05/08/2023   · History of CKD stage III  · Baseline around 1 2-1 3    Tobacco abuse  Assessment & Plan  · Still currently smoking  · Discussed smoking cessation - unsure if he wants to quit  · Nicotine patch in place    History of prostate cancer  Assessment & Plan  · History of prostate cancer, status post radical prostatectomy in 2005    Hypertension  Assessment & Plan  · Home regimen losartan 100 mg daily  Atherosclerosis of native arteries of extremities with intermittent claudication, bilateral legs (HCC)  Assessment & Plan  Aortoiliac and infrainguinal arterial occlusive disease with bilateral calf claudication  However, per outpatient provider note he is unable to tolerate antiplatelet therapy due to GI bleed and has been intolerant to Pletal   He also reports a metal allergy  · Continue outpatient follow-up with vascular      Consultations During Hospital Stay:  · Interventional radiology  · Pulmonology    Procedures Performed:   XR chest portable  Result Date: 5/10/2023  Impression: Evidence of trace residual right pneumothorax  Significant improvement compared to yesterday  Workstation performed: [id number]     XR chest portable  Result Date: 5/10/2023  Impression: Increased, large right pneumothorax status post chest tube to waterseal  Workstation performed: [id number]     XR chest pa & lateral  Result Date: 5/10/2023  Impression: No recurrent pneumothorax status post chest tube to waterseal  Workstation performed: [id number]     CT chest wo contrast  Result Date: 5/9/2023  Impression: : 1  Interval right chest tube placement with residual small pneumothorax and moderate amount of subcutaneous emphysema in the anterior chest wall adjacent to the chest tube  2  Spiculated mass in the right upper lobe is slightly larger than on the prior study likely related to a small amount of peritumoral hemorrhage given the recent image guided biopsy yesterday  Continued clinical and imaging follow-up recommended  Correlation with biopsy results recommended  The mass remains suspicious for a neoplastic process/bronchogenic carcinoma  3  Right lower lobe nodules redemonstrated largest measuring 8 mm unchanged from 2019  4  Emphysema  Workstation performed: [id number]     CT chest abdomen pelvis w contrast  Result Date: 5/11/2023  Impression: 1  Biopsy-proven right upper lobe squamous cell carcinoma  2   Right hilar adenopathy concerning for metastasis  3   Small right hydropneumothorax  The air component is minimal, and has decreased since 1 day prior  4   No findings of metastatic disease in the abdomen or pelvis  Workstation performed: [id number]     IR biopsy lung  Result Date: 5/8/2023  · Impression: CT-guided right upper lung nodule biopsy  Only the second sample yielded solid tissue, however fortunately this was deemed lesional by pathology  Biopsy complicated by large pneumothorax, successful CT-guided 8 Vietnamese right anterior chest tube placement  Workstation performed: [id number]     Significant Findings / Test Results:   · Iatrogenic pneumothorax secondary to recent lung biopsy  · Newly diagnosed squamous cell carcinoma of right lung  · Hypomagnesemia    Incidental Findings:   · None    Test Results Pending at Discharge (will require follow up): · None     Outpatient follow-up requested:  · Hematology/oncology-for newly diagnosed lung cancer  · PCP-1 week    Complications: None    Hospital Course:     Julieth Hill is a 68 y o  male patient with a past medical history of aortoiliac occlusive disease not on antiplatelet therapy due to history of GI bleeding, CKD, hypertension, history of prostate cancer  He originally presented to the hospital on 0/0/7250 due to complication of outpatient elective lung biopsy  Patient had lung cancer screening and was found to have 1 3 cm lung nodule  He underwent elective lung biopsy on 5/8/2023    Unfortunately this was "complicated by right-sided pneumothorax  A chest tube was placed and he was admitted to the internal medicine service  He was seen in consultation by interventional radiology as well as pulmonology  Patient was trialed on waterseal however upon reimaging, he had collapse of the lung again  He had an additional day of chest tube being hooked up to suction  The following day he underwent trial with waterseal again and this was successful  His lung remained stable upon repeat imaging  Chest tube was removed on the evening of 5/10/2023  He was tapered off oxygen and remained stable on room air  While admitted, biopsy is results came back and revealed squamous cell carcinoma  He was seen in consultation by hematology/oncology here to discuss treatment options  He will follow-up in the outpatient setting before making any decisions going forward  Patient is unsure if he wants treatment or even if he would want to quit smoking  Case and plan discussed with daughter via telephone  She is aware of newly diagnosed lung cancer as well as importance of outpatient follow-up  In conclusion, patient is stable for discharge at this time  PT/OT recommending no rehab needs  Condition at Discharge: stable     Discharge Day Visit / Exam:     Subjective: Chest tube was removed yesterday  Breathing remained stable  He remains on room air  Discussed importance of outpatient follow-up with oncology to discuss further treatment options and plan going forward  Plan discussed with daughter via telephone    Vitals: Blood Pressure: 155/72 (05/11/23 0758)  Pulse: 82 (05/11/23 0758)  Temperature: 97 9 °F (36 6 °C) (05/11/23 0758)  Temp Source: Temporal (05/11/23 0758)  Respirations: 20 (05/11/23 0758)  Height: 5' 6\" (167 6 cm) (05/08/23 0659)  Weight - Scale: 67 8 kg (149 lb 7 6 oz) (05/08/23 0659)  SpO2: 96 % (05/11/23 0758)     Exam:   Physical Exam  Vitals and nursing note reviewed     Constitutional:       General: He " is not in acute distress  Appearance: Normal appearance  He is normal weight  He is not ill-appearing, toxic-appearing or diaphoretic  HENT:      Head: Normocephalic and atraumatic  Eyes:      General: No scleral icterus  Cardiovascular:      Rate and Rhythm: Normal rate and regular rhythm  Pulmonary:      Effort: Pulmonary effort is normal  No respiratory distress  Breath sounds: Normal breath sounds  No stridor  No wheezing or rhonchi  Abdominal:      General: Bowel sounds are normal  There is no distension  Palpations: Abdomen is soft  There is no mass  Tenderness: There is no abdominal tenderness  Hernia: No hernia is present  Musculoskeletal:         General: No swelling  Cervical back: Neck supple  Skin:     General: Skin is warm and dry  Neurological:      Mental Status: He is alert and oriented to person, place, and time  Mental status is at baseline  Psychiatric:         Mood and Affect: Mood normal          Behavior: Behavior normal        Discussion with Family: Daughter Dickson Walker via telephone-updated    Discharge instructions/Information to patient and family:   See after visit summary for information provided to patient and family  Provisions for Follow-Up Care:  See after visit summary for information related to follow-up care and any pertinent home health orders  Planned Readmission: no     Discharge Statement:  I spent 54 minutes discharging the patient  This time was spent on the day of discharge  I had direct contact with the patient on the day of discharge  Greater than 50% of the total time was spent examining patient, answering all patient questions, arranging and discussing plan of care with patient as well as directly providing post-discharge instructions  Additional time then spent on discharge activities  Discharge Medications:  See after visit summary for reconciled discharge medications provided to patient and family        ** Please Note: This note has been constructed using a voice recognition system **

## 2023-05-11 NOTE — TELEPHONE ENCOUNTER
I called Jodee Jaramillo to schedule a new patient consultation with [facility] in response to a referral sent to our department       Patient was just leaving the hospital at the time of my call, he indicated that he would call back within the hour to schedule

## 2023-05-11 NOTE — PHYSICAL THERAPY NOTE
Physical Therapy Treatment Note     05/11/23 0846   PT Last Visit   PT Visit Date 05/11/23   Note Type   Note Type Treatment   Pain Assessment   Pain Assessment Tool 0-10   Pain Score No Pain   Restrictions/Precautions   Weight Bearing Precautions Per Order No   Other Precautions Fall Risk   General   Chart Reviewed Yes   Family/Caregiver Present No   Subjective   Subjective Pt  agreeable to PT   Transfers   Sit to Stand 7  Independent   Stand to Sit 7  Independent   Stand pivot 7  Independent   Ambulation/Elevation   Gait pattern WNL   Gait Assistance 7  Independent   Assistive Device None   Distance 550ft   Stair Management Assistance 5  Supervision   Stair Management Technique One rail R;Alternating pattern;Reciprocal;Foreward   Number of Stairs 10   Balance   Static Sitting Normal   Dynamic Sitting Good   Static Standing Good   Dynamic Standing Fair   Ambulatory Fair   Endurance Deficit   Endurance Deficit No   Activity Tolerance   Activity Tolerance Patient tolerated treatment well   Nurse Made Aware Yes   Assessment   Prognosis Good   Problem List Decreased mobility   Assessment Pt  progressing well with mobility and needed no hands on assist for any mobility  pt  noted with no LOB and unsteadiness  Pt  ambulated with no AD and negotaited stairs with reciprocal steps with no incidents  Pt  is DC from PT caseload as pt  has achieved all goals set in IE  Recommend no further PT at this time  Barriers to Discharge None   Goals   Patient Goals None reported   STG Expiration Date 05/19/23   PT Treatment Day 1   Plan   Treatment/Interventions Functional transfer training;Elevations; Patient/family training;Equipment eval/education;Gait training;Spoke to nursing   Progress Discontinue PT   Recommendation   PT Discharge Recommendation No rehabilitation needs   AM-PAC Basic Mobility Inpatient   Turning in Flat Bed Without Bedrails 4   Lying on Back to Sitting on Edge of Flat Bed Without Bedrails 4   Moving Bed to Chair 4   Standing Up From Chair Using Arms 4   Walk in Room 4   Climb 3-5 Stairs With Railing 4   Basic Mobility Inpatient Raw Score 24   Basic Mobility Standardized Score 57 68   Highest Level Of Mobility   -HLM Goal 8: Walk 250 feet or more   -HLM Achieved 8: Walk 250 feet ot more   End of Consult   Patient Position at End of Consult Bedside chair; All needs within reach           Ileene Sorenson, PTA    An AM-PAC basic mobility standardized score less than 42 9 suggest the patient may benefit from discharge to post-acute rehab services

## 2023-05-11 NOTE — PLAN OF CARE
Problem: MOBILITY - ADULT  Goal: Maintain or return to baseline ADL function  Description: INTERVENTIONS:  -  Assess patient's ability to carry out ADLs; assess patient's baseline for ADL function and identify physical deficits which impact ability to perform ADLs (bathing, care of mouth/teeth, toileting, grooming, dressing, etc )  - Assess/evaluate cause of self-care deficits   - Assess range of motion  - Assess patient's mobility; develop plan if impaired  - Assess patient's need for assistive devices and provide as appropriate  - Encourage maximum independence but intervene and supervise when necessary  - Involve family in performance of ADLs  - Assess for home care needs following discharge   - Consider OT consult to assist with ADL evaluation and planning for discharge  - Provide patient education as appropriate  Outcome: Progressing  Goal: Maintains/Returns to pre admission functional level  Description: INTERVENTIONS:  - Perform BMAT or MOVE assessment daily    - Set and communicate daily mobility goal to care team and patient/family/caregiver     - Collaborate with rehabilitation services on mobility goals if consulted  - Ambulate patient 3 times a day  - Out of bed to chair 3 times a day   - Out of bed for meals 3 times a day  - Out of bed for toileting  - Record patient progress and toleration of activity level   Outcome: Progressing     Problem: Prexisting or High Potential for Compromised Skin Integrity  Goal: Skin integrity is maintained or improved  Description: INTERVENTIONS:  - Identify patients at risk for skin breakdown  - Assess and monitor skin integrity  - Assess and monitor nutrition and hydration status  - Monitor labs   - Assess for incontinence   - Turn and reposition patient  - Assist with mobility/ambulation  - Relieve pressure over bony prominences  - Avoid friction and shearing  - Provide appropriate hygiene as needed including keeping skin clean and dry  - Evaluate need for skin moisturizer/barrier cream  - Collaborate with interdisciplinary team   - Patient/family teaching  - Consider wound care consult   Outcome: Progressing     Problem: PAIN - ADULT  Goal: Verbalizes/displays adequate comfort level or baseline comfort level  Description: Interventions:  - Encourage patient to monitor pain and request assistance  - Assess pain using appropriate pain scale  - Administer analgesics based on type and severity of pain and evaluate response  - Implement non-pharmacological measures as appropriate and evaluate response  - Consider cultural and social influences on pain and pain management  - Notify physician/advanced practitioner if interventions unsuccessful or patient reports new pain  Outcome: Progressing     Problem: INFECTION - ADULT  Goal: Absence or prevention of progression during hospitalization  Description: INTERVENTIONS:  - Assess and monitor for signs and symptoms of infection  - Monitor lab/diagnostic results  - Monitor all insertion sites, i e  indwelling lines, tubes, and drains  - Monitor endotracheal if appropriate and nasal secretions for changes in amount and color  - [place] appropriate cooling/warming therapies per order  - Administer medications as ordered  - Instruct and encourage patient and family to use good hand hygiene technique  - Identify and instruct in appropriate isolation precautions for identified infection/condition  Outcome: Progressing     Problem: SAFETY ADULT  Goal: Maintain or return to baseline ADL function  Description: INTERVENTIONS:  -  Assess patient's ability to carry out ADLs; assess patient's baseline for ADL function and identify physical deficits which impact ability to perform ADLs (bathing, care of mouth/teeth, toileting, grooming, dressing, etc )  - Assess/evaluate cause of self-care deficits   - Assess range of motion  - Assess patient's mobility; develop plan if impaired  - Assess patient's need for assistive devices and provide as appropriate  - Encourage maximum independence but intervene and supervise when necessary  - Involve family in performance of ADLs  - Assess for home care needs following discharge   - Consider OT consult to assist with ADL evaluation and planning for discharge  - Provide patient education as appropriate  Outcome: Progressing  Goal: Maintains/Returns to pre admission functional level  Description: INTERVENTIONS:  - Perform BMAT or MOVE assessment daily    - Set and communicate daily mobility goal to care team and patient/family/caregiver     - Collaborate with rehabilitation services on mobility goals if consulted  - Ambulate patient 3 times a day  - Out of bed to chair 3 times a day   - Out of bed for meals 3 times a day  - Out of bed for toileting  - Record patient progress and toleration of activity level   Outcome: Progressing  Goal: Patient will remain free of falls  Description: INTERVENTIONS:  - Educate patient/family on patient safety including physical limitations  - Instruct patient to call for assistance with activity   - Consult OT/PT to assist with strengthening/mobility   - Keep Call bell within reach  - Keep bed low and locked with side rails adjusted as appropriate  - Keep care items and personal belongings within reach  - Initiate and maintain comfort rounds  - Make Fall Risk Sign visible to staff  - Offer Toileting every 2 Hours, in advance of need  - Initiate/Maintain bed alarm  - Obtain necessary fall risk management equipment: alarms  - Apply yellow socks and bracelet for high fall risk patients  - Consider moving patient to room near nurses station  Outcome: Progressing     Problem: DISCHARGE PLANNING  Goal: Discharge to home or other facility with appropriate resources  Description: INTERVENTIONS:  - Identify barriers to discharge w/patient and caregiver  - Arrange for needed discharge resources and transportation as appropriate  - Identify discharge learning needs (meds, wound care, etc )  - Refer to Case Management Department for coordinating discharge planning if the patient needs post-hospital services based on physician/advanced practitioner order or complex needs related to functional status, cognitive ability, or social support system  Outcome: Progressing     Problem: Knowledge Deficit  Goal: Patient/family/caregiver demonstrates understanding of disease process, treatment plan, medications, and discharge instructions  Description: Complete learning assessment and assess knowledge base    Interventions:  - Provide teaching at level of understanding  - Provide teaching via preferred learning methods  Outcome: Progressing

## 2023-05-11 NOTE — PROGRESS NOTES
Intake received/ Chart reviewed: 5/11/23     Pathology completed:  - 5/8/23    Imaging completed:  - 5/10/23 CT CAP  - 5/10/23 CXR    All records needed are in patients chart  No records retrieval needed at this time

## 2023-05-11 NOTE — DISCHARGE INSTR - AVS FIRST PAGE
Needs hematology/oncology in the office to discuss ongoing treatment options if he would like to pursue this  If shortness of breath or chest pain were to redevelop, please proceed back to the ER  Discussed smoking cessation

## 2023-05-11 NOTE — PLAN OF CARE
Problem: PHYSICAL THERAPY ADULT  Goal: Performs mobility at highest level of function for planned discharge setting  See evaluation for individualized goals  Description: Treatment/Interventions: Functional transfer training, LE strengthening/ROM, Elevations, Therapeutic exercise, Patient/family training, Equipment eval/education, Bed mobility, Gait training, Continued evaluation, Spoke to nursing, OT          See flowsheet documentation for full assessment, interventions and recommendations  Outcome: Progressing  Note: Prognosis: Good  Problem List: Decreased mobility  Assessment: Pt  progressing well with mobility and needed no hands on assist for any mobility  pt  noted with no LOB and unsteadiness  Pt  ambulated with no AD and negotaited stairs with reciprocal steps with no incidents  Pt  is DC from PT caseload as pt  has achieved all goals set in IE  Recommend no further PT at this time  Barriers to Discharge: None     PT Discharge Recommendation: No rehabilitation needs    See flowsheet documentation for full assessment

## 2023-05-13 ENCOUNTER — LAB (OUTPATIENT)
Dept: LAB | Facility: HOSPITAL | Age: 73
End: 2023-05-13

## 2023-05-13 DIAGNOSIS — D64.9 ANEMIA, UNSPECIFIED TYPE: ICD-10-CM

## 2023-05-13 DIAGNOSIS — K92.1 GASTROINTESTINAL HEMORRHAGE WITH MELENA: ICD-10-CM

## 2023-05-13 LAB
ERYTHROCYTE [DISTWIDTH] IN BLOOD BY AUTOMATED COUNT: 13.5 % (ref 11.6–15.1)
FERRITIN SERPL-MCNC: 52 NG/ML (ref 8–388)
HCT VFR BLD AUTO: 37.2 % (ref 36.5–49.3)
HGB BLD-MCNC: 11.6 G/DL (ref 12–17)
IRON SATN MFR SERPL: 9 % (ref 20–50)
IRON SERPL-MCNC: 27 UG/DL (ref 65–175)
MCH RBC QN AUTO: 29.4 PG (ref 26.8–34.3)
MCHC RBC AUTO-ENTMCNC: 31.2 G/DL (ref 31.4–37.4)
MCV RBC AUTO: 94 FL (ref 82–98)
PLATELET # BLD AUTO: 324 THOUSANDS/UL (ref 149–390)
PMV BLD AUTO: 9.8 FL (ref 8.9–12.7)
RBC # BLD AUTO: 3.94 MILLION/UL (ref 3.88–5.62)
TIBC SERPL-MCNC: 291 UG/DL (ref 250–450)
WBC # BLD AUTO: 10.63 THOUSAND/UL (ref 4.31–10.16)

## 2023-05-15 ENCOUNTER — HOSPITAL ENCOUNTER (OUTPATIENT)
Dept: GASTROENTEROLOGY | Facility: HOSPITAL | Age: 73
Discharge: HOME | End: 2023-05-15
Attending: STUDENT IN AN ORGANIZED HEALTH CARE EDUCATION/TRAINING PROGRAM

## 2023-05-15 DIAGNOSIS — D64.9 ANEMIA, UNSPECIFIED TYPE: ICD-10-CM

## 2023-05-15 DIAGNOSIS — K92.1 GASTROINTESTINAL HEMORRHAGE WITH MELENA: ICD-10-CM

## 2023-05-17 ENCOUNTER — PATIENT OUTREACH (OUTPATIENT)
Dept: HEMATOLOGY ONCOLOGY | Facility: CLINIC | Age: 73
End: 2023-05-17

## 2023-05-17 DIAGNOSIS — C34.11 MALIGNANT NEOPLASM OF UPPER LOBE OF RIGHT LUNG (HCC): Primary | ICD-10-CM

## 2023-05-17 NOTE — PROGRESS NOTES
Oncology Nurse Navigator    Called patient for initial outreach from nurse navigator  Left voicemail message with contact information  Requested a call back  [phone] - Patient returned called did not leave message  Oncology Nurse Navigator:    [phone] Returned called to patient for initial outreach from nurse navigator  Introduced myself and my role  Assessed barriers of care  Reviewed upcoming appointment with Dr Stone Sendjacki  Patient has no further questions at this time  Patient lives with wife, son and granddaughter  [facility] referral placed  Patient states he drives and his daughter will bring him to his appointment tomorrow  When asked if he needs help with transportation he reports he will reach out to [place] for help, offered [facility] transportation patient declined  Patient reports he is smokes 1 ppd cigarettes, does not want to quit  Asked if he has seen anyone from Pulmonology through the [place] reports it has been about 2 years and he did have PFT's done around that time  He denies any pain  Information provided on [address] along with [facility] and my contact information via [employer]  Patient is aware she can reach out with any questions  General assessment complete  Advised patient I will reach out after his appointment with Dr Bertha Cantrell  Verbalized understanding  Patient is aware he can call with any questions or concerns

## 2023-05-18 ENCOUNTER — CONSULT (OUTPATIENT)
Dept: HEMATOLOGY ONCOLOGY | Facility: CLINIC | Age: 73
End: 2023-05-18

## 2023-05-18 VITALS
HEIGHT: 66 IN | HEART RATE: 68 BPM | SYSTOLIC BLOOD PRESSURE: 148 MMHG | OXYGEN SATURATION: 98 % | WEIGHT: 148 LBS | TEMPERATURE: 97.3 F | BODY MASS INDEX: 23.78 KG/M2 | DIASTOLIC BLOOD PRESSURE: 78 MMHG

## 2023-05-18 DIAGNOSIS — C34.91 SQUAMOUS CELL CARCINOMA OF LUNG, RIGHT (HCC): Primary | ICD-10-CM

## 2023-05-18 NOTE — PROGRESS NOTES
Hematology Outpatient Follow - Up Note  Shantell Villafuerte 68 y o  male MRN: @ Encounter: [id number]        Date:  5/18/2023        Assessment/ Plan:    42-year-old  male, heavy smoker 1 pack of cigarette daily for at least 50 years, presented with enlarging right upper lobe peripheral lung nodule with possible right hilar involvement on CAT scan on March 2023, biopsy showed squamous cell carcinoma of the lung, for better staging we need PET scan    Patient might have stage IIa at least disease by CAT scan, wait for PET scan    I will present his case to the tumor conference        Labs and imaging studies are reviewed by ordering provider once results are available  If there are findings that need immediate attention, you will be contacted when results available  Discussing results and the implication on your healthcare is best discussed in person at your follow-up visit         HPI:    42-year-old  male with history of prostate cancer status post radical prostatectomy in 2005 with severe stress incontinence, bladder neck contracture status post dilation with intermittent self-catheterization, history of GI bleed, peripheral vascular disease, atherosclerosis, he smokes 1 pack of red daily for many many years he drinks alcohol occasionally, CT lung screening on 8/3/2023 showed 1 3 cm right upper lobe spiculated nodule very suspicious    Status post lung biopsy by IR on 5/8/2023 showing squamous cell carcinoma of the lung, complicated by pneumothorax    CT scan of the chest abdomen and pelvis on 5/10/2023 showed 1 5 x 1 1 cm right upper lobe lung mass with a groundglass changes, 0 7 x 0 8 cm right lower lobe nodule is unchanged from August 2019, enlarged right hilar lymph node measuring 2 1 x 1 4 cm, no evidence of disease in the abdomen or pelvis    He denied weight loss he reported cough, shortness of breath, wheezing denied any headache blurred vision melena hematochezia or 2 in bowel habits          Interval History:        Previous Treatment:         Test Results:    Imaging: XR chest portable    Result Date: 5/10/2023  Narrative: CHEST INDICATION:   follow up for ptx, chest tube placed on water seal  COMPARISON: 5/9/2023 EXAM PERFORMED/VIEWS:  XR CHEST PORTABLE FINDINGS: Right apical chest tube  Probable trace right apical and right lateral pneumothorax, significantly improved  Similar right upper lobe peripheral nodule  No effusion  Heart, mediastinal and hilar structures are within normal limits  No acute osseous or soft tissue pathology  Impression: Evidence of trace residual right pneumothorax  Significant improvement compared to yesterday  Workstation performed: [id number]     XR chest portable    Result Date: 5/10/2023  Narrative: CHEST INDICATION:   evaluate for right ptx, chest tube now on waterseal  COMPARISON: 5/9/2023 CT EXAM PERFORMED/VIEWS:  XR CHEST PORTABLE FINDINGS: Right apical chest tube  Large right pneumothorax, increased  Similar right lateral upper lobe nodule  Smaller right lower lobe nodules not visualized  Left lung is clear  The heart, mediastinal and hilar structures are within normal limits  No acute osseous or soft tissue pathology  Similar subcutaneous emphysema on the right  Impression: Increased, large right pneumothorax status post chest tube to waterseal  Workstation performed: [id number]     XR chest pa & lateral    Result Date: 5/10/2023  Narrative: CHEST INDICATION:   chest tube to waterseal, r/o pneumothorax  COMPARISON: 5/10/2023 at 9:35 a m  EXAM PERFORMED/VIEWS:  XR CHEST PA & LATERAL 1:41 p m  FINDINGS: Stable position of right upper lobe chest tube  No pneumothorax identified  Small right pleural effusion  Similar peripheral right upper lobe mass  Heart, mediastinal and hilar structures are within normal limits  Similar right subcutaneous emphysema  No acute osseous or soft tissue pathology       Impression: No recurrent pneumothorax status post chest tube to waterseal  Workstation performed: [id number]     CT chest wo contrast    Result Date: 5/9/2023  Narrative: CT CHEST WITHOUT IV CONTRAST INDICATION:   Pneumothorax ptx, thanks  Status post CT-guided right upper lobe biopsy  COMPARISON: 3/18/2023  8/16/2019 TECHNIQUE: CT examination of the chest was performed without intravenous contrast  Multiplanar 2D reformatted images were created from the source data  Radiation dose length product (DLP) for this visit:  211 mGy-cm   This examination, like all CT scans performed in the [facility], was performed utilizing techniques to minimize radiation dose exposure, including the use of iterative reconstruction and automated exposure control  FINDINGS: LUNGS: Spiculated nodule in the right upper lobe which was recently biopsied now appears larger than on the prior study up to 1 8 cm in maximal AP dimension likely related to hemorrhage associated with recent biopsy  There is adjacent small pneumothorax with retraction of the pleural surface on series 2 image 49  Scattered mild emphysematous changes noted  In the right lower lobe there are 2 adjacent nodules the larger one more laterally located measuring 0 8 cm on series 2, image 73, stable from 2019  An adjacent smaller 0 4 cm perifissural nodule series 2 image 70 noted, also stable from 2019  Mild dependent atelectatic changes noted in the lower lung fields  PLEURA: New right anterior chest wall chest tube with small residual right-sided pneumothorax remaining  Adjacent moderate amount of subcutaneous emphysema in the right chest wall noted  HEART/GREAT VESSELS: Atherosclerotic calcifications noted  No thoracic aortic aneurysm  MEDIASTINUM AND JUAN:  Unremarkable  CHEST WALL AND LOWER NECK: Right chest wall gas surrounding an anterior chest wall chest tube on the right  VISUALIZED STRUCTURES IN THE UPPER ABDOMEN:  Unremarkable   OSSEOUS STRUCTURES: Kyphotic angulation at the thoraco lumbar junction secondary to mild chronic anterior wedge deformity  Ankylosis of greater than 4 contiguous vertebrae at the thoracolumbar junction noted  Impression: : 1  Interval right chest tube placement with residual small pneumothorax and moderate amount of subcutaneous emphysema in the anterior chest wall adjacent to the chest tube  2  Spiculated mass in the right upper lobe is slightly larger than on the prior study likely related to a small amount of peritumoral hemorrhage given the recent image guided biopsy yesterday  Continued clinical and imaging follow-up recommended  Correlation with biopsy results recommended  The mass remains suspicious for a neoplastic process/bronchogenic carcinoma  3  Right lower lobe nodules redemonstrated largest measuring 8 mm unchanged from 2019  4  Emphysema  Workstation performed: [id number]     CT chest abdomen pelvis w contrast    Result Date: 5/11/2023  Narrative: CT CHEST, ABDOMEN AND PELVIS WITH IV CONTRAST INDICATION:   Non-small cell lung cancer (NSCLC), monitor Lung cancer, cancer staging  Biopsy of the right upper lobe nodule on May 8, 2023 yielded squamous cell carcinoma  History of prostate cancer post prostatectomy (2005) COMPARISON: Multiple prior CT examinations of the chest commencing August 16, 2090 with direct comparison made to May 9, 2023  Multiple prior CT and PET/CT examinations of the abdomen and pelvis commencing January 3, 2020 (PET/CT) with direct comparison made to February 26, 2023  TECHNIQUE: CT examination of the chest, abdomen and pelvis was performed  Multiplanar 2D reformatted images were created from the source data  Radiation dose length product (DLP) for this visit:  581 mGy-cm   This examination, like all CT scans performed in the [facility], was performed utilizing techniques to minimize radiation dose exposure, including the use of iterative reconstruction and automated exposure control   IV Contrast:  100 mL of iohexol (OMNIPAQUE) Enteric Contrast: Enteric contrast was not administered  FINDINGS: CHEST LUNGS: Pulmonary emphysema  The recently biopsied squamous cell carcinoma of the right upper lobe measures 1 5 x 1 1 cm (series 6, image 55)  Surrounding groundglass is similar to prior study, and represents postprocedural change  Juxtapleural 0 8 x 0 7 cm right lower lobe nodule is unchanged from index study of August 16, 2019 (series 6, image 80)  PLEURA: Small right hydropneumothorax  The air component is minimal, and decreased since prior study (for example series 6, image 98 overlying the right middle lobe); the fluid component is small, and similar to prior study  Right pleural catheter is no longer present  HEART/GREAT VESSELS: Normal heart size  No thoracic aortic aneurysm  Coronary artery calcifications and atherosclerotic calcifications of the aorta  MEDIASTINUM AND JUAN: Enlarged right hilar lymph node measures 2 1 x 1 4 cm (series 2, image 63)  CHEST WALL AND LOWER NECK: Right chest wall subcutaneous emphysema  Soft tissue nodule in the superficial subcutaneous tissues anterolateral to the inferior left scapula measures 0 8 x 0 8 cm, previously 0 5 x 0 4 cm on index study of August 16, 2019 (series 2, image 52, and 0 5 x 0 4 cm on January 3, 2020 (series 2, image 52)  ABDOMEN LIVER/BILIARY TREE:  Unremarkable  GALLBLADDER:  No calcified gallstones  No pericholecystic inflammatory change  SPLEEN:  Unremarkable  PANCREAS:  Unremarkable  ADRENAL GLANDS:  Unremarkable  KIDNEYS/URETERS: 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  No hydroureteronephrosis  No suspicious renal lesion  STOMACH AND BOWEL: Wall thickening of the sigmoid colon is similar to prior study, compatible with chronic diverticulosis  Findings from the February 28, 2023 colonoscopy are noted  No bowel obstruction APPENDIX: A normal appendix was visualized  ABDOMINOPELVIC CAVITY:  No ascites  No pneumoperitoneum  No lymphadenopathy   VESSELS: Atherosclerotic changes are present  No evidence of aneurysm  PELVIS REPRODUCTIVE ORGANS: Post prostatectomy  URINARY BLADDER:  Unremarkable  ABDOMINAL WALL/INGUINAL REGIONS: Postsurgical change in the left inguinal region and lower abdominal wall  OSSEOUS STRUCTURES: No acute fracture or destructive osseous lesion  Spinal degenerative changes are noted  Impression: 1  Biopsy-proven right upper lobe squamous cell carcinoma  2   Right hilar adenopathy concerning for metastasis  3   Small right hydropneumothorax  The air component is minimal, and has decreased since 1 day prior  4   No findings of metastatic disease in the abdomen or pelvis  Workstation performed: [id number]     IR biopsy lung    Result Date: 5/8/2023  Narrative: PROCEDURE: CT-guided right upper lung nodule biopsy Right anterior 8 Divehi chest tube placement  STAFF: Divya GREGORY  DOSE LENGTH PRODUCT: 0760 mGy-cm  This examination, like all CT scans performed in the [facility], was performed utilizing techniques to minimize radiation dose exposure, including the use of iterative reconstruction and automated exposure control  NUMBER OF IMAGES: Multiple  COMPLICATIONS: None  MEDICATIONS: Local lidocaine  Moderate sedation with fentanyl and Versed was monitored by radiology nursing staff  Monitoring of conscious sedation totaled 75 minutes  INDICATION: Right upper lung nodule identified on recent comparison CT  Plan for CT-guided biopsy  PROCEDURE: Under intermittent CT guidance, a 17-gauge coaxial needle was advanced into the right upper lung nodule via a right anterior approach  Under intermittent CT guidance, a total of two  1 3   cm 18g  core biopsies were obtained  Postprocedural CT was obtained which identified a large pneumothorax which had considerably expanded after trocar removal  Under intermittent CT guidance, a Yueh needle was advanced into the pneumothorax via a right anterior approach   An Amplatz wire was placed through the needle, after which the tract was dilated and an 8 Martiniquais chest tube was placed  Position confirmed with post placement CT  FINDINGS: 1  Pre-procedural CT showed a right upper lung lobe nodule, consistent with the comparison CT    2   Intra-procedural CT confirmed good positioning of the biopsy needle within the periphery of the lesion  3   The in-room pathology team confirmed adequacy of the second sample  4   Biopsy complicated by a large pneumothorax, 8 Martiniquais chest tube placement in appropriate position as above  Impression: CT-guided right upper lung nodule biopsy  Only the second sample yielded solid tissue, however fortunately this was deemed lesional by pathology  Biopsy complicated by large pneumothorax, successful CT-guided 8 Martiniquais right anterior chest tube placement  Workstation performed: [id number]       Labs:   Lab Results   Component Value Date    WBC 10 63 (H) 05/13/2023    HGB 11 6 (L) 05/13/2023    HCT 37 2 05/13/2023    MCV 94 05/13/2023     05/13/2023     Lab Results   Component Value Date    K 4 1 05/11/2023     05/11/2023    CO2 18 (L) 05/11/2023    BUN 26 (H) 05/11/2023    CREATININE 1 37 (H) 05/11/2023    GLUF 75 02/27/2023    CALCIUM 9 0 05/11/2023    CORRECTEDCA 9 4 09/03/2022    AST 12 (L) 05/11/2023    ALT 7 05/11/2023    ALKPHOS 79 05/11/2023    EGFR 50 05/11/2023       Lab Results   Component Value Date    IRON 27 (L) 05/13/2023    TIBC 291 05/13/2023    FERRITIN 52 05/13/2023       No results found for: [id number]      ROS: Review of Systems   Constitutional: Negative  Negative for appetite change, chills, diaphoresis, fatigue, fever and unexpected weight change  HENT:   Negative for hearing loss, lump/mass, mouth sores, nosebleeds, sore throat, trouble swallowing and voice change  Eyes: Negative  Negative for eye problems and icterus  Respiratory: Positive for cough, shortness of breath and wheezing   Negative for chest tightness and hemoptysis  Cardiovascular: Negative for chest pain and leg swelling  Gastrointestinal: Negative for abdominal distention, abdominal pain, blood in stool, constipation, diarrhea and nausea  Endocrine: Negative  Genitourinary: Negative for dysuria, frequency, hematuria and pelvic pain  Musculoskeletal: Positive for arthralgias and back pain  Negative for flank pain, gait problem, myalgias and neck stiffness  Skin: Negative for itching and rash  Neurological: Negative for dizziness, gait problem, headaches, light-headedness, numbness and speech difficulty  Hematological: Negative for adenopathy  Does not bruise/bleed easily  Psychiatric/Behavioral: Negative for confusion, decreased concentration, depression and sleep disturbance  The patient is not nervous/anxious  Current Medications: Reviewed  Allergies: Reviewed  PMH/FH/SH:  Reviewed      Physical Exam:    Body surface area is 1 76 meters squared  Wt Readings from Last 3 Encounters:   05/18/23 67 1 kg (148 lb)   05/08/23 67 8 kg (149 lb 7 6 oz)   05/01/23 65 8 kg (145 lb)        Temp Readings from Last 3 Encounters:   05/18/23 (!) 97 3 °F (36 3 °C) (Temporal)   05/11/23 97 9 °F (36 6 °C) (Temporal)   05/01/23 98 5 °F (36 9 °C)        BP Readings from Last 3 Encounters:   05/18/23 148/78   05/11/23 155/72   05/01/23 144/66         Pulse Readings from Last 3 Encounters:   05/18/23 68   05/11/23 82   05/01/23 70        Physical Exam  Constitutional:       Appearance: He is well-developed  HENT:      Head: Normocephalic  Eyes:      Pupils: Pupils are equal, round, and reactive to light  Cardiovascular:      Rate and Rhythm: Normal rate  Pulmonary:      Breath sounds: Wheezing and rhonchi present  Abdominal:      General: There is no distension  Tenderness: There is no abdominal tenderness  Musculoskeletal:      Cervical back: Normal range of motion  Right lower leg: No edema  Left lower leg: No edema  Skin:     Findings: No erythema or rash  ECO    Goals and Barriers:  Current Goal: Minimize effects of disease  Barriers: None  Patient's Capacity to Self Care:  Patient is able to self care      Code Status: @[facility]@

## 2023-05-23 ENCOUNTER — PATIENT OUTREACH (OUTPATIENT)
Dept: HEMATOLOGY ONCOLOGY | Facility: CLINIC | Age: 73
End: 2023-05-23

## 2023-05-23 NOTE — PROGRESS NOTES
Biopsychosocial and Barriers Assessment    Cancer Diagnosis:squamous cell carcinoma of the lung  Home/Cell Phone: [phone]  Emergency Contact: Sunny Schuster, daughter  Marital Status:   Interpretation concerns, speaks another language, preferred language: English  Cultural concerns: None reported  Ability to read or write: Fully Literate    Caregiver/Support: Spouse, children, family  Children: 6 children, all supportive  Child/Elder care: quadriplegic son in the home with nursing care    Housing: Own Home  Home Setup: 2 stories  Lives With: spouse and quadriplegic son  Daily Living Activities: pt is able to care for himself  Durable Medical Equipment: No  Ambulation: Pt ambulates independently    Preferred Pharmacy: [facility], [place]  High co-pays with insurance: No  High co-pays with medication coverage: No  No medication coverage: Pt has coverage through the [place]    Primary Care Provider: Turner Prieto MD  Hx of [address]: No  Hx of Short term rehab: No  Mental Health Hx: No  Substance Abuse Hx: Smokes cigarettes and drinks occasionally  Employment: retired  [place] Status/Location: Yes, [facility] [place] to pay bills: Yes  POA/LW/AD: No  Transportation Plan/Concerns: daughter drives the pt and he will consider [employer]      What do you know about your Cancer Diagnosis    What has your doctor told you about your cancer diagnosis: Pt is aware of his lung cancer diagnosis  What has your doctor told you about your cancer treatment: Pt states he needs to have a PET scan to determine the next steps for treatment  What specific concerns do you have about your diagnosis and treatment: No concerns were expressed at this time  Have you been made aware of any hair loss associated with treatment: N/A    Additional Comments:    MSW received a referral for this pt from navigation  Outreach was made this day and the pt was open and receptive of this call    The role of the OSW was "described and explained and contact information was provided  When asked about stress, the pt states he has a great deal of stress but \"none of it is related to my cancer  \"  Pt proceeded to share that he has a quadriplegic son that lives with him and the pt's wife is not well as she has COPD  He shared how stressful each of these situations are for him but he does not feel stress with regards to his diagnosis  His son has nurses who are in the home providing care but he helps his wife  Together, they have 6 children, who the pt described as involved and supportive  Pt states that his children help with their brother as well as he and his wife  The pt's daughter has been taking him to all of his appointments  MSW provided information on [employer], however, the pt was not interested at this time  He is aware that he can call if he changes his mind  He is a  and receives all of his medical coverage through the [place]  Pt reports no issues with insurance or finances  At this time, the pt has no OSW issues or concerns  He is aware he can call if his needs should change  Emotional support was provided           "

## 2023-05-24 ENCOUNTER — OFFICE VISIT (OUTPATIENT)
Dept: UROLOGY | Facility: MEDICAL CENTER | Age: 73
End: 2023-05-24

## 2023-05-24 VITALS
WEIGHT: 148 LBS | OXYGEN SATURATION: 99 % | BODY MASS INDEX: 23.78 KG/M2 | SYSTOLIC BLOOD PRESSURE: 130 MMHG | HEIGHT: 66 IN | HEART RATE: 78 BPM | DIASTOLIC BLOOD PRESSURE: 70 MMHG

## 2023-05-24 DIAGNOSIS — Z85.46 HISTORY OF PROSTATE CANCER: Primary | ICD-10-CM

## 2023-05-24 DIAGNOSIS — R97.21 RISING PSA FOLLOWING TREATMENT FOR MALIGNANT NEOPLASM OF PROSTATE: ICD-10-CM

## 2023-05-24 DIAGNOSIS — N39.3 STRESS INCONTINENCE: ICD-10-CM

## 2023-05-24 DIAGNOSIS — N32.0 BLADDER NECK CONTRACTURE: ICD-10-CM

## 2023-05-24 DIAGNOSIS — Z90.79 HISTORY OF PROSTATECTOMY: ICD-10-CM

## 2023-05-24 DIAGNOSIS — N39.0 RECURRENT UTI: ICD-10-CM

## 2023-05-24 LAB
SL AMB  POCT GLUCOSE, UA: NORMAL
SL AMB LEUKOCYTE ESTERASE,UA: 5.5
SL AMB POCT BILIRUBIN,UA: NORMAL
SL AMB POCT BLOOD,UA: NORMAL
SL AMB POCT CLARITY,UA: CLEAR
SL AMB POCT COLOR,UA: YELLOW
SL AMB POCT KETONES,UA: NORMAL
SL AMB POCT NITRITE,UA: NORMAL
SL AMB POCT PH,UA: 1.01
SL AMB POCT SPECIFIC GRAVITY,UA: 1
SL AMB POCT URINE PROTEIN: NORMAL
SL AMB POCT UROBILINOGEN: 0.2

## 2023-05-24 NOTE — PROGRESS NOTES
5/24/2023      Chief Complaint   Patient presents with   • Bladder Neck Contracture   • Prostate Cancer     [facility]         Assessment and Plan    68 y o  male managed by Dr Ramirez Mis     1  Bladder neck contracture   • S/p dilation of BNC on 9/13/22  • CIC once a day to ensure patency  • AUA score: 16  • Denies issues passing his straight catheterization  Is able to void well on his own the remainder of the day with a good stream   • Stress urinary incontinence persists  See plan below       2  Stress incontinence  • At patient's baseline  • Condom catheters do not work well for him  • Reviewed options including PFPT or indwelling catheter/SPT  Patient declines both at this time  • Instructed to call at any time if he wishes to pursue any additional treatment       3  Prostate cancer   • S/p prostatectomy in 2005   • PSA: 0 3 (2/15/23)  • Previous PSAs: 0 2 (2/24/22), 0 19 (9/9/19)  • Rising PSA  Recently diagnosed with lung cancer, has PET scan ordered for 6/13/23  Any evidence of prostatic mets to be evaluated via PET scan  Reviewed with patient that at his current PSA level there usually are not reflective findings of mets on imaging  • PSA ordered for 6 months from previous  Currently on 1 yr follow up list but f/u sooner if needed  History of Present Illness  Inna Nguyen is a 68 y o  male here for evaluation of her neck contracture, stress incontinence, and prostate cancer  Patient is s/p prostatectomy in 2005  He has had slow rise in his PSA over the past in recent years  Patient was recently diagnosed with lung cancer and has a PET scan ordered in June to determine staging  Is a history of bladder neck contracture which was dilated in the OR in September 2022  Patient passes a straight cath once per day to ensure patency  He denies any difficulty passing the catheter and states he is still voiding well on his own for the remainder of the day  He denies any dysuria or gross hematuria    His "stress incontinence remains at his baseline  Patient denies any fevers or chills  He is agreeable to plan above  AUA SYMPTOM SCORE    Flowsheet Row Most Recent Value   AUA SYMPTOM SCORE    How often have you had a sensation of not emptying your bladder completely after you finished urinating? 0 (P)     How often have you had to urinate again less than two hours after you finished urinating? 5 (P)     How often have you found you stopped and started again several times when you urinate? 5 (P)     How often have you found it difficult to postpone urination? 5 (P)     How often have you had a weak urinary stream? 0 (P)     How often have you had to push or strain to begin urination? 0 (P)     How many times did you most typically get up to urinate from the time you went to bed at night until the time you got up in the morning? 1 (P)     Quality of Life: If you were to spend the rest of your life with your urinary condition just the way it is now, how would you feel about that? 4 (P)     AUA SYMPTOM SCORE 16 (P)            Vitals  Vitals:    05/24/23 0922   BP: 130/70   BP Location: Left arm   Patient Position: Sitting   Cuff Size: Large   Pulse: 78   SpO2: 99%   Weight: 67 1 kg (148 lb)   Height: 5' 6\" (1 676 m)       Physical Exam  Vitals reviewed  Constitutional:       General: He is not in acute distress  Appearance: Normal appearance  He is normal weight  He is not ill-appearing, toxic-appearing or diaphoretic  HENT:      Head: Normocephalic and atraumatic  Eyes:      Conjunctiva/sclera: Conjunctivae normal    Pulmonary:      Effort: Pulmonary effort is normal  No respiratory distress  Abdominal:      General: There is no distension  Palpations: Abdomen is soft  Tenderness: There is no abdominal tenderness  There is no right CVA tenderness, left CVA tenderness, guarding or rebound  Musculoskeletal:         General: Normal range of motion  Cervical back: Normal range of motion   " Skin:     General: Skin is warm and dry  Neurological:      General: No focal deficit present  Mental Status: He is alert and oriented to person, place, and time  Psychiatric:         Mood and Affect: Mood normal          Behavior: Behavior normal          Thought Content: Thought content normal          Judgment: Judgment normal            Past History  Past Medical History:   Diagnosis Date   • Anemia    • Arthritis    • Cancer ([facility] )     prostate   • COPD (chronic obstructive pulmonary disease) ([facility] )    • History of transfusion 2005    auto transfusion   • Hyperlipidemia    • Hypertension    • Leaky heart valve    • Lung nodules     unsure of dx   • PVD (peripheral vascular disease) ([facility])    • Tobacco abuse    • UTI (urinary tract infection)      Social History     Socioeconomic History   • Marital status: /Civil Union     Spouse name: None   • Number of children: None   • Years of education: None   • Highest education level: None   Occupational History   • None   Tobacco Use   • Smoking status: Every Day     Packs/day: 1 00     Years: 60 00     Total pack years: 60 00     Types: Cigarettes   • Smokeless tobacco: Never   • Tobacco comments:     One cigarette at 0400 5/8   Vaping Use   • Vaping Use: Never used   Substance and Sexual Activity   • Alcohol use:  Yes     Alcohol/week: 6 0 standard drinks of alcohol     Types: 6 Cans of beer per week   • Drug use: Never   • Sexual activity: Not Currently   Other Topics Concern   • None   Social History Narrative   • None     Social Determinants of Health     Financial Resource Strain: Not on file   Food Insecurity: Not on file   Transportation Needs: Not on file   Physical Activity: Not on file   Stress: Not on file   Social Connections: Not on file   Intimate Partner Violence: Not on file   Housing Stability: Unknown (2/28/2023)    Housing Stability Vital Sign    • Unable to Pay for Housing in the Last Year: Not on file    • Number of Places Lived in the Last Year: Yes    • Unstable Housing in the Last Year: Not on file     Social History     Tobacco Use   Smoking Status Every Day   • Packs/day: 1 00   • Years: 60 00   • Total pack years: 60 00   • Types: Cigarettes   Smokeless Tobacco Never   Tobacco Comments    One cigarette at 0400 5/8     No family history on file      The following portions of the patient's history were reviewed and updated as appropriate: allergies, current medications, past medical history, past social history, past surgical history and problem list     Results  Recent Results (from the past 1 hour(s))   POCT urine dip auto non-scope    Collection Time: 05/24/23  9:29 AM   Result Value Ref Range     COLOR,UA yellow     CLARITY,UA clear     SPECIFIC GRAVITY,UA 1      PH,UA 1 010     LEUKOCYTE ESTERASE,UA 5 5     NITRITE,UA neg     GLUCOSE, UA neg     KETONES,UA neg     BILIRUBIN,UA neg     BLOOD,UA neg     POCT URINE PROTEIN neg     SL AMB POCT UROBILINOGEN 0 2    ]  Lab Results   Component Value Date    PSA 0 3 02/15/2023    PSA 0 2 02/24/2022     Lab Results   Component Value Date    BUN 26 (H) 05/11/2023    CALCIUM 9 0 05/11/2023     05/11/2023    CO2 18 (L) 05/11/2023    CREATININE 1 37 (H) 05/11/2023    K 4 1 05/11/2023     Lab Results   Component Value Date    HCT 37 2 05/13/2023    HGB 11 6 (L) 05/13/2023    MCV 94 05/13/2023     05/13/2023    WBC 10 63 (H) 05/13/2023     Tripp Quevedo PA-C

## 2023-05-25 ENCOUNTER — PATIENT OUTREACH (OUTPATIENT)
Dept: HEMATOLOGY ONCOLOGY | Facility: CLINIC | Age: 73
End: 2023-05-25

## 2023-05-25 NOTE — PROGRESS NOTES
KIT reached out to Baljit Ponce to see if PET Scan needed prior authorization since patient already had Authorization from [place] in chart referral [id number]  Avinash San confirmed that this is authorization for the PET scan  Fátima Vega reached out to patient to see if he would like PET scan moved up if possible, patient agreed  PET scan currently scheduled for 6/13/23  KIT reached out to [employer], spoke with Sagar Alarcon states that is the first available at [address] [place] [address]  Patient must go to [address] [place] [address] since he had PET scan done there before  States she can reach out to management to see if they can provide earlier date  Advised I will reach out to Dr Elen Brantley to see if he would like PET sooner  ONN left message for patient that I will call him tomorrow as I could not move up PET scan at this time

## 2023-05-26 ENCOUNTER — PATIENT OUTREACH (OUTPATIENT)
Dept: HEMATOLOGY ONCOLOGY | Facility: CLINIC | Age: 73
End: 2023-05-26

## 2023-05-26 NOTE — PROGRESS NOTES
Reached out to [facility] - advised it was appropriated to move up PET CT  Placed call to central scheduling spoke with Lory Zambrano states she does not have an earlier appointment at [facility]  ONN asked to be transferred to PET department  Spoke with Lory Zambrano in PET department who scheduled patient for 6/6/23 at 7am   Patient aware of new date and time  Aware no strenuous activity day before and nothing to eat or drink 6 hours prior to appointment  Advised they will also call and give him instructions  Patient very appreciative that appointment was moved to earlier date  Advised he can reach out with any questions or concerns  Verbalized understanding

## 2023-06-06 ENCOUNTER — HOSPITAL ENCOUNTER (OUTPATIENT)
Dept: NUCLEAR MEDICINE | Facility: HOSPITAL | Age: 73
Discharge: HOME | End: 2023-06-06
Attending: INTERNAL MEDICINE
Payer: COMMERCIAL

## 2023-06-06 DIAGNOSIS — C34.91 SQUAMOUS CELL CARCINOMA OF LUNG, RIGHT (HCC): ICD-10-CM

## 2023-06-06 LAB — GLUCOSE SERPL-MCNC: 95 MG/DL (ref 65–140)

## 2023-06-06 PROCEDURE — 82948 REAGENT STRIP/BLOOD GLUCOSE: CPT

## 2023-06-06 PROCEDURE — G1004 CDSM NDSC: HCPCS

## 2023-06-06 PROCEDURE — 78815 PET IMAGE W/CT SKULL-THIGH: CPT

## 2023-06-06 PROCEDURE — A9552 F18 FDG: HCPCS

## 2023-06-08 ENCOUNTER — DOCUMENTATION (OUTPATIENT)
Dept: HEMATOLOGY ONCOLOGY | Facility: CLINIC | Age: 73
End: 2023-06-08

## 2023-06-08 NOTE — PROGRESS NOTES
Chart Review: Patient was to have PET/ CT done and to be presented on next thoracic tumor board once done, Per Dr Kendra Quevedo note  PET/ CT was moved up and recently done on 6/6  Patient to be discussed on Monday 6/12  Message sent to thoracic Tumor board pool

## 2023-06-08 NOTE — PROGRESS NOTES
In-basket message received from [place] to add patient to thoracic [facility] on 6/12/23 but case list is full  Per Shanice Barbour RN place patient on next available scheduled on 6/19/2023  Elisa Mazariegos Chart reviewed and prep completed

## 2023-06-09 ENCOUNTER — DOCUMENTATION (OUTPATIENT)
Dept: HEMATOLOGY ONCOLOGY | Facility: CLINIC | Age: 73
End: 2023-06-09

## 2023-06-09 NOTE — PROGRESS NOTES
Chart reviewed in preparation of Thoracic Tumor Conference presentation by Dr Amber Patterson on 6/12/23  Patient has a history of prostate cancer status post prostatectomy in 2005, now recently diagnosed with squamous cell carcinoma of the right upper lobe  He is heavy smoker 1 pack of cigarette daily for at least 50 years, presented with enlarging right upper lobe peripheral lung nodule with possible right hilar involvement on CAT scan

## 2023-06-09 NOTE — PROGRESS NOTES
I was able to move patient's case presentation to this upcoming thoracic [facility] scheduled on 6/12/23

## 2023-06-12 ENCOUNTER — DOCUMENTATION (OUTPATIENT)
Dept: HEMATOLOGY ONCOLOGY | Facility: CLINIC | Age: 73
End: 2023-06-12

## 2023-06-12 NOTE — PROGRESS NOTES
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  He is heavy smoker 1 pack of cigarette daily for at least 50 years, presented with enlarging right upper lobe peripheral lung nodule with possible right hilar involvement on CAT scan  PHYSICIAN RECOMMENDED PLAN: Referral to Thoracic Surgery for EBUS possible mediastinoscopy  Imaging reviewed:   6/6/2023 PET CT- FDG avid right upper lobe lesion in keeping with biopsy-proven right upper lobe squamous cell carcinoma  There is evidence of right hilar and mediastinal FDG avid williams disease  Bilateral mildly hypermetabolic mediastinal, left lateral chest wall, and inguinal lymph nodes       3/18/2023 CT lung screening     Pathology reviewed: No    PFT's reviewed: N/A    Future imaging: None    Referrals: Thoracic Surgery      Team agreed to plan  NCCN guidelines were readily available for review at this discussion    The final treatment plan will be left to the discretion of the patient and the treating physician  DISCLAIMERS:  TO THE TREATING PHYSICIAN:  This conference is a meeting of clinicians from various specialty areas who evaluate and discuss patients for whom a multidisciplinary treatment approach is being considered  Please note that the above opinion was a consensus of the conference attendees and is intended only to assist in quality care of the discussed patient    The responsibility for follow up on the input given during the conference, along with any final decisions regarding plan of care, is that of the patient and the patient's provider  Accordingly, appointments have only been recommended based on this information and have NOT been scheduled unless otherwise noted  TO THE PATIENT:  This summary is a brief record of major aspects of your cancer treatment  You may choose to share a copy with any of your doctors or nurses  However, this is not a detailed or comprehensive record of your care

## 2023-06-14 DIAGNOSIS — C34.91 SQUAMOUS CELL CARCINOMA OF LUNG, RIGHT (HCC): Primary | ICD-10-CM

## 2023-06-14 DIAGNOSIS — C34.11 MALIGNANT NEOPLASM OF UPPER LOBE OF RIGHT LUNG (HCC): ICD-10-CM

## 2023-06-15 ENCOUNTER — TELEPHONE (OUTPATIENT)
Dept: HEMATOLOGY ONCOLOGY | Facility: CLINIC | Age: 73
End: 2023-06-15

## 2023-06-15 ENCOUNTER — OFFICE VISIT (OUTPATIENT)
Dept: HEMATOLOGY ONCOLOGY | Facility: CLINIC | Age: 73
End: 2023-06-15
Payer: COMMERCIAL

## 2023-06-15 VITALS
WEIGHT: 147 LBS | DIASTOLIC BLOOD PRESSURE: 70 MMHG | HEIGHT: 66 IN | SYSTOLIC BLOOD PRESSURE: 150 MMHG | RESPIRATION RATE: 16 BRPM | HEART RATE: 67 BPM | BODY MASS INDEX: 23.63 KG/M2 | OXYGEN SATURATION: 100 % | TEMPERATURE: 97.8 F

## 2023-06-15 DIAGNOSIS — C34.11 MALIGNANT NEOPLASM OF UPPER LOBE OF RIGHT LUNG (HCC): Primary | ICD-10-CM

## 2023-06-15 PROBLEM — J93.9 PNEUMOTHORAX: Status: RESOLVED | Noted: 2023-05-08 | Resolved: 2023-06-15

## 2023-06-15 PROCEDURE — 99215 OFFICE O/P EST HI 40 MIN: CPT | Performed by: INTERNAL MEDICINE

## 2023-06-15 NOTE — PROGRESS NOTES
Hematology Outpatient Follow - Up Note  Rustam Galvez 68 y o  male MRN: @ Encounter: [id number]        Date:  6/15/2023        Assessment/ Plan:      Squamous cell carcinoma of the right upper lobe of the lung with hilar and mediastinal involvement by PET scan in June 2023    This is stage IIIa (pT1, N2 disease) by PET scan criteria    His case to be presented in the tumor conference    He has advanced COPD    He will meet with thoracic surgery to determine if he is candidate for neoadjuvant therapy followed by surgical resection versus concurrent radiation/chemo followed by durvalumab    I had long discussion with the patient and his wife, he is not sure that he will do any treatment, at this time    Navigator to reach out to the patient family after his case presented in the tumor conference      Labs and imaging studies are reviewed by ordering provider once results are available  If there are findings that need immediate attention, you will be contacted when results available  Discussing results and the implication on your healthcare is best discussed in person at your follow-up visit         HPI:    79-year-old  male with history of prostate cancer status post radical prostatectomy in 2005 with severe stress incontinence, bladder neck contracture status post dilation with intermittent self-catheterization, history of GI bleed, peripheral vascular disease, atherosclerosis, he smokes 1 pack of red daily for many many years he drinks alcohol occasionally, CT lung screening on 8/3/2023 showed 1 3 cm right upper lobe spiculated nodule very suspicious     Status post lung biopsy by IR on 5/8/2023 showing squamous cell carcinoma of the lung, complicated by pneumothorax     CT scan of the chest abdomen and pelvis on 5/10/2023 showed 1 5 x 1 1 cm right upper lobe lung mass with a groundglass changes, 0 7 x 0 8 cm right lower lobe nodule is unchanged from August 2019, enlarged right hilar lymph node measuring 2 1 x 1 4 cm, no evidence of disease in the abdomen or pelvis  Interval History:        Previous Treatment:         Test Results:    Imaging: NM PET CT skull base to mid thigh    Result Date: 6/6/2023  Narrative: PET/CT SCAN INDICATION: C34 91: Malignant neoplasm of unspecified part of right bronchus or lung Right upper lobe squamous cell carcinoma, right hilar adenopathy  MODIFIER: PI COMPARISON: PET/CT 1/3/2020, CT chest abdomen and pelvis 5/10/2023 CELL TYPE: Squamous cell carcinoma of the right upper lobe TECHNIQUE:   8 6 mCi F-18-FDG administered IV  Multiplanar attenuation corrected and non attenuation corrected PET images are available for interpretation, and contiguous, low dose, axial CT sections were obtained from the skull base through the femurs  Intravenous contrast material was not utilized  This examination, like all CT scans performed in the [place], was performed utilizing techniques to minimize radiation dose exposure, including the use of iterative reconstruction and automated exposure control  Fasting serum glucose: 95 mg/dl FINDINGS: VISUALIZED BRAIN: No acute abnormalities are seen  HEAD/NECK: There is a physiologic distribution of FDG  No FDG avid cervical adenopathy is seen  CT images: Unremarkable  CHEST: -The right upper lobe irregularly marginated biopsy-proven lesion with linear extension to the lateral pleural surface measures approximately 1 3 x 1 1 cm image 73 with SUV max 15 0 - Additional right lung nodules have remained stable since 2020 and demonstrate no abnormal FDG activity consistent with benign findings - As on recent CT, right hilar enlargement with lymph node short axis diameter of approximately 1 2 cm and SUV max of 9 9 - Several additional mildly hypermetabolic lymph nodes are seen in the more inferior right hilum  - FDG-avid right paratracheal lymph node short axis diameter 0 8 cm SUV max 5 3   Similar size anterior mediastinal lymph node on image 63 with SUV max 2 7  There is also mildly increased glucose activity within a right subcarinal lymph node and shotty AP  window lymph nodes - Bilateral mildly hypermetabolic axillary lymph nodes  For example on the left, image 64, short axis diameter 0 7 cm, SUV max 3 3 and on the right, 0 6 cm on image 65 with SUV max 2 6  Subcutaneous nodule adjacent to the lateral left latissimus dorsi muscle on image 80 short axis diameter 0 7 cm SUV max 2 2 CT images: There is no pleural or pericardial effusion  Ascending thoracic aortic diameter of 3 7 cm in upper range of normal  There is also calcification in the region of the aortic valve  ABDOMEN: No evidence of FDG avid soft tissue metastasis  CT images: Rotational anomaly of right kidney with pelvis facing anteriorly  Bilateral renal calcifications which may be vascular  Infrarenal abdominal aortic aneurysm is within normal limits at 2 4 x 2 1 cm but with thickening of the left lateral wall demonstrating increased FDG activity SUV max 4 8  Wall thickening has mildly increased since prior PET/CT, prior SUV max was 5 2  This likely represents advancing inflammatory atherosclerotic change  There is colonic diverticulosis  PELVIS: No evidence of FDG avid soft tissue metastasis  Scattered foci of increased activity are noted associated with the thick walled diverticular sigmoid colon, for example with SUV max on image 174 6 3  Shotty bilateral inguinal lymph nodes, mildly hypermetabolic, for example on the left with SUV max 3 3  CT images: Postsurgical changes  No additional acute findings OSSEOUS STRUCTURES: No FDG avid lesions are seen  CT images: No significant findings  Impression: 1  FDG avid right upper lobe lesion in keeping with biopsy-proven right upper lobe squamous cell carcinoma 2  There is evidence of right hilar and mediastinal FDG avid williams disease 3  Bilateral mildly hypermetabolic mediastinal, left lateral chest wall, and inguinal lymph nodes   These "findings are nonspecific, and should be reassessed during the course of follow-up 4  There is otherwise no evidence of FDG avid metastatic disease involving the abdomen, pelvis or skeleton 5  Thick-walled sigmoid colon with multiple diverticula and foci of increased FDG activity  Findings may be related to severe diverticulosis although would correlate for any clinical symptomatology of mild diverticulitis Workstation performed: [id number]       Labs:   Lab Results   Component Value Date    HCT 37 2 05/13/2023    HGB 11 6 (L) 05/13/2023    MCV 94 05/13/2023     05/13/2023    WBC 10 63 (H) 05/13/2023     Lab Results   Component Value Date    ALKPHOS 79 05/11/2023    ALT 7 05/11/2023    AST 12 (L) 05/11/2023    BUN 26 (H) 05/11/2023    CALCIUM 9 0 05/11/2023     05/11/2023    CO2 18 (L) 05/11/2023    CORRECTEDCA 9 4 09/03/2022    CREATININE 1 37 (H) 05/11/2023    EGFR 50 05/11/2023    GLUF 75 02/27/2023    K 4 1 05/11/2023       Lab Results   Component Value Date    FERRITIN 52 05/13/2023    IRON 27 (L) 05/13/2023    TIBC 291 05/13/2023       No results found for: \"[id number]\"      ROS: Review of Systems   Constitutional: Negative  Negative for appetite change, chills, diaphoresis, fatigue, fever and unexpected weight change  HENT:   Negative for hearing loss, lump/mass, mouth sores, nosebleeds, sore throat, trouble swallowing and voice change  Eyes: Negative  Negative for eye problems and icterus  Respiratory: Positive for cough  Negative for chest tightness, hemoptysis and shortness of breath  Cardiovascular: Negative for chest pain and leg swelling  Gastrointestinal: Negative for abdominal distention, abdominal pain, blood in stool, constipation, diarrhea and nausea  Endocrine: Negative  Genitourinary: Negative for dysuria, frequency, hematuria and pelvic pain  Musculoskeletal: Negative  Negative for arthralgias, back pain, flank pain, gait problem, myalgias and neck stiffness   " Skin: Negative for itching and rash  Neurological: Negative for dizziness, gait problem, headaches, light-headedness, numbness and speech difficulty  Hematological: Negative for adenopathy  Does not bruise/bleed easily  Psychiatric/Behavioral: Negative for confusion, decreased concentration, depression and sleep disturbance  The patient is not nervous/anxious  Current Medications: Reviewed  Allergies: Reviewed  PMH/FH/SH:  Reviewed      Physical Exam:    Body surface area is 1 76 meters squared  Wt Readings from Last 3 Encounters:   06/15/23 66 7 kg (147 lb)   05/24/23 67 1 kg (148 lb)   05/18/23 67 1 kg (148 lb)        Temp Readings from Last 3 Encounters:   06/15/23 97 8 °F (36 6 °C) (Temporal)   05/18/23 (!) 97 3 °F (36 3 °C) (Temporal)   05/11/23 97 9 °F (36 6 °C) (Temporal)        BP Readings from Last 3 Encounters:   06/15/23 150/70   05/24/23 130/70   05/18/23 148/78         Pulse Readings from Last 3 Encounters:   06/15/23 67   05/24/23 78   05/18/23 68        Physical Exam  Vitals reviewed  Constitutional:       General: He is not in acute distress  Appearance: He is well-developed  He is ill-appearing  He is not diaphoretic  HENT:      Head: Normocephalic and atraumatic  Eyes:      Conjunctiva/sclera: Conjunctivae normal    Neck:      Trachea: No tracheal deviation  Cardiovascular:      Rate and Rhythm: Normal rate and regular rhythm  Heart sounds: No murmur heard  No friction rub  No gallop  Pulmonary:      Effort: Pulmonary effort is normal  No respiratory distress  Breath sounds: Normal breath sounds  No wheezing or rales  Chest:      Chest wall: No tenderness  Abdominal:      General: There is no distension  Palpations: Abdomen is soft  Tenderness: There is no abdominal tenderness  Musculoskeletal:      Cervical back: Normal range of motion and neck supple  Lymphadenopathy:      Cervical: No cervical adenopathy     Skin:     General: Skin is warm and dry  Coloration: Skin is not pale  Findings: No erythema  Neurological:      Mental Status: He is alert and oriented to person, place, and time  Psychiatric:         Behavior: Behavior normal          Thought Content: Thought content normal          Judgment: Judgment normal          ECO  Goals and Barriers:  Current Goal: Minimize effects of disease  Barriers: None  Patient's Capacity to Self Care:  Patient is able to self care      Code Status: @[facility]@

## 2023-06-16 ENCOUNTER — TELEPHONE (OUTPATIENT)
Dept: CARDIAC SURGERY | Facility: CLINIC | Age: 73
End: 2023-06-16

## 2023-06-16 NOTE — TELEPHONE ENCOUNTER
LVM for pt requesting a call back in regards to rescheduling appointment with Dr Julia Arguello  Pt canceled his NP appointment via MyChart and I was calling to see if he will like to reschedule at this time

## 2023-06-19 ENCOUNTER — TELEPHONE (OUTPATIENT)
Dept: HEMATOLOGY ONCOLOGY | Facility: CLINIC | Age: 73
End: 2023-06-19

## 2023-06-19 NOTE — TELEPHONE ENCOUNTER
Patient called in to let us know that he does not want any treatment  I thanked him for letting us know

## 2023-07-05 ENCOUNTER — DOCUMENTATION (OUTPATIENT)
Dept: HEMATOLOGY ONCOLOGY | Facility: CLINIC | Age: 73
End: 2023-07-05

## 2023-07-05 NOTE — PROGRESS NOTES
Chart Review: Per notes in pts chart. Pt has denied any further treatments. Therefore Oncology Care Coordination team no longer needed. Care team updated.

## 2023-07-27 ENCOUNTER — HOSPITAL ENCOUNTER (OUTPATIENT)
Dept: NON INVASIVE DIAGNOSTICS | Facility: CLINIC | Age: 73
Discharge: HOME | End: 2023-07-27
Payer: COMMERCIAL

## 2023-07-27 DIAGNOSIS — I77.9 AORTO-ILIAC DISEASE (HCC): Chronic | ICD-10-CM

## 2023-07-27 DIAGNOSIS — I70.213 ATHEROSCLEROSIS OF NATIVE ARTERIES OF EXTREMITIES WITH INTERMITTENT CLAUDICATION, BILATERAL LEGS (HCC): Chronic | ICD-10-CM

## 2023-07-27 PROCEDURE — 93978 VASCULAR STUDY: CPT

## 2023-07-27 PROCEDURE — 93925 LOWER EXTREMITY STUDY: CPT

## 2023-07-27 PROCEDURE — 93923 UPR/LXTR ART STDY 3+ LVLS: CPT

## 2023-07-27 PROCEDURE — 93922 UPR/L XTREMITY ART 2 LEVELS: CPT | Performed by: SURGERY

## 2023-07-27 PROCEDURE — 93925 LOWER EXTREMITY STUDY: CPT | Performed by: SURGERY

## 2023-07-27 PROCEDURE — 93978 VASCULAR STUDY: CPT | Performed by: SURGERY

## 2023-08-01 ENCOUNTER — HOSPITAL ENCOUNTER (EMERGENCY)
Facility: HOSPITAL | Age: 73
Discharge: HOME | End: 2023-08-01
Attending: EMERGENCY MEDICINE
Payer: COMMERCIAL

## 2023-08-01 VITALS
SYSTOLIC BLOOD PRESSURE: 145 MMHG | DIASTOLIC BLOOD PRESSURE: 64 MMHG | TEMPERATURE: 98 F | RESPIRATION RATE: 16 BRPM | HEART RATE: 79 BPM | OXYGEN SATURATION: 98 %

## 2023-08-01 DIAGNOSIS — Z23 NEED FOR TETANUS BOOSTER: ICD-10-CM

## 2023-08-01 DIAGNOSIS — S90.852A FOREIGN BODY IN LEFT FOOT, INITIAL ENCOUNTER: Primary | ICD-10-CM

## 2023-08-01 PROCEDURE — 10120 INC&RMVL FB SUBQ TISS SMPL: CPT | Performed by: PHYSICIAN ASSISTANT

## 2023-08-01 PROCEDURE — 90471 IMMUNIZATION ADMIN: CPT

## 2023-08-01 PROCEDURE — 99284 EMERGENCY DEPT VISIT MOD MDM: CPT

## 2023-08-01 PROCEDURE — 90715 TDAP VACCINE 7 YRS/> IM: CPT | Performed by: PHYSICIAN ASSISTANT

## 2023-08-01 PROCEDURE — 99283 EMERGENCY DEPT VISIT LOW MDM: CPT | Performed by: PHYSICIAN ASSISTANT

## 2023-08-01 RX ADMIN — TETANUS TOXOID, REDUCED DIPHTHERIA TOXOID AND ACELLULAR PERTUSSIS VACCINE, ADSORBED 0.5 ML: 5; 2.5; 8; 8; 2.5 SUSPENSION INTRAMUSCULAR at 12:19

## 2023-08-01 NOTE — ED PROVIDER NOTES
History  Chief Complaint   Patient presents with   • Foot Pain     Stepped on a piece of wood yesterday, and it went thru foot. Pt thinks he might still have a piece of wood stuck in foot. Pt not utd on tetanus. Patient is a 40-year-old male presenting to the ED for evaluation of a foreign body in the left foot. Patient states that he stepped on a piece of wood yesterday and a piece of it got into the bottom of his left foot. He states that he was able to remove most of it but believes that there is still a piece lodged in the plantar aspect of the foot. He denies any fevers, chills or erythema surrounding the wound. He does not know the date of his last tetanus immunization. Prior to Admission Medications   Prescriptions Last Dose Informant Patient Reported? Taking?    albuterol (PROVENTIL HFA,VENTOLIN HFA) 90 mcg/act inhaler  Self Yes Yes   Sig: INHALE 2 PUFFS BY MOUTH EVERY FOUR HOURS HRLY AS NEEDED FOR BREATHING   cholecalciferol (VITAMIN D3) 1,000 units tablet  Self Yes Yes   Sig: Take 1,000 Units by mouth daily at bedtime   ferrous sulfate 325 (65 Fe) mg tablet  Self Yes Yes   Sig: Take 325 mg by mouth daily at bedtime   losartan (COZAAR) 100 MG tablet  Self Yes Yes   Sig: TAKE ONE-HALF TABLET BY MOUTH EVERY DAY FOR BLOOD PRESSURE/HEART   pravastatin (PRAVACHOL) 80 mg tablet  Self Yes Yes   Sig: TAKE 40MG (ONE-HALF TABLET) BY MOUTH EVERY DAY AT 5 PM FOR CHOLESTEROL      Facility-Administered Medications: None       Past Medical History:   Diagnosis Date   • Anemia    • Arthritis    • Cancer (HCC)     prostate   • COPD (chronic obstructive pulmonary disease) (HCC)    • History of transfusion 2005    auto transfusion   • Hyperlipidemia    • Hypertension    • Leaky heart valve    • Lung nodules     unsure of dx   • PVD (peripheral vascular disease) (HCC)    • Tobacco abuse    • UTI (urinary tract infection)        Past Surgical History:   Procedure Laterality Date   • COLONOSCOPY     • HERNIA REPAIR      with mesh   • IR BIOPSY LUNG  5/8/2023   • NE CYSTO CALIBRATION DILAT URTL STRIX/STENOSIS N/A 9/13/2022    Procedure: DILATATION URETHRAL, incision bladder neck contracture,cysto; Surgeon: Carlos Alberto Gabriel MD;  Location: [facility];  Service: Urology   • NE TRANSURETHRAL RESECTION BLADDER NECK N/A 3/8/2022    Procedure: RESECTION TRANSURETHRAL BLADDER NECK CONTRACTURE;  Surgeon: Carlos Alberto Gabriel MD;  Location: [facility];  Service: Urology   • PROSTATECTOMY     • TONSILLECTOMY         History reviewed. No pertinent family history. I have reviewed and agree with the history as documented. E-Cigarette/Vaping   • E-Cigarette Use Never User      E-Cigarette/Vaping Substances   • Nicotine No    • THC No    • CBD No    • Flavoring No    • Other No    • Unknown No      Social History     Tobacco Use   • Smoking status: Every Day     Packs/day: 1.00     Years: 60.00     Total pack years: 60.00     Types: Cigarettes   • Smokeless tobacco: Never   • Tobacco comments:     One cigarette at 0400 5/8   Vaping Use   • Vaping Use: Never used   Substance Use Topics   • Alcohol use: Yes     Alcohol/week: 6.0 standard drinks of alcohol     Types: 6 Cans of beer per week   • Drug use: Never       Review of Systems   Constitutional: Negative for chills and fever. HENT: Negative for ear pain and sore throat. Eyes: Negative for pain and visual disturbance. Respiratory: Negative for cough and shortness of breath. Cardiovascular: Negative for chest pain and palpitations. Gastrointestinal: Negative for abdominal pain and vomiting. Genitourinary: Negative for dysuria and hematuria. Musculoskeletal: Positive for arthralgias (left foot pain). Negative for back pain. Skin: Positive for wound (foreign body, left foot). Negative for color change. Neurological: Negative for seizures and syncope. All other systems reviewed and are negative. Physical Exam  Physical Exam  Vitals and nursing note reviewed. Constitutional:       General: He is awake. He is not in acute distress. Appearance: Normal appearance. He is well-developed. He is not ill-appearing or diaphoretic. HENT:      Head: Normocephalic and atraumatic. Right Ear: External ear normal.      Left Ear: External ear normal.      Nose: Nose normal.      Mouth/Throat:      Lips: Pink. Mouth: Mucous membranes are moist.   Eyes:      General: Lids are normal. No scleral icterus. Conjunctiva/sclera: Conjunctivae normal.      Pupils: Pupils are equal, round, and reactive to light. Cardiovascular:      Rate and Rhythm: Normal rate and regular rhythm. Pulses: Normal pulses. Radial pulses are 2+ on the right side and 2+ on the left side. Heart sounds: Normal heart sounds, S1 normal and S2 normal.   Pulmonary:      Effort: Pulmonary effort is normal. No accessory muscle usage. Breath sounds: Normal breath sounds. No stridor. No decreased breath sounds, wheezing, rhonchi or rales. Abdominal:      General: Abdomen is flat. Bowel sounds are normal. There is no distension. Palpations: Abdomen is soft. Tenderness: There is no abdominal tenderness. There is no right CVA tenderness, left CVA tenderness, guarding or rebound. Musculoskeletal:      Cervical back: Full passive range of motion without pain, normal range of motion and neck supple. No signs of trauma. No pain with movement. Normal range of motion. Right lower leg: No edema. Left lower leg: No edema. Feet:    Lymphadenopathy:      Cervical: No cervical adenopathy. Skin:     General: Skin is warm and dry. Capillary Refill: Capillary refill takes less than 2 seconds. Coloration: Skin is not cyanotic, jaundiced or pale. Neurological:      Mental Status: He is alert and oriented to person, place, and time. GCS: GCS eye subscore is 4. GCS verbal subscore is 5. GCS motor subscore is 6.       Cranial Nerves: No dysarthria or facial asymmetry. Gait: Gait normal.   Psychiatric:         Attention and Perception: Attention normal.         Mood and Affect: Mood normal.         Speech: Speech normal.         Behavior: Behavior normal. Behavior is cooperative. Vital Signs  ED Triage Vitals [08/01/23 1151]   Temperature Pulse Respirations Blood Pressure SpO2   98 °F (36.7 °C) 79 16 145/64 98 %      Temp Source Heart Rate Source Patient Position - Orthostatic VS BP Location FiO2 (%)   Oral Monitor Sitting Right arm --      Pain Score       8           Vitals:    08/01/23 1151   BP: 145/64   Pulse: 79   Patient Position - Orthostatic VS: Sitting         Visual Acuity      ED Medications  Medications   tetanus-diphtheria-acellular pertussis (BOOSTRIX) IM injection 0.5 mL (0.5 mL Intramuscular Given 8/1/23 1219)       Diagnostic Studies  Results Reviewed     None                 No orders to display              Procedures  Foreign Body - Embedded    Date/Time: 8/1/2023 12:16 PM    Performed by: Mihir Urena PA-C  Authorized by: Mihir Urena PA-C    Patient location:  ED  Other Assisting Provider: No    Consent:     Consent obtained:  Verbal    Consent given by:  Patient    Risks discussed:  Bleeding, infection, pain, incomplete removal and poor cosmetic result    Alternatives discussed:  No treatment  Universal protocol:     Procedure explained and questions answered to patient or proxy's satisfaction: yes      Patient identity confirmed:  Verbally with patient  Location:     Location:  Foot    Foot location:  L sole    Depth: Intradermal    Tendon involvement:  None  Pre-procedure details:     Imaging:  None    Neurovascular status: intact      Preparation: Patient was prepped and draped in usual sterile fashion    Anesthesia (see MAR for exact dosages): Anesthesia method:  None  Procedure details:     Bloodless field: no      Removal mechanism:   Forceps    Removal Method:  Percutaneous    Procedure complexity: Simple    Foreign bodies recovered:  2    Description:  2 small wooden splinters    Intact foreign body removal: yes    Post-procedure details:     Neurovascular status: intact      Confirmation:  No additional foreign bodies on visualization    Skin closure:  None    Dressing:  Tube gauze (gauze pad with tube gauze used to secure)    Patient tolerance of procedure: Tolerated well, no immediate complications             ED Course                               SBIRT 20yo+    Flowsheet Row Most Recent Value   Initial Alcohol Screen: US AUDIT-C     1. How often do you have a drink containing alcohol? 1 Filed at: 08/01/2023 1208   2. How many drinks containing alcohol do you have on a typical day you are drinking? 1 Filed at: 08/01/2023 1208   3b. FEMALE Any Age, or MALE 65+: How often do you have 4 or more drinks on one occassion? 0 Filed at: 08/01/2023 1208   Audit-C Score 2 Filed at: 08/01/2023 1208   ALEKSANDR: How many times in the past year have you. .. Used an illegal drug or used a prescription medication for non-medical reasons? Never Filed at: 08/01/2023 1208                    Medical Decision Making  Patient is a 40-year-old male presenting to the ED for evaluation of a foreign body in the left foot. A small, superficial foreign body noted to the sole of the left foot. The area was cleaned/disinfected and a superficial layer of skin was gently peeled back using fine tweezer forceps. 2 small wooden splinters were removed. No additional foreign bodies visualized and patient reports improvement of pain. Gauze/gauze roll used to cover the area. Tetanus booster was also administered. Patient was advised to keep the area clean and closely monitor for any signs of infection. The management plan was discussed in detail with the patient at bedside and all questions were answered. Strict ED return instructions were discussed at bedside. Prior to discharge, both verbal and written instructions were provided. We discussed the signs and symptoms that should prompt the patient to return to the ED. All questions were answered and the patient was comfortable with the plan of care and discharged home. The patient agrees to return to the Emergency Department for concerns and/or progression of illness. Foreign body in left foot, initial encounter: acute illness or injury  Need for tetanus booster: self-limited or minor problem  Risk  Prescription drug management. Disposition  Final diagnoses:   Foreign body in left foot, initial encounter   Need for tetanus booster     Time reflects when diagnosis was documented in both MDM as applicable and the Disposition within this note     Time User Action Codes Description Comment    8/1/2023 12:31 PM Elizabeth Stevens Add [L78.698O] Foreign body in left foot, initial encounter     8/1/2023 12:31 PM Elizabeth Stevens Add [Z23] Need for tetanus booster       ED Disposition     ED Disposition   Discharge    Condition   Stable    Date/Time   Tue Aug 1, 2023 12:30 PM    Comment   Elida Nick discharge to home/self care.                Follow-up Information     Follow up With Specialties Details Why Contact Info Additional Information    Jesse Cordova MD Family Medicine Schedule an appointment as soon as possible for a visit   [address] [facility] [phone]       [facility] Emergency Department Emergency Medicine  If symptoms worsen [address] [id number]  [address] [facility] Emergency Department, [address] [place] [address],[address], [id number]          Discharge Medication List as of 8/1/2023 12:32 PM      CONTINUE these medications which have NOT CHANGED    Details   albuterol (PROVENTIL HFA,VENTOLIN HFA) 90 mcg/act inhaler INHALE 2 PUFFS BY MOUTH EVERY FOUR HOURS HRLY AS NEEDED FOR BREATHING, Historical Med      cholecalciferol (VITAMIN D3) 1,000 units tablet Take 1,000 Units by mouth daily at bedtime, Historical Med      ferrous sulfate 325 (65 Fe) mg tablet Take 325 mg by mouth daily at bedtime, Historical Med      losartan (COZAAR) 100 MG tablet TAKE ONE-HALF TABLET BY MOUTH EVERY DAY FOR BLOOD PRESSURE/HEART, Historical Med      pravastatin (PRAVACHOL) 80 mg tablet TAKE 40MG (ONE-HALF TABLET) BY MOUTH EVERY DAY AT 5 PM FOR CHOLESTEROL, Historical Med             No discharge procedures on file.     PDMP Review       Value Time User    PDMP Reviewed  Yes 5/11/2023  9:48 AM Esme Giron PA-C          ED Provider  Electronically Signed by           Otilio Barnes PA-C  08/01/23 0931

## 2023-08-02 ENCOUNTER — OFFICE VISIT (OUTPATIENT)
Dept: HEMATOLOGY ONCOLOGY | Facility: CLINIC | Age: 73
End: 2023-08-02
Payer: COMMERCIAL

## 2023-08-02 VITALS
DIASTOLIC BLOOD PRESSURE: 60 MMHG | TEMPERATURE: 98.3 F | SYSTOLIC BLOOD PRESSURE: 126 MMHG | RESPIRATION RATE: 17 BRPM | BODY MASS INDEX: 23.73 KG/M2 | HEIGHT: 66 IN | OXYGEN SATURATION: 98 % | HEART RATE: 75 BPM

## 2023-08-02 DIAGNOSIS — C34.91 MALIGNANT NEOPLASM OF RIGHT LUNG, UNSPECIFIED PART OF LUNG (HCC): Primary | ICD-10-CM

## 2023-08-02 PROCEDURE — 99214 OFFICE O/P EST MOD 30 MIN: CPT | Performed by: INTERNAL MEDICINE

## 2023-08-02 NOTE — PROGRESS NOTES
Hematology Outpatient Follow - Up Note  Abi Pinto 68 y.o. 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 If there are findings that need immediate attention, you will be contacted when results available. Discussing results and the implication on your healthcare is best discussed in person at your follow-up visit.        HPI:    70-year-old  male with history of prostate cancer status post radical prostatectomy in 2005 with severe stress incontinence, bladder neck contracture status post dilation with intermittent self-catheterization, history of GI bleed, peripheral vascular disease, atherosclerosis, he smokes 1 pack of red daily for many many years he drinks alcohol occasionally, CT lung screening on 8/3/2023 showed 1.3 cm right upper lobe spiculated nodule very suspicious     Status post lung biopsy by IR on 5/8/2023 showing squamous cell carcinoma of the lung, complicated by pneumothorax     CT scan of the chest abdomen and pelvis on 5/10/2023 showed 1.5 x 1.1 cm right upper lobe lung mass with a groundglass changes, 0.7 x 0.8 cm right lower lobe nodule is unchanged from August 2019, enlarged right hilar lymph node measuring 2.1 x 1.4 cm, no evidence of disease in the abdomen or pelvis  Interval History:        Previous Treatment:         Test Results:    Imaging: VAS abdominal aorta/iliacs; complete study    Result Date: 7/27/2023  Narrative:  [facility]: Indications:  Evaluate for aorto-Iliac occlusive disease. Patient reports bilateral calf claudication of 100 yards. Operative History: 2021-04-20 Right Mid SFA PTA 2021-03-25 Left Prox to mid SFA atherectomy Risk Factors The patient has history of HTN, Hyperlipidemia, carotid artery disease, COPD, CKD, PAD and smoking (current). Clinical Right Pressure:  128/ mm Hg, Left Pressure:  133/ mm Hg. FINDINGS:  Unilateral     Impression  PSV (cm/s)  EDV (cm/s)  AP (cm)  Sup-Ayse Ao                         86          27      1.5  Px Inf-Cole Ao                      80          19      2.2  Ds Inf-Cole Ao                      87           0      1.4  Celiac         >70%               260          41           Prox. SMA                         142          24            Right           Impression  PSV (cm/s)  EDV (cm/s)  TRV (cm)   RAR  Prox KAMALA                            99           0       0.6        Dist KAMALA        50-75%             389          36                  Prox. EIA       >75%               549          16                  Dist EIA                           241           0       0.5        Prox Renal                          93          20            1.09   Left            PSV (cm/s)  EDV (cm/s)  TRV (cm)   RAR  Prox KAMALA               100           0       0.8        Dist KAMALA               162           0                  Internal Iliac         127                              Prox. EIA              122           0                  Dist EIA               155          22       0.5        Prox Renal              90          18            1.06     CONCLUSION:  Impression Diffuse atherosclerotic disease is throughout the abdominal aorta which is normal in caliber, 2.16 cm in its greatest diameter.  A 50-75% stenosis is noted in the right distal common iliac artery with diffuse atherosclerotic disease in the remaining portion and a > 75% stenosis of the right proximal external iliac artery with diffuse atherosclerotic disease in the remaining portion. The right common and external iliac arteries are normal in caliber. Diffuse atherosclerotic disease is noted throughout the left common and external iliac arteries and are normal in caliber. 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 The superior mesenteric and proximal bilateral renal arteries are patent. Ankle/Brachial indices are: Right - 0.83, moderate claudication range and Left - 0.39, ischemic range. Great toe pressures: Right 62 mm Hg, above healing threshold for a non-diabetic and Left 22 mm Hg, below healing threshold for a non-diabetic. There is no previous study available for comparison. SIGNATURE: Electronically Signed by: Rhea Gama on 2023-07-27 03:52:11 PM    VAS lower limb arterial duplex, complete bilateral    Result Date: 7/27/2023  Narrative:  [facility]PORT [facility]IN[facility]: Indications: Atherosclerosis of native arteries of extremities with intermittent claudication, bilateral legs [I70.213]. Patient presents with pain in the bilateral calves after walking 100 feet. Denies any open wounds or ulcers at this time. Operative History: 2021-04-20 Right Mid SFA PTA 2021-03-25 Left Prox to mid SFA atherectomy Risk Factors: HTN, Hyperlipidemia, carotid artery disease, COPD, CKD, PAD and smoking (current). Clinical Right Pressure:  128/ mm Hg, Left Pressure:  133/ mm Hg.   FINDINGS:  Segment                Right            Left                                          PSV (cm/s)  EDV  Impression  PSV (cm/s)  EDV  Common Femoral Artery         274    0                     196    0  Prox Profunda                 102    0                     261    0  Prox SFA                      125    0  Occluded           168    1  Mid SFA                       131    0 32    6  Dist SFA                       94    0                      38    8  Proximal Pop                   60    0                      43    0  Distal Pop                     83   10                      34    1  Tibioperoneal                  90                           40       Dist Post Tibial               25    0                      20    0  Dist. Ant. Tibial              35    0                      17    3     CONCLUSION: Impression: RIGHT LOWER LIMB: Diffuse disease noted throughout the femoral-popliteal arteries without significant focal stenosis. Ankle/Brachial index: 0.83, moderate claudication range. PVR/ PPG tracings are dampened. Metatarsal pressure of 109 mm Hg Great toe pressure of 62 mm Hg, above healing threshold for a non-diabetic. LEFT LOWER LIMB: An occlusion of the proximal thigh level of the superficial femoral artery with distal reconstitution and diffuse disease noted throughout the remaining portions of the femoral-popliteal arteries. Findings suggests tibiopreroneal disease. Ankle/Brachial index: 0.39, ischemic range. PVR/ PPG tracings are dampened. Metatarsal pressure of 49 mm Hg Great toe pressure of 22 mm Hg, below healing threshold for a non-diabetic.   SIGNATURE: Electronically Signed by: Juan Cárdenas on 2023-07-27 03:49:19 PM      Labs:   Lab Results   Component Value Date    WBC 10.63 (H) 05/13/2023    HGB 11.6 (L) 05/13/2023    HCT 37.2 05/13/2023    MCV 94 05/13/2023     05/13/2023     Lab Results   Component Value Date    K 4.1 05/11/2023     05/11/2023    CO2 18 (L) 05/11/2023    BUN 26 (H) 05/11/2023    CREATININE 1.37 (H) 05/11/2023    GLUF 75 02/27/2023    CALCIUM 9.0 05/11/2023    CORRECTEDCA 9.4 09/03/2022    AST 12 (L) 05/11/2023    ALT 7 05/11/2023    ALKPHOS 79 05/11/2023    EGFR 50 05/11/2023       Lab Results   Component Value Date    IRON 27 (L) 05/13/2023    TIBC 291 05/13/2023    FERRITIN 52 05/13/2023       No results found for: "Charo Westfall"      ROS: Review of Systems   Constitutional: Negative. Negative for appetite change, chills, diaphoresis, fatigue, fever and unexpected weight change. HENT:   Negative for hearing loss, lump/mass, mouth sores, nosebleeds, sore throat, trouble swallowing and voice change. Eyes: Negative. Negative for eye problems and icterus. Respiratory: Positive for cough and shortness of breath. Negative for chest tightness and hemoptysis. Cardiovascular: Negative for chest pain and leg swelling. Gastrointestinal: Negative for abdominal distention, abdominal pain, blood in stool, constipation, diarrhea and nausea. Endocrine: Negative. Genitourinary: Negative for dysuria, frequency, hematuria and pelvic pain. Musculoskeletal: Negative. Negative for arthralgias, back pain, flank pain, gait problem, myalgias and neck stiffness. Skin: Negative for itching and rash. Neurological: Negative for dizziness, gait problem, headaches, light-headedness, numbness and speech difficulty. Hematological: Negative for adenopathy. Does not bruise/bleed easily. Psychiatric/Behavioral: Negative for confusion, decreased concentration, depression and sleep disturbance. The patient is not nervous/anxious. Current Medications: Reviewed  Allergies: Reviewed  PMH/FH/SH:  Reviewed      Physical Exam:    Body surface area is 1.76 meters squared. Wt Readings from Last 3 Encounters:   06/15/23 66.7 kg (147 lb)   05/24/23 67.1 kg (148 lb)   05/18/23 67.1 kg (148 lb)        Temp Readings from Last 3 Encounters:   08/02/23 98.3 °F (36.8 °C)   08/01/23 98 °F (36.7 °C) (Oral)   06/15/23 97.8 °F (36.6 °C) (Temporal)        BP Readings from Last 3 Encounters:   08/02/23 126/60   08/01/23 145/64   06/15/23 150/70         Pulse Readings from Last 3 Encounters:   08/02/23 75   08/01/23 79   06/15/23 67        Physical Exam  Vitals reviewed. Constitutional:       General: He is not in acute distress. Appearance: He is well-developed. He is ill-appearing. He is not diaphoretic. Comments: On wheelchair   HENT:      Head: Normocephalic and atraumatic. Eyes:      Conjunctiva/sclera: Conjunctivae normal.   Neck:      Trachea: No tracheal deviation. Cardiovascular:      Rate and Rhythm: Normal rate and regular rhythm. Heart sounds: No murmur heard. No friction rub. No gallop. Pulmonary:      Effort: Pulmonary effort is normal. No respiratory distress. Breath sounds: Normal breath sounds. No wheezing or rales. Chest:      Chest wall: No tenderness. Abdominal:      General: There is no distension. Palpations: Abdomen is soft. Tenderness: There is no abdominal tenderness. Musculoskeletal:      Cervical back: Normal range of motion and neck supple. Right lower leg: No edema. Left lower leg: No edema. Lymphadenopathy:      Cervical: No cervical adenopathy. Skin:     General: Skin is warm and dry. Coloration: Skin is not pale. Findings: No erythema. Neurological:      Mental Status: He is alert and oriented to person, place, and time. Psychiatric:         Behavior: Behavior normal.         Thought Content: Thought content normal.         Judgment: Judgment normal.         ECO  Goals and Barriers:  Current Goal: Minimize effects of disease. Barriers: None. Patient's Capacity to Self Care:  Patient is able to self care.     Code Status: [unfilled]

## 2023-08-10 ENCOUNTER — OFFICE VISIT (OUTPATIENT)
Dept: VASCULAR SURGERY | Facility: CLINIC | Age: 73
End: 2023-08-10
Payer: COMMERCIAL

## 2023-08-10 VITALS
DIASTOLIC BLOOD PRESSURE: 54 MMHG | BODY MASS INDEX: 23.3 KG/M2 | HEART RATE: 75 BPM | OXYGEN SATURATION: 100 % | SYSTOLIC BLOOD PRESSURE: 144 MMHG | HEIGHT: 66 IN | WEIGHT: 145 LBS

## 2023-08-10 DIAGNOSIS — I70.213 ATHEROSCLEROSIS OF NATIVE ARTERIES OF EXTREMITIES WITH INTERMITTENT CLAUDICATION, BILATERAL LEGS (HCC): Chronic | ICD-10-CM

## 2023-08-10 DIAGNOSIS — I77.9 AORTO-ILIAC DISEASE (HCC): Primary | Chronic | ICD-10-CM

## 2023-08-10 PROCEDURE — 99213 OFFICE O/P EST LOW 20 MIN: CPT | Performed by: SURGERY

## 2023-08-10 NOTE — PATIENT INSTRUCTIONS
Atherosclerosis of native arteries of extremities with intermittent claudication, bilateral legs (HCC)  Aortoiliac and infrainguinal arterial occlusive disease with bilateral lower extremity claudication, more severe on the left. We discussed the findings of recent duplex evaluation along with the treatment options available. In general his claudication is relatively severe but not disabling. Ultimately in light of his recent diagnosis with stage III lung cancer and his wishes not to pursue further treatment, a conservative approach for his peripheral arterial disease will be pursued. We did discuss the role of an ongoing walking program.  We also discussed the signs and symptoms of worsening disease at which time he would contact our office. He has declined further office follow-up at this point.

## 2023-08-10 NOTE — ASSESSMENT & PLAN NOTE
Aortoiliac and infrainguinal arterial occlusive disease with bilateral lower extremity claudication, more severe on the left. We discussed the findings of recent duplex evaluation along with the treatment options available. In general his claudication is relatively severe but not disabling. Ultimately in light of his recent diagnosis with stage III lung cancer and his wishes not to pursue further treatment, a conservative approach for his peripheral arterial disease will be pursued. We did discuss the role of an ongoing walking program.  We also discussed the signs and symptoms of worsening disease at which time he would contact our office. He has declined further office follow-up at this point.

## 2023-08-10 NOTE — LETTER
August 10, 2023     Ramy Neff MD  [address] [facility] [address]    Patient: Eulogio Vincent   YOB: 1950   Date of Visit: 8/10/2023       Dear Dr. Dave Acevedo: Thank you for referring Eulogio Vincent to me for evaluation. Below are the relevant portions of my assessment and plan of care. Atherosclerosis of native arteries of extremities with intermittent claudication, bilateral legs (HCC)  Aortoiliac and infrainguinal arterial occlusive disease with bilateral lower extremity claudication, more severe on the left. We discussed the findings of recent duplex evaluation along with the treatment options available. In general his claudication is relatively severe but not disabling. Ultimately in light of his recent diagnosis with stage III lung cancer and his wishes not to pursue further treatment, a conservative approach for his peripheral arterial disease will be pursued. We did discuss the role of an ongoing walking program.  We also discussed the signs and symptoms of worsening disease at which time he would contact our office. He has declined further office follow-up at this point. If you have questions, please do not hesitate to call me. I look forward to following Eliot Shah along with you.          Sincerely,        Jadyn Fofana MD        CC: No Recipients

## 2023-08-10 NOTE — PROGRESS NOTES
Assessment/Plan:    Atherosclerosis of native arteries of extremities with intermittent claudication, bilateral legs (HCC)  Aortoiliac and infrainguinal arterial occlusive disease with bilateral lower extremity claudication, more severe on the left. We discussed the findings of recent duplex evaluation along with the treatment options available. In general his claudication is relatively severe but not disabling. Ultimately in light of his recent diagnosis with stage III lung cancer and his wishes not to pursue further treatment, a conservative approach for his peripheral arterial disease will be pursued. We did discuss the role of an ongoing walking program.  We also discussed the signs and symptoms of worsening disease at which time he would contact our office. He has declined further office follow-up at this point. Diagnoses and all orders for this visit:    Aorto-iliac disease ([address])    Atherosclerosis of native arteries of extremities with intermittent claudication, bilateral legs ([address])  -     Ambulatory Referral to Vascular Surgery          Subjective:      Patient ID: Abilio Sánchez is a 68 y.o. male. Patient is here today to review results of a DIANE and AOIL done 7/29/2023. Patient has a history of lung cancer. Patient is s/p right mid SFA PTA done 4/20/2021 and s/p a Left proximal to mid SFA atherectomy done 3/25/2921. Patient c/o of cramping pain in both of his calves after walking about 100 feet. He denies any open wounds or rest pain. Patient is taking Pravastatin. He is a current smoker. 24-year-old former smoker presents in follow-up of aortoiliac and infrainguinal arterial occlusive disease with associated claudication. He again describes discomfort which occurs in both lower extremities after walking approximately 100 feet. This is more severe in the left as compared to the right. This does decrease his activity but does not severely limit his ability to perform his daily activities. He denies rest pain. He evidently recently did step on a splinter which was surgically removed. Subsequently the wound has healed. Aortic and lower extremity arterial duplex 7/27/2023. On the right there is a common iliac artery stenosis of greater than 75%. The femoral-popliteal segment is patent with ankle-brachial index of 0.83. On the left there is a superficial femoral artery occlusion with ankle-brachial index of 0.39. The following portions of the patient's history were reviewed and updated as appropriate: allergies, current medications, past family history, past medical history, past social history, past surgical history and problem list     Past Medical History:  Past Medical History:   Diagnosis Date   • Anemia    • Arthritis    • Cancer ([address])     prostate   • COPD (chronic obstructive pulmonary disease) ([address])    • History of transfusion 2005    auto transfusion   • Hyperlipidemia    • Hypertension    • Leaky heart valve    • Lung nodules     unsure of dx   • PVD (peripheral vascular disease) ([address])    • Tobacco abuse    • UTI (urinary tract infection)        Past Surgical History:  Past Surgical History:   Procedure Laterality Date   • COLONOSCOPY     • HERNIA REPAIR      with mesh   • IR BIOPSY LUNG  5/8/2023   • NJ CYSTO CALIBRATION DILAT URTL STRIX/STENOSIS N/A 9/13/2022    Procedure: DILATATION URETHRAL, incision bladder neck contracture,cysto;   Surgeon: Carlos Alberto Gabriel MD;  Location: [facility];  Service: Urology   • NJ TRANSURETHRAL RESECTION BLADDER NECK N/A 3/8/2022    Procedure: RESECTION TRANSURETHRAL BLADDER NECK CONTRACTURE;  Surgeon: Carlos Alberto Gabriel MD;  Location: [facility];  Service: Urology   • PROSTATECTOMY     • TONSILLECTOMY         Social History:  Social History     Substance and Sexual Activity   Alcohol Use Yes   • Alcohol/week: 6.0 standard drinks of alcohol   • Types: 6 Cans of beer per week     Social History     Substance and Sexual Activity   Drug Use Never     Social History     Tobacco Use   Smoking Status Every Day   • Packs/day: 1.00   • Years: 60.00   • Total pack years: 60.00   • Types: Cigarettes   Smokeless Tobacco Never   Tobacco Comments    One cigarette at 0400 5/8       Family History:  History reviewed. No pertinent family history. Allergies: Allergies   Allergen Reactions   • Aspirin Other (See Comments)     Polyps in sinuses    • Nitrofurantoin GI Intolerance     Stomach cramping    • Sm Non-Asprin Sinus [Pseudoephedrine-Acetaminophen] Other (See Comments)     Patient advised to avoid due to polyps .     • Ciprofloxacin Tachycardia   • Penicillins Rash   • Rosuvastatin Myalgia   • Sulfa Antibiotics Rash       Medications:    Current Outpatient Medications:   •  albuterol (PROVENTIL HFA,VENTOLIN HFA) 90 mcg/act inhaler, INHALE 2 PUFFS BY MOUTH EVERY FOUR HOURS HRLY AS NEEDED FOR BREATHING, Disp: , Rfl:   •  cholecalciferol (VITAMIN D3) 1,000 units tablet, Take 1,000 Units by mouth daily at bedtime, Disp: , Rfl:   •  ferrous sulfate 325 (65 Fe) mg tablet, Take 325 mg by mouth daily at bedtime, Disp: , Rfl:   •  losartan (COZAAR) 100 MG tablet, TAKE ONE-HALF TABLET BY MOUTH EVERY DAY FOR BLOOD PRESSURE/HEART, Disp: , Rfl:   •  pravastatin (PRAVACHOL) 80 mg tablet, TAKE 40MG (ONE-HALF TABLET) BY MOUTH EVERY DAY AT 5 PM FOR CHOLESTEROL, Disp: , Rfl:     Vitals:  /54 (08/10/23 0856)    Temp      Pulse 75 (08/10/23 0856)   Resp      SpO2 100 % (08/10/23 0856)      Lab Results and Cultures:   CBC with diff:   Lab Results   Component Value Date    WBC 10.63 (H) 05/13/2023    HGB 11.6 (L) 05/13/2023    HCT 37.2 05/13/2023    MCV 94 05/13/2023     05/13/2023    RBC 3.94 05/13/2023    MCH 29.4 05/13/2023    MCHC 31.2 (L) 05/13/2023    RDW 13.5 05/13/2023    MPV 9.8 05/13/2023    NRBC 0 05/11/2023   ,   BMP/CMP:  Lab Results   Component Value Date    K 4.1 05/11/2023     05/11/2023    CO2 18 (L) 05/11/2023    BUN 26 (H) 05/11/2023    CREATININE 1.37 (H) 05/11/2023    CALCIUM 9.0 05/11/2023    AST 12 (L) 05/11/2023    ALT 7 05/11/2023    ALKPHOS 79 05/11/2023    EGFR 50 05/11/2023   ,   Lipid Panel: No results found for: "CHOL",   Coags:   Lab Results   Component Value Date    PTT 32 02/26/2023    INR 1.05 02/26/2023   ,     . Review of Systems   Constitutional: Negative. HENT: Negative. Eyes: Negative. Respiratory: Negative. Cardiovascular: Negative. Gastrointestinal: Negative. Endocrine: Negative. Genitourinary: Negative. Musculoskeletal:        Pain in legs when walking   Skin: Negative. Allergic/Immunologic: Negative. Neurological: Negative. Hematological: Negative. Psychiatric/Behavioral: Negative. Objective:      /54 (BP Location: Left arm, Patient Position: Sitting, Cuff Size: Standard)   Pulse 75   Ht 5' 6" (1.676 m)   Wt 65.8 kg (145 lb)   SpO2 100%   BMI 23.40 kg/m²          Physical Exam  Constitutional:       Appearance: Normal appearance. Cardiovascular:      Rate and Rhythm: Normal rate. Pulses:           Dorsalis pedis pulses are 0 on the left side. Posterior tibial pulses are 0 on the left side. Comments: Left foot is pink and warm with intact capillary refill. Musculoskeletal:      Cervical back: Normal range of motion. Skin:     General: Skin is warm and dry. Comments: No evidence of tissue loss with healed wound following splinter on the plantar aspect of the left foot   Neurological:      General: No focal deficit present. Mental Status: He is alert and oriented to person, place, and time. Sensory: No sensory deficit. Motor: No weakness.       Gait: Gait normal.

## 2024-02-21 PROBLEM — Z12.11 COLON CANCER SCREENING: Status: RESOLVED | Noted: 2019-09-16 | Resolved: 2024-02-21

## 2024-05-19 PROBLEM — N18.31 STAGE 3A CHRONIC KIDNEY DISEASE (HCC): Status: ACTIVE | Noted: 2022-06-16

## 2024-09-03 ENCOUNTER — HOSPITAL ENCOUNTER (OUTPATIENT)
Dept: CT IMAGING | Facility: HOSPITAL | Age: 74
Discharge: HOME | End: 2024-09-03
Payer: COMMERCIAL

## 2024-09-03 DIAGNOSIS — C34.90 MALIGNANT NEOPLASM OF LUNG, UNSPECIFIED LATERALITY, UNSPECIFIED PART OF LUNG (HCC): ICD-10-CM

## 2024-09-03 PROCEDURE — 71250 CT THORAX DX C-: CPT

## 2024-10-28 ENCOUNTER — EVALUATION (OUTPATIENT)
Dept: PHYSICAL THERAPY | Facility: CLINIC | Age: 74
End: 2024-10-28
Payer: COMMERCIAL

## 2024-10-28 DIAGNOSIS — M25.512 CHRONIC LEFT SHOULDER PAIN: Primary | ICD-10-CM

## 2024-10-28 DIAGNOSIS — G89.29 CHRONIC LEFT SHOULDER PAIN: Primary | ICD-10-CM

## 2024-10-28 PROCEDURE — 97112 NEUROMUSCULAR REEDUCATION: CPT

## 2024-10-28 PROCEDURE — 97161 PT EVAL LOW COMPLEX 20 MIN: CPT

## 2024-10-28 PROCEDURE — 97110 THERAPEUTIC EXERCISES: CPT

## 2024-10-28 NOTE — LETTER
2024    Albertina Sandhu MD  [address] [facility] [address]    Patient: Johnson Ulloa   YOB: 1950   Date of Visit: 10/28/2024     Encounter Diagnosis     ICD-10-CM    1. Chronic left shoulder pain  M25.512     G89.29           Dear Dr. Sandhu:    Thank you for your recent referral of Johnson Ulloa. Please review the attached evaluation summary from Johnson's recent visit.     Please verify that you agree with the plan of care by signing the attached order.     If you have any questions or concerns, please do not hesitate to call.     I sincerely appreciate the opportunity to share in the care of one of your patients and hope to have another opportunity to work with you in the near future.       Sincerely,    Sergey Bean, PT      Referring Provider:      I certify that I have read the below Plan of Care and certify the need for these services furnished under this plan of treatment while under my care.                    Albertina Sandhu MD  [address] [facility] [place] [address]  Via Fax: [phone]          PT Evaluation     Today's date: 10/28/2024  Patient name: Johnson Ulloa  : 1950  MRN: [id number]  Referring provider: Albertina Sandhu MD  Dx:   Encounter Diagnosis     ICD-10-CM    1. Chronic left shoulder pain  M25.512     G89.29           Start Time: 1550  Stop Time: 1635  Total time in clinic (min): 45 minutes    Assessment  Impairments: abnormal muscle firing, abnormal or restricted ROM, activity intolerance, impaired physical strength, lacks appropriate home exercise program, pain with function, scapular dyskinesis and poor body mechanics  Symptom irritability: moderate    Assessment details: Pt is a 74 y.o. year old male presenting to physical therapy for Chronic left shoulder pain  (primary encounter diagnosis).  He presents with the following impairments: Scapular dyskinesia of both sides with increased  deficits noted L>R, normal b/l shoulder strength and AROM, poor seated T/S posture, decreased b/l rhomboid and serratus anterior strength, scapulae resting in excessive upward rotation, and decreased b/l scapular mobility affecting his function with ADLs, sleeping, getting up in the morning, reaching, lifting, twisting, bending, extending, and functional ability.  Pt will benefit from skilled physical therapy to address functional limitations noted in evaluation and meet patient goals.   Barriers to therapy: Stage III-IV lung cancer.  Understanding of Dx/Px/POC: good     Prognosis: good    Goals  ST. Pt will be independent with HEP.  2. Pt will improve scapular mobility deficits to nil.  3. Pt will improve resting position of scapulae to WNL.  LT. Pt will improve pain when waking up in the morning to 2/10.  2. Pt will improve rhomboid and serratus anterior strength to WNL.  3. Pt will improve seated resting posture to WNL.     Plan  Patient would benefit from: PT eval and skilled physical therapy  Planned modality interventions: biofeedback, manual electrical stimulation, microcurrent electrical stimulation, TENS, electrical stimulation/Russian stimulation, thermotherapy: hydrocollator packs, cryotherapy and unattended electrical stimulation    Planned therapy interventions: abdominal trunk stabilization, joint mobilization, manual therapy, massage, ADL retraining, neuromuscular re-education, body mechanics training, patient education, postural training, strengthening, stretching, therapeutic activities, therapeutic exercise, flexibility, functional ROM exercises and home exercise program    Frequency: 1x week  Duration in weeks: 6  Treatment plan discussed with: patient  Plan details: Once a week due to low energy levels and transportation issues.         Subjective Evaluation    History of Present Illness  Mechanism of injury: Pt presents to the clinic with history of L shoulder pain that started about 3  months ago that is primarily located in his shoulder blade and is not as bad as it was when it started. Pt stated that his pain is the worst in the morning when he wakes up and feels like his back and shoulder blade is being stabbed. Pt stated that it is more in the middle of his back rather than on the shoulder blade itself. Pt stated that his doctor told him that it could be a pinched nerve. Pt stated that he has a history of stage 3-4 lung cancer and does not have a lot of energy. Pt stated that he is typically able to go through all of his normal activities in his routine. Pt stated that he was prescribed oxycodone and tylenol for his pain but it did not help him.   Patient Goals  Patient goals for therapy: increased motion, improved balance, decreased pain, increased strength, independence with ADLs/IADLs and return to sport/leisure activities    Pain  Current pain ratin  At best pain ratin  At worst pain ratin  Quality: sharp, discomfort, knife-like and needle-like          Objective     Palpation   Left   No palpable tenderness to the upper trapezius.   Tenderness of the rhomboids and serratus anterior.     Right   No palpable tenderness to the serratus anterior and upper trapezius.   Tenderness of the rhomboids.     Tenderness   Cervical Spine   Tenderness in the spinous process (T3-T5).     Left Shoulder   Tenderness in the lateral scapula and medial scapula. No tenderness in the AC joint.     Right Shoulder  Tenderness in the medial scapula. No tenderness in the AC joint and lateral scapula.     Active Range of Motion   Cervical/Thoracic Spine       Cervical    Flexion:  WFL  Extension:  with pain Restriction level: minimal  Left lateral flexion:  Restriction level: minimal  Right lateral flexion:  Restriction level minimal  Left rotation:  Restriction level: minimal  Right rotation:  with pain Restriction level: moderate    Thoracic    Flexion:  WFL  Extension:  Restriction level:  "minimal  Left lateral flexion:  WFL  Right lateral flexion:  WFL  Left rotation:  Restriction level: moderate  Right rotation:  Restriction level: minimal  Left Shoulder   Normal active range of motion    Right Shoulder   Normal active range of motion    Scapular Mobility   Left Shoulder   Scapular Dyskinesis: grade I  Scapular mobility: good  Scapular Mobility with Shoulder to 90° FF   Scapular winging: minimal  Scapular elevation: moderate    Right Shoulder   Scapular Dyskinesis: grade I  Scapular mobility: good  Scapular Mobility with Shoulder to 90° FF   Scapular winging: minimal  Scapular elevation: minimal    Joint Play   Joints within functional limits: C7, T1, T2, T7, T8, T9, T10 and T11     Hypomobile: T4, T5 and T6     Pain: T3, T4, T5, T6 and T12     Strength/Myotome Testing     Left Shoulder     Planes of Motion   Flexion: 5   Abduction: 4+   External rotation at 0°: 4   Internal rotation at 0°: 4+     Isolated Muscles   Rhomboids: 4   Serratus anterior: 4-     Right Shoulder     Planes of Motion   Flexion: 5   Abduction: 5   External rotation at 0°: 4+   Internal rotation at 0°: 5     Isolated Muscles   Rhomboids: 4+   Serratus anterior: 4     Tests   Cervical   Negative vertical compression and cervical distraction.     Thoracic   Negative slump test.     Lumbar   Negative vertical compression.              Precautions: Hx of stage III-IV lung cancer.    Date 10/28            Visit # 1            FOTO IE             Re-eval IE              Manuals 10/28            Scap mobs nv            CTJ grade V As tolerated            J scoop As tolerated            MFD cupping             Neuro Re-Ed 10/28            No monies 10x2\" GTB            Scap punches 10x2\" ea GTB stand            Cat cow 5x3\" ea sit            PNF D2 5x GTB L            Push up plus             Lower trap setting At wall            Scap clocks             Scap wall walks             Ball circles             Rhythmic stabs                " "          Ther Ex 10/28            UBE             Offering raises             Resisted ER             Rows/exts 10x3\" GTB row            Open book str 5x10\" L            Seated T/S ext                                       Ther Activity 10/28                                      Gait Training 10/28                                      Modalities 10/28                                                           "

## 2024-11-18 RX ORDER — AMLODIPINE BESYLATE AND ATORVASTATIN CALCIUM 10; 10 MG/1; MG/1
TABLET, FILM COATED ORAL
COMMUNITY

## 2024-11-20 ENCOUNTER — OFFICE VISIT (OUTPATIENT)
Dept: PALLIATIVE MEDICINE | Facility: CLINIC | Age: 74
End: 2024-11-20
Payer: COMMERCIAL

## 2024-11-20 VITALS
WEIGHT: 144 LBS | BODY MASS INDEX: 23.24 KG/M2 | SYSTOLIC BLOOD PRESSURE: 110 MMHG | HEART RATE: 82 BPM | OXYGEN SATURATION: 99 % | TEMPERATURE: 98 F | DIASTOLIC BLOOD PRESSURE: 60 MMHG

## 2024-11-20 DIAGNOSIS — D02.21 CARCINOMA IN SITU OF RIGHT BRONCHUS OR LUNG: ICD-10-CM

## 2024-11-20 DIAGNOSIS — Z71.89 ADVANCED CARE PLANNING/COUNSELING DISCUSSION: Primary | ICD-10-CM

## 2024-11-20 PROCEDURE — 99497 ADVNCD CARE PLAN 30 MIN: CPT | Performed by: INTERNAL MEDICINE

## 2024-11-20 PROCEDURE — 99205 OFFICE O/P NEW HI 60 MIN: CPT | Performed by: INTERNAL MEDICINE

## 2024-11-20 NOTE — ASSESSMENT & PLAN NOTE
He is still voicing his wish of foregoing any cancer directed therapies.   He does not think that undergoing cancer cares will afford him a better quality of life given his co-morbidities, most of which are significant/serious. He feels that they will only worsen it. His quality of life is more important to him than quantity.   Discussed natural trajectory of progressive active and untreated cancer along with its complications as well as symptoms. He will get weaker over time, lose more weight with or without a good appetite. He may start having pain and shortness of breath. Eventually, his ability to care for himself will decline and he will have to rely on others for help.  Discussed that further re-hospitalizations for complications related to his worsening cancer will likely be ineffectual in the absence of targeted cancer cares.   Discussed hospice as the next best option in the absence of cancer cares. Explained thoroughly that hospice is a care team that is available 24/7 (though not physically) for him and his family with the aim of providing him comfort and prevent suffering towards the end of life by helping him manage symptoms of worsening cancer and its complications. I emphasized that hospice is not for when he is imminent only as he can benefit longer when involved sooner when appropriate (he was implying earlier that he may wait for this until he is bedridden). They are aware that it may take 3-5 days to set up hospice.   At this time, he denies any significant cancer-related symptoms, if any. He denies cancer pain, poor appetite, poor oral intake. He does get SOB but not out of the ordinary. He lost about 10lbs in a year.  He wants to wait until after the holidays re: hospice, which is not unreasonable.  His daughter asked if they can be seen sooner and/or ask for hospice sooner if things change. I said absolutely.   He does not have a living will nor mPOA paperwork. [place] Act 169 explained in terms of  hierarchy in surrogate medical decision making. His wife will be his NOK for medical decisions then his 6 children in the event that he loses capacity to make his own medical decisions. He is implying that he wants his daughter/Jacki to be his mPOA.   SL ACP and HCR paperwork explained to them in detail. He wants to review this at home and will bring back on his visit for completion  I spent 60mins discussing ACP

## 2024-11-20 NOTE — PROGRESS NOTES
Name: Johnson Ulloa      : 1950      MRN: [id number]  Encounter Provider: Rekha Gonzales MD  Encounter Date: 2024   Encounter department[facility]  :  Assessment & Plan  Carcinoma in situ of right bronchus or lung  He was initially diagnosed in 2023, underwent work up and saw oncology who offered treatments but he declined this  There is evidence of disease progression on most recent CT scan on 2024 that showed significant interval progression of right upper lobe malignancy and interval progression of mediastinal adenopathy.    Orders:    Ambulatory Referral to Palliative Care    Advanced care planning/counseling discussion  He is still voicing his wish of foregoing any cancer directed therapies.   He does not think that undergoing cancer cares will afford him a better quality of life given his co-morbidities, most of which are significant/serious. He feels that they will only worsen it. His quality of life is more important to him than quantity.   Discussed natural trajectory of progressive active and untreated cancer along with its complications as well as symptoms. He will get weaker over time, lose more weight with or without a good appetite. He may start having pain and shortness of breath. Eventually, his ability to care for himself will decline and he will have to rely on others for help.  Discussed that further re-hospitalizations for complications related to his worsening cancer will likely be ineffectual in the absence of targeted cancer cares.   Discussed hospice as the next best option in the absence of cancer cares. Explained thoroughly that hospice is a care team that is available 24/ (though not physically) for him and his family with the aim of providing him comfort and prevent suffering towards the end of life by helping him manage symptoms of worsening cancer and its complications. I emphasized that hospice is not for when he is imminent only as  he can benefit longer when involved sooner when appropriate (he was implying earlier that he may wait for this until he is bedridden). They are aware that it may take 3-5 days to set up hospice.   At this time, he denies any significant cancer-related symptoms, if any. He denies cancer pain, poor appetite, poor oral intake. He does get SOB but not out of the ordinary. He lost about 10lbs in a year.  He wants to wait until after the holidays re: hospice, which is not unreasonable.  His daughter asked if they can be seen sooner and/or ask for hospice sooner if things change. I said absolutely.   He does not have a living will nor mPOA paperwork. [place] Act 169 explained in terms of hierarchy in surrogate medical decision making. His wife will be his NOK for medical decisions then his 6 children in the event that he loses capacity to make his own medical decisions. He is implying that he wants his daughter/Jacki to be his mPOA.   SL ACP and HCR paperwork explained to them in detail. He wants to review this at home and will bring back on his visit for completion  I spent 60mins discussing ACP       Follow up  RTO in February 2024 with [facility] SW to reassess goals and complete ACP documents    Decisional apparatus: Patient is competent on my exam today. If competence is lost, patient's substitute decision maker would default to wife by [place] Act 169.     PDMP Review: I have reviewed the patient's controlled substance dispensing history in the Prescription Drug Monitoring Program in compliance with the [place] regulations before prescribing any controlled substances.    08/21/2024 08/20/2024 1 Oxycodone-Acetaminophen 5-325 90.00 30 Eb Michi [id number] Vet (7476) 0 22.50 [facility] [place]       History of Present Illness   Johnson Ulloa is a 74 y.o. male with advanced COPD, PAD, CKD, nonrheumatic aortic valve stenosis, and squamous cell lung cancer. He was diagnosed with biopsy-proven SCC in 5/2023. 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 He saw oncology and recommended concurrent radiation/chemo followed by durvalumab but he declined treatments. His latest CT chest on 9/2024 showed significant interval progression of right upper lobe malignancy and interval progression of mediastinal adenopathy. [facility] for goals of care.    Met him and his daughter/Jacki. Introduced palliative medicine. Spent time discussing goals of care and hospice as noted above. We also discussed ACP and the  HCR and AD paperwork.     He lives with his wife and one of his sons who is quadriplegic. Him and his wife are his primary caregivers. They have 6 children. Daughter/Jacki lives 10mins away and is often available to provide assistance.     Details of conversation as noted above.     Pertinent Medical History   COPD, PAD, aortic valve stenosis      Current Outpatient Medications on File Prior to Visit   Medication Sig Dispense Refill    albuterol (PROVENTIL HFA,VENTOLIN HFA) 90 mcg/act inhaler INHALE 2 PUFFS BY MOUTH EVERY FOUR HOURS HRLY AS NEEDED FOR BREATHING      amLODIPine-atorvastatin (CADUET) 10-10 MG per tablet       cholecalciferol (VITAMIN D3) 1,000 units tablet Take 1,000 Units by mouth daily at bedtime      ferrous sulfate 325 (65 Fe) mg tablet Take 325 mg by mouth daily at bedtime      losartan (COZAAR) 100 MG tablet TAKE ONE-HALF TABLET BY MOUTH EVERY DAY FOR BLOOD PRESSURE/HEART      pravastatin (PRAVACHOL) 80 mg tablet TAKE 40MG (ONE-HALF TABLET) BY MOUTH EVERY DAY AT 5 PM FOR CHOLESTEROL       No current facility-administered medications on file prior to visit.      Social History     Tobacco Use    Smoking status: Every Day     Current packs/day: 1.00     Average packs/day: 1 pack/day for 60.0 years (60.0 ttl pk-yrs)     Types: Cigarettes    Smokeless  tobacco: Never    Tobacco comments:     One cigarette at 0400 5/8   Vaping Use    Vaping status: Never Used   Substance and Sexual Activity    Alcohol use: Yes     Alcohol/week: 6.0 standard drinks of alcohol     Types: 6 Cans of beer per week    Drug use: Never    Sexual activity: Not Currently        Objective   /60 (BP Location: Right arm, Patient Position: Sitting, Cuff Size: Standard)   Pulse 82   Temp 98 °F (36.7 °C) (Temporal)   Wt 65.3 kg (144 lb)   SpO2 99%   BMI 23.24 kg/m²     Physical Exam  Constitutional:       General: He is not in acute distress.     Appearance: He is ill-appearing. He is not toxic-appearing or diaphoretic.      Comments: Appears as stated age, chronically ill looking, comfortable, stable, pleasant   HENT:      Head: Normocephalic and atraumatic.   Eyes:      General: No scleral icterus.        Right eye: No discharge.         Left eye: No discharge.   Pulmonary:      Effort: Pulmonary effort is normal. No respiratory distress.      Comments: On RA  Abdominal:      General: Abdomen is flat. There is no distension.   Musculoskeletal:         General: No swelling.   Skin:     General: Skin is warm and dry.      Coloration: Skin is not jaundiced or pale.   Neurological:      General: No focal deficit present.      Mental Status: He is alert and oriented to person, place, and time. Mental status is at baseline.   Psychiatric:         Mood and Affect: Mood normal.         Behavior: Behavior normal.         Thought Content: Thought content normal.         Judgment: Judgment normal.         Recent labs:  Lab Results   Component Value Date/Time    SODIUM 136 05/11/2023 05:46 AM    SODIUM 137 03/18/2021 08:47 AM    K 4.1 05/11/2023 05:46 AM    K 3.8 03/18/2021 08:47 AM    BUN 26 (H) 05/11/2023 05:46 AM    BUN 22 03/18/2021 08:47 AM    CREATININE 1.37 (H) 05/11/2023 05:46 AM    CREATININE 1.19 03/18/2021 08:47 AM    GLUC 100 05/11/2023 05:46 AM    GLUC 104 (H) 03/18/2021 08:47 AM     CALCIUM 9.0 05/11/2023 05:46 AM    CALCIUM 9.6 03/18/2021 08:47 AM    AST 12 (L) 05/11/2023 05:46 AM    ALT 7 05/11/2023 05:46 AM    ALB 3.7 05/11/2023 05:46 AM    TP 7.1 05/11/2023 05:46 AM    EGFR 50 05/11/2023 05:46 AM     Lab Results   Component Value Date/Time    HGB 11.6 (L) 05/13/2023 10:04 AM    WBC 10.63 (H) 05/13/2023 10:04 AM     05/13/2023 10:04 AM    INR 1.05 02/26/2023 11:36 AM    INR 0.9 03/18/2021 08:47 AM    PTT 32 02/26/2023 11:36 AM     Lab Results   Component Value Date/Time    [id number] 2.349 08/29/2019 08:48 AM       Recent Imaging:  Procedure: CT chest wo contrast  Result Date: 9/6/2024  Narrative: CT CHEST WITHOUT IV CONTRAST INDICATION: C34.90: Malignant neoplasm of unspecified part of unspecified bronchus or lung. COMPARISON: May 10, 2023, August 16, 2019 TECHNIQUE: CT examination of the chest was performed without intravenous contrast. Multiplanar 2D reformatted images were created from the source data. This examination, like all CT scans performed in the [facility], was performed utilizing techniques to minimize radiation dose exposure, including the use of iterative reconstruction and automated exposure control. Radiation dose length product (DLP) for this visit: 193 mGy-cm FINDINGS: LUNGS: Background mild to moderate emphysema. Significant interval progression of the right upper lobe malignancy, currently measuring 2.5 x 3.1 cm, posterior segment right upper lobe peripherally located, abutting the lateral pleura with a contiguous pleural-based length of 2.5 cm. 2 adjacent oval circumscribed nodules in the right lower lobe without suspicious change, 8 x 6 mm image 5/70 and 4 mm, image 5/67. 3 mm nodule left upper lobe image 49, new. Stable subsegmental scarring medial right middle lobe. PLEURA: Unremarkable. HEART/GREAT VESSELS: Heavy atherosclerotic coronary artery calcification. No thoracic aortic aneurysm. MEDIASTINUM AND JUAN: Increasing size of several  mediastinal lymph nodes. Specific examples azygos node image 49 measuring 12 mm, increased from 8 mm. Precarinal node image 5211 mm, increased from 6 mm. Increasing shotty subcentimeter lymph nodes in the right suprahilar region. CHEST WALL AND LOWER NECK: Unremarkable. VISUALIZED STRUCTURES IN THE UPPER ABDOMEN: Unremarkable. OSSEOUS STRUCTURES: No acute fracture or destructive osseous lesion.     Impression: Significant interval progression of right upper lobe malignancy. See above for further details. Interval progression of mediastinal adenopathy. The study was marked in EPIC for significant notification. Workstation performed: [id number]       Administrative Statements   I have spent a total time of 60+ minutes in caring for this patient on the day of the visit/encounter including Diagnostic results, Prognosis, Risks and benefits of tx options, Instructions for management, Patient and family education, Importance of tx compliance, Risk factor reductions, Impressions, Counseling / Coordination of care, Documenting in the medical record, Reviewing / ordering tests, medicine, procedures  , and Obtaining or reviewing history  . Topics discussed with the patient / family include symptom assessment and management, psychosocial support, advanced directives, goals of care, hospice services, supportive listening, and anticipatory guidance.    Rekha Gonzales MD  Palliative Medicine & Supportive Care  Internal Medicine  Available via [employer] Text  Office: [phone]  Fax: [phone]

## 2024-12-28 ENCOUNTER — HOSPITAL ENCOUNTER (INPATIENT)
Facility: HOSPITAL | Age: 74
LOS: 1 days | Discharge: HOME | DRG: 194 | End: 2024-12-30
Attending: EMERGENCY MEDICINE | Admitting: INTERNAL MEDICINE
Payer: COMMERCIAL

## 2024-12-28 ENCOUNTER — APPOINTMENT (EMERGENCY)
Dept: RADIOLOGY | Facility: HOSPITAL | Age: 74
DRG: 194 | End: 2024-12-28
Payer: COMMERCIAL

## 2024-12-28 DIAGNOSIS — R19.7 DIARRHEA: Primary | ICD-10-CM

## 2024-12-28 DIAGNOSIS — C34.90 LUNG CANCER (HCC): ICD-10-CM

## 2024-12-28 DIAGNOSIS — R53.1 GENERALIZED WEAKNESS: ICD-10-CM

## 2024-12-28 DIAGNOSIS — N17.9 AKI (ACUTE KIDNEY INJURY) (HCC): ICD-10-CM

## 2024-12-28 DIAGNOSIS — R79.89 ELEVATED TROPONIN: ICD-10-CM

## 2024-12-28 LAB
2HR DELTA HS TROPONIN: -16 NG/L
4HR DELTA HS TROPONIN: -2 NG/L
ALBUMIN SERPL BCG-MCNC: 3.6 G/DL (ref 3.5–5)
ALP SERPL-CCNC: 71 U/L (ref 34–104)
ALT SERPL W P-5'-P-CCNC: 9 U/L (ref 7–52)
ANION GAP SERPL CALCULATED.3IONS-SCNC: 7 MMOL/L (ref 4–13)
AST SERPL W P-5'-P-CCNC: 20 U/L (ref 13–39)
ATRIAL RATE: 326 BPM
ATRIAL RATE: 79 BPM
BACTERIA UR QL AUTO: ABNORMAL /HPF
BASOPHILS # BLD AUTO: 0.04 THOUSANDS/ÂΜL (ref 0–0.1)
BASOPHILS NFR BLD AUTO: 1 % (ref 0–1)
BILIRUB SERPL-MCNC: 0.36 MG/DL (ref 0.2–1)
BILIRUB UR QL STRIP: NEGATIVE
BUN SERPL-MCNC: 27 MG/DL (ref 5–25)
CALCIUM SERPL-MCNC: 8.6 MG/DL (ref 8.4–10.2)
CARDIAC TROPONIN I PNL SERPL HS: 42 NG/L (ref ?–50)
CARDIAC TROPONIN I PNL SERPL HS: 56 NG/L (ref ?–50)
CARDIAC TROPONIN I PNL SERPL HS: 58 NG/L (ref ?–50)
CHLORIDE SERPL-SCNC: 110 MMOL/L (ref 96–108)
CLARITY UR: CLEAR
CO2 SERPL-SCNC: 20 MMOL/L (ref 21–32)
COLOR UR: YELLOW
CREAT SERPL-MCNC: 1.75 MG/DL (ref 0.6–1.3)
EOSINOPHIL # BLD AUTO: 0 THOUSAND/ÂΜL (ref 0–0.61)
EOSINOPHIL NFR BLD AUTO: 0 % (ref 0–6)
ERYTHROCYTE [DISTWIDTH] IN BLOOD BY AUTOMATED COUNT: 13.6 % (ref 11.6–15.1)
FLUAV AG UPPER RESP QL IA.RAPID: POSITIVE
FLUBV AG UPPER RESP QL IA.RAPID: NEGATIVE
GFR SERPL CREATININE-BSD FRML MDRD: 37 ML/MIN/1.73SQ M
GLUCOSE SERPL-MCNC: 102 MG/DL (ref 65–140)
GLUCOSE UR STRIP-MCNC: NEGATIVE MG/DL
HCT VFR BLD AUTO: 36.7 % (ref 36.5–49.3)
HGB BLD-MCNC: 11.8 G/DL (ref 12–17)
HGB UR QL STRIP.AUTO: ABNORMAL
IMM GRANULOCYTES # BLD AUTO: 0.03 THOUSAND/UL (ref 0–0.2)
IMM GRANULOCYTES NFR BLD AUTO: 0 % (ref 0–2)
KETONES UR STRIP-MCNC: NEGATIVE MG/DL
LEUKOCYTE ESTERASE UR QL STRIP: NEGATIVE
LIPASE SERPL-CCNC: 17 U/L (ref 11–82)
LYMPHOCYTES # BLD AUTO: 1.32 THOUSANDS/ÂΜL (ref 0.6–4.47)
LYMPHOCYTES NFR BLD AUTO: 18 % (ref 14–44)
MCH RBC QN AUTO: 29.1 PG (ref 26.8–34.3)
MCHC RBC AUTO-ENTMCNC: 32.2 G/DL (ref 31.4–37.4)
MCV RBC AUTO: 91 FL (ref 82–98)
MONOCYTES # BLD AUTO: 0.78 THOUSAND/ÂΜL (ref 0.17–1.22)
MONOCYTES NFR BLD AUTO: 10 % (ref 4–12)
NEUTROPHILS # BLD AUTO: 5.31 THOUSANDS/ÂΜL (ref 1.85–7.62)
NEUTS SEG NFR BLD AUTO: 71 % (ref 43–75)
NITRITE UR QL STRIP: NEGATIVE
NON-SQ EPI CELLS URNS QL MICRO: ABNORMAL /HPF
NRBC BLD AUTO-RTO: 0 /100 WBCS
P AXIS: 4 DEGREES
PH UR STRIP.AUTO: 6 [PH] (ref 4.5–8)
PLATELET # BLD AUTO: 231 THOUSANDS/UL (ref 149–390)
PMV BLD AUTO: 9.5 FL (ref 8.9–12.7)
POTASSIUM SERPL-SCNC: 3.5 MMOL/L (ref 3.5–5.3)
PR INTERVAL: 138 MS
PROT SERPL-MCNC: 7.4 G/DL (ref 6.4–8.4)
PROT UR STRIP-MCNC: ABNORMAL MG/DL
QRS AXIS: 54 DEGREES
QRS AXIS: 59 DEGREES
QRSD INTERVAL: 78 MS
QRSD INTERVAL: 80 MS
QT INTERVAL: 340 MS
QT INTERVAL: 352 MS
QTC INTERVAL: 401 MS
QTC INTERVAL: 401 MS
RBC # BLD AUTO: 4.05 MILLION/UL (ref 3.88–5.62)
RBC #/AREA URNS AUTO: ABNORMAL /HPF
SARS-COV+SARS-COV-2 AG RESP QL IA.RAPID: NEGATIVE
SODIUM SERPL-SCNC: 137 MMOL/L (ref 135–147)
SP GR UR STRIP.AUTO: 1.02 (ref 1–1.03)
T WAVE AXIS: 27 DEGREES
T WAVE AXIS: 40 DEGREES
UROBILINOGEN UR QL STRIP.AUTO: 0.2 E.U./DL
VENTRICULAR RATE: 78 BPM
VENTRICULAR RATE: 84 BPM
WBC # BLD AUTO: 7.48 THOUSAND/UL (ref 4.31–10.16)
WBC #/AREA URNS AUTO: ABNORMAL /HPF

## 2024-12-28 PROCEDURE — 83690 ASSAY OF LIPASE: CPT | Performed by: EMERGENCY MEDICINE

## 2024-12-28 PROCEDURE — 99285 EMERGENCY DEPT VISIT HI MDM: CPT

## 2024-12-28 PROCEDURE — 36415 COLL VENOUS BLD VENIPUNCTURE: CPT

## 2024-12-28 PROCEDURE — 99223 1ST HOSP IP/OBS HIGH 75: CPT

## 2024-12-28 PROCEDURE — 71045 X-RAY EXAM CHEST 1 VIEW: CPT

## 2024-12-28 PROCEDURE — 81001 URINALYSIS AUTO W/SCOPE: CPT

## 2024-12-28 PROCEDURE — 87811 SARS-COV-2 COVID19 W/OPTIC: CPT | Performed by: EMERGENCY MEDICINE

## 2024-12-28 PROCEDURE — 96360 HYDRATION IV INFUSION INIT: CPT

## 2024-12-28 PROCEDURE — 93010 ELECTROCARDIOGRAM REPORT: CPT | Performed by: STUDENT IN AN ORGANIZED HEALTH CARE EDUCATION/TRAINING PROGRAM

## 2024-12-28 PROCEDURE — 80053 COMPREHEN METABOLIC PANEL: CPT | Performed by: EMERGENCY MEDICINE

## 2024-12-28 PROCEDURE — 84484 ASSAY OF TROPONIN QUANT: CPT | Performed by: EMERGENCY MEDICINE

## 2024-12-28 PROCEDURE — 85025 COMPLETE CBC W/AUTO DIFF WBC: CPT | Performed by: EMERGENCY MEDICINE

## 2024-12-28 PROCEDURE — 87804 INFLUENZA ASSAY W/OPTIC: CPT | Performed by: EMERGENCY MEDICINE

## 2024-12-28 PROCEDURE — 93005 ELECTROCARDIOGRAM TRACING: CPT

## 2024-12-28 PROCEDURE — 87505 NFCT AGENT DETECTION GI: CPT | Performed by: EMERGENCY MEDICINE

## 2024-12-28 RX ORDER — CALCIUM CARBONATE 500 MG/1
1000 TABLET, CHEWABLE ORAL DAILY PRN
Status: DISCONTINUED | OUTPATIENT
Start: 2024-12-28 | End: 2024-12-30 | Stop reason: HOSPADM

## 2024-12-28 RX ORDER — HEPARIN SODIUM 5000 [USP'U]/ML
5000 INJECTION, SOLUTION INTRAVENOUS; SUBCUTANEOUS EVERY 8 HOURS SCHEDULED
Status: DISCONTINUED | OUTPATIENT
Start: 2024-12-28 | End: 2024-12-30 | Stop reason: HOSPADM

## 2024-12-28 RX ORDER — AMLODIPINE BESYLATE 10 MG/1
10 TABLET ORAL DAILY
Status: DISCONTINUED | OUTPATIENT
Start: 2024-12-29 | End: 2024-12-30 | Stop reason: HOSPADM

## 2024-12-28 RX ORDER — PRAVASTATIN SODIUM 40 MG
40 TABLET ORAL
Status: DISCONTINUED | OUTPATIENT
Start: 2024-12-28 | End: 2024-12-30 | Stop reason: HOSPADM

## 2024-12-28 RX ORDER — SIMETHICONE 80 MG
80 TABLET,CHEWABLE ORAL 4 TIMES DAILY PRN
Status: DISCONTINUED | OUTPATIENT
Start: 2024-12-28 | End: 2024-12-30 | Stop reason: HOSPADM

## 2024-12-28 RX ORDER — AMLODIPINE BESYLATE 10 MG/1
10 TABLET ORAL DAILY
COMMUNITY

## 2024-12-28 RX ORDER — ONDANSETRON 2 MG/ML
4 INJECTION INTRAMUSCULAR; INTRAVENOUS EVERY 6 HOURS PRN
Status: DISCONTINUED | OUTPATIENT
Start: 2024-12-28 | End: 2024-12-30 | Stop reason: HOSPADM

## 2024-12-28 RX ORDER — ACETAMINOPHEN 325 MG/1
650 TABLET ORAL EVERY 6 HOURS PRN
Status: DISCONTINUED | OUTPATIENT
Start: 2024-12-28 | End: 2024-12-30 | Stop reason: HOSPADM

## 2024-12-28 RX ORDER — DEXTROSE MONOHYDRATE, SODIUM CHLORIDE, AND POTASSIUM CHLORIDE 50; 1.49; 9 G/1000ML; G/1000ML; G/1000ML
100 INJECTION, SOLUTION INTRAVENOUS CONTINUOUS
Status: DISCONTINUED | OUTPATIENT
Start: 2024-12-28 | End: 2024-12-29

## 2024-12-28 RX ORDER — FERROUS SULFATE 325(65) MG
325 TABLET ORAL
Status: DISCONTINUED | OUTPATIENT
Start: 2024-12-28 | End: 2024-12-30 | Stop reason: HOSPADM

## 2024-12-28 RX ORDER — NICOTINE 21 MG/24HR
1 PATCH, TRANSDERMAL 24 HOURS TRANSDERMAL DAILY
Status: DISCONTINUED | OUTPATIENT
Start: 2024-12-28 | End: 2024-12-30 | Stop reason: HOSPADM

## 2024-12-28 RX ORDER — OSELTAMIVIR PHOSPHATE 30 MG/1
30 CAPSULE ORAL EVERY 12 HOURS SCHEDULED
Status: DISCONTINUED | OUTPATIENT
Start: 2024-12-29 | End: 2024-12-30 | Stop reason: HOSPADM

## 2024-12-28 RX ORDER — ALBUTEROL SULFATE 90 UG/1
2 INHALANT RESPIRATORY (INHALATION) EVERY 4 HOURS PRN
Status: DISCONTINUED | OUTPATIENT
Start: 2024-12-28 | End: 2024-12-30 | Stop reason: HOSPADM

## 2024-12-28 RX ORDER — OSELTAMIVIR PHOSPHATE 75 MG/1
75 CAPSULE ORAL ONCE
Status: COMPLETED | OUTPATIENT
Start: 2024-12-28 | End: 2024-12-28

## 2024-12-28 RX ADMIN — DEXTROSE, SODIUM CHLORIDE, AND POTASSIUM CHLORIDE 100 ML/HR: 5; .9; .15 INJECTION INTRAVENOUS at 17:24

## 2024-12-28 RX ADMIN — SODIUM CHLORIDE, SODIUM LACTATE, POTASSIUM CHLORIDE, AND CALCIUM CHLORIDE 1000 ML: .6; .31; .03; .02 INJECTION, SOLUTION INTRAVENOUS at 15:21

## 2024-12-28 RX ADMIN — OSELTAMIVIR PHOSPHATE 75 MG: 75 CAPSULE ORAL at 21:40

## 2024-12-28 RX ADMIN — HEPARIN SODIUM 5000 UNITS: 5000 INJECTION INTRAVENOUS; SUBCUTANEOUS at 15:44

## 2024-12-28 RX ADMIN — ACETAMINOPHEN 650 MG: 325 TABLET, FILM COATED ORAL at 23:39

## 2024-12-28 RX ADMIN — HEPARIN SODIUM 5000 UNITS: 5000 INJECTION INTRAVENOUS; SUBCUTANEOUS at 21:41

## 2024-12-28 RX ADMIN — Medication 1000 UNITS: at 21:40

## 2024-12-28 RX ADMIN — ACETAMINOPHEN 650 MG: 325 TABLET, FILM COATED ORAL at 16:03

## 2024-12-28 RX ADMIN — FERROUS SULFATE TAB 325 MG (65 MG ELEMENTAL FE) 325 MG: 325 (65 FE) TAB at 21:40

## 2024-12-28 RX ADMIN — PRAVASTATIN SODIUM 40 MG: 40 TABLET ORAL at 16:03

## 2024-12-28 RX ADMIN — SODIUM CHLORIDE 1000 ML: 0.9 INJECTION, SOLUTION INTRAVENOUS at 11:46

## 2024-12-28 NOTE — ED PROVIDER NOTES
Time reflects when diagnosis was documented in both MDM as applicable and the Disposition within this note       Time User Action Codes Description Comment    12/28/2024  1:21 PM Salo Char JANY Add [R19.7] Diarrhea     12/28/2024  1:21 PM Salo Char JANY Add [N17.9] TIFFANY (acute kidney injury) (HCC)     12/28/2024  1:21 PM Salo Char JANY Add [R53.1] Generalized weakness     12/28/2024  1:21 PM Salo Char JANY Add [R79.89] Elevated troponin     12/28/2024  1:21 PM Char Leong JANY Add [C34.90] Lung cancer (HCC)           ED Disposition       ED Disposition   Admit    Condition   Stable    Date/Time   Sat Dec 28, 2024  1:21 PM    Comment   Case was discussed with LARRY and the patient's admission status was agreed to be Admission Status: observation status to the service of Dr. Traore .               Assessment & Plan       Medical Decision Making  75 yo M with N/V/D, cough, fever- IVF hydration, CBC to assess for leukocytosis/anemia, CMP to assess for elevated LFTs/TIFFANY/electrolyte abn, CXR to r/o PNA, COVID/Flu testing    TIFFANY likely from dehydration/diarrhea- IVF  Elevated troponin, likely demand, no CP/EKG changes  Given weakness, TIFFANY despite trying to stay hydrated at home, admitted for IVF/further management/trending of trop    Problems Addressed:  TIFFANY (acute kidney injury) (HCC): acute illness or injury  Diarrhea: acute illness or injury  Elevated troponin: acute illness or injury  Generalized weakness: acute illness or injury  Lung cancer (HCC): chronic illness or injury    Amount and/or Complexity of Data Reviewed  External Data Reviewed: labs, radiology, ECG and notes.  Labs: ordered. Decision-making details documented in ED Course.  Radiology: ordered and independent interpretation performed. Decision-making details documented in ED Course.  ECG/medicine tests: ordered and independent interpretation performed. Decision-making details documented in ED Course.    Risk  Decision regarding hospitalization.        ED Course as of  12/28/24 1639   Sat Dec 28, 2024   1132 WBC: 7.48   1132 SpO2: 98 %  On RA   1132 EKG independently interpreted by myself:  sinus arrythmia, 78 bpm, normal axis, qtc 401, nonspecific st changes   1149 hs TnI 0hr(!): 58   1159 Creatinine(!): 1.75  1.2- 1.3 1 year ago   1242 Ketones, UA: Negative   1243 Blood, UA(!): Moderate   1318 Discussed results with pt and pt's daughter, he is agreeable to admission       Medications   lactated ringers bolus 1,000 mL (has no administration in time range)   sodium chloride 0.9 % bolus 1,000 mL (0 mL Intravenous Stopped 12/28/24 1246)       ED Risk Strat Scores   HEART Risk Score      Flowsheet Row Most Recent Value   Heart Score Risk Calculator    History 0 Filed at: 12/28/2024 1316   ECG 0 Filed at: 12/28/2024 1316   Age 2 Filed at: 12/28/2024 1316   Risk Factors 2 Filed at: 12/28/2024 1316   Troponin 2 Filed at: 12/28/2024 1316   HEART Score 6 Filed at: 12/28/2024 1316          HEART Risk Score      Flowsheet Row Most Recent Value   Heart Score Risk Calculator    History 0 Filed at: 12/28/2024 1316   ECG 0 Filed at: 12/28/2024 1316   Age 2 Filed at: 12/28/2024 1316   Risk Factors 2 Filed at: 12/28/2024 1316   Troponin 2 Filed at: 12/28/2024 1316   HEART Score 6 Filed at: 12/28/2024 1316                            SBIRT 20yo+      Flowsheet Row Most Recent Value   Initial Alcohol Screen: US AUDIT-C     1. How often do you have a drink containing alcohol? 0 Filed at: 12/28/2024 1100   2. How many drinks containing alcohol do you have on a typical day you are drinking?  0 Filed at: 12/28/2024 1100   3b. FEMALE Any Age, or MALE 65+: How often do you have 4 or more drinks on one occassion? 0 Filed at: 12/28/2024 1100   Audit-C Score 0 Filed at: 12/28/2024 1100   ALEKSANDR: How many times in the past year have you...    Used an illegal drug or used a prescription medication for non-medical reasons? Never Filed at: 12/28/2024 1100                            History of Present Illness        Chief Complaint   Patient presents with    Diarrhea     Patient reports diarrhea and diarrhea over past 2-3 days, denies abdominal pain, reports wife just got discharged from the hospital for the same.  Also reports SOB over past 2 days, hx smoking 50-60 yrs has not smoked over the past 3 days. O2 98% on room air.        Past Medical History:   Diagnosis Date    Anemia     Arthritis     Cancer (HCC)     prostate    COPD (chronic obstructive pulmonary disease) (HCC)     History of transfusion 2005    auto transfusion    Hyperlipidemia     Hypertension     Leaky heart valve     Lung nodules     unsure of dx    PVD (peripheral vascular disease) (HCC)     Tobacco abuse     UTI (urinary tract infection)       Past Surgical History:   Procedure Laterality Date    COLONOSCOPY      HERNIA REPAIR      with mesh    IR BIOPSY LUNG  5/8/2023    MT CYSTO CALIBRATION DILAT URTL STRIX/STENOSIS N/A 9/13/2022    Procedure: DILATATION URETHRAL, incision bladder neck contracture,cysto;  Surgeon: Jaya Roper MD;  Location: [facility];  Service: Urology    MT TRANSURETHRAL RESECTION BLADDER NECK N/A 3/8/2022    Procedure: RESECTION TRANSURETHRAL BLADDER NECK CONTRACTURE;  Surgeon: Jaya Roper MD;  Location: [facility];  Service: Urology    PROSTATECTOMY      TONSILLECTOMY        History reviewed. No pertinent family history.   Social History     Tobacco Use    Smoking status: Every Day     Current packs/day: 1.00     Average packs/day: 1 pack/day for 60.0 years (60.0 ttl pk-yrs)     Types: Cigarettes    Smokeless tobacco: Never    Tobacco comments:     One cigarette at 0400 5/8   Vaping Use    Vaping status: Never Used   Substance Use Topics    Alcohol use: Yes     Alcohol/week: 6.0 standard drinks of alcohol     Types: 6 Cans of beer per week    Drug use: Never      E-Cigarette/Vaping    E-Cigarette Use Never User       E-Cigarette/Vaping Substances    Nicotine No     THC No     CBD No     Flavoring No     Other No      Unknown No       I have reviewed and agree with the history as documented.     75 yo M h/o COPD, lung ca, anemia, HTN, HLD; presenting for evaluation of cough and diarrhea.    2-3 days of diarrhea, 4-5x/day  N/V the 1st day which stopped, but has no appetite, trying to stay hydrated, but hasn't eating  No abdominal pain    Fever 101 yesterday s/p Tylenol. 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     Review of Systems        Objective       ED Triage Vitals   Temperature Pulse Blood Pressure Respirations SpO2 Patient Position - Orthostatic VS   12/28/24 1058 12/28/24 1058 12/28/24 1058 12/28/24 1058 12/28/24 1058 12/28/24 1058   98.8 °F (37.1 °C) 82 141/62 16 98 % Sitting      Temp Source Heart Rate Source BP Location FiO2 (%) Pain Score    12/28/24 1058 12/28/24 1058 12/28/24 1058 -- 12/28/24 1239    Oral Monitor Right arm  No Pain      Vitals      Date and Time Temp Pulse SpO2 Resp BP Pain Score FACES Pain Rating User   12/28/24 1603 -- -- -- -- -- Med Not Given for Pain - for MAR use only -- BS   12/28/24 1551 -- -- 92 % -- -- No Pain -- BS   12/28/24 1520 99 °F (37.2 °C) 90 90 % 18 139/63 -- -- KH   12/28/24 1245 -- 90 94 % 20 135/60 -- -- SG   12/28/24 1239 -- 96 99 % 18 135/60 No Pain -- KE   12/28/24 1058 98.8 °F (37.1 °C) 82 98 % 16 141/62 -- -- SG            Physical Exam  Vitals and nursing note reviewed.   Constitutional:       General: He is not in acute distress.     Appearance: Normal  appearance. He is well-developed.   HENT:      Head: Normocephalic and atraumatic.      Nose: Nose normal.   Eyes:      Extraocular Movements: Extraocular movements intact.      Conjunctiva/sclera: Conjunctivae normal.   Cardiovascular:      Rate and Rhythm: Normal rate and regular rhythm.      Heart sounds: Normal heart sounds.   Pulmonary:      Effort: Pulmonary effort is normal. No respiratory distress.      Breath sounds: Normal breath sounds. No stridor. No wheezing or rales.   Chest:      Chest wall: No tenderness.   Abdominal:      General: There is no distension.      Palpations: Abdomen is soft.      Tenderness: There is no abdominal tenderness. There is no guarding or rebound.   Musculoskeletal:         General: No swelling, tenderness or deformity.      Cervical back: Normal range of motion and neck supple.   Skin:     General: Skin is warm and dry.      Findings: No rash.   Neurological:      General: No focal deficit present.      Mental Status: He is alert and oriented to person, place, and time.      Motor: No abnormal muscle tone.      Coordination: Coordination normal.   Psychiatric:         Thought Content: Thought content normal.         Judgment: Judgment normal.         Results Reviewed       Procedure Component Value Units Date/Time    HS Troponin I 4hr [[id number]]  (Abnormal) Collected: 12/28/24 1533    Lab Status: Final result Specimen: Blood from Hand, Left Updated: 12/28/24 1602     hs TnI 4hr 56 ng/L      Delta 4hr hsTnI -2 ng/L     FLU/COVID Rapid Antigen (30 min. TAT) - Preferred screening test in ED [[id number]]  (Abnormal) Collected: 12/28/24 1147    Lab Status: Final result Specimen: Nares from Nose Updated: 12/28/24 1524     SARS COV Rapid Antigen Negative     Influenza A Rapid Antigen Positive     Influenza B Rapid Antigen Negative    Narrative:      This test has been performed using the [employer] Caitlin 2 FLU+SARS Antigen test under the Emergency Use Authorization (EUA). This test has  been validated by the  and verified by the performing laboratory. The Caitlin uses lateral flow immunofluorescent sandwich assay to detect SARS-COV, Influenza A and Influenza B Antigen.     The Quidel Caitlin 2 SARS Antigen test does not differentiate between SARS-CoV and SARS-CoV-2.     Negative results are presumptive and may be confirmed with a molecular assay, if necessary, for patient management. Negative results do not rule out SARS-CoV-2 or influenza infection and should not be used as the sole basis for treatment or patient management decisions. A negative test result may occur if the level of antigen in a sample is below the limit of detection of this test.     Positive results are indicative of the presence of viral antigens, but do not rule out bacterial infection or co-infection with other viruses.     All test results should be used as an adjunct to clinical observations and other information available to the provider.    FOR PEDIATRIC PATIENTS - copy/paste COVID Guidelines URL to browser: [email or web address]    HS Troponin I 2hr [[id number]]  (Normal) Collected: 12/28/24 1312    Lab Status: Final result Specimen: Blood from Arm, Right Updated: 12/28/24 1354     hs TnI 2hr 42 ng/L      Delta 2hr hsTnI -16 ng/L     Urine Microscopic [[id number]]  (Abnormal) Collected: 12/28/24 1241    Lab Status: Final result Specimen: Urine Updated: 12/28/24 1341     RBC, UA None Seen /hpf      WBC, UA 4-10 /hpf      Epithelial Cells Occasional /hpf      Bacteria, UA Moderate /hpf     Stool Enteric Bacterial Panel by PCR [[id number]] Collected: 12/28/24 1239    Lab Status: In process Specimen: Stool from Per Rectum Updated: 12/28/24 1244    Clostridium difficile toxin by PCR with EIA [[id number]] Collected: 12/28/24 1239    Lab Status: In process Specimen: Stool from Per Rectum Updated: 12/28/24 1244    Urine Macroscopic, POC [[id number]]  (Abnormal) Collected:  12/28/24 1241    Lab Status: Final result Specimen: Urine Updated: 12/28/24 1242     Color, UA Yellow     Clarity, UA Clear     pH, UA 6.0     Leukocytes, UA Negative     Nitrite, UA Negative     Protein,  (2+) mg/dl      Glucose, UA Negative mg/dl      Ketones, UA Negative mg/dl      Urobilinogen, UA 0.2 E.U./dl      Bilirubin, UA Negative     Occult Blood, UA Moderate     Specific Gravity, UA 1.025    Narrative:      CLINITEK RESULT    Comprehensive metabolic panel [[id number]]  (Abnormal) Collected: 12/28/24 1116    Lab Status: Final result Specimen: Blood from Arm, Right Updated: 12/28/24 1158     Sodium 137 mmol/L      Potassium 3.5 mmol/L      Chloride 110 mmol/L      CO2 20 mmol/L      ANION GAP 7 mmol/L      BUN 27 mg/dL      Creatinine 1.75 mg/dL      Glucose 102 mg/dL      Calcium 8.6 mg/dL      AST 20 U/L      ALT 9 U/L      Alkaline Phosphatase 71 U/L      Total Protein 7.4 g/dL      Albumin 3.6 g/dL      Total Bilirubin 0.36 mg/dL      eGFR 37 ml/min/1.73sq m     Narrative:      National Kid[employer] guidelines for Chronic Kidney Disease (CKD):     Stage 1 with normal or high GFR (GFR > 90 mL/min/1.73 square meters)    Stage 2 Mild CKD (GFR = 60-89 mL/min/1.73 square meters)    Stage 3A Moderate CKD (GFR = 45-59 mL/min/1.73 square meters)    Stage 3B Moderate CKD (GFR = 30-44 mL/min/1.73 square meters)    Stage 4 Severe CKD (GFR = 15-29 mL/min/1.73 square meters)    Stage 5 End Stage CKD (GFR <15 mL/min/1.73 square meters)  Note: GFR calculation is accurate only with a steady state creatinine    Lipase [[id number]]  (Normal) Collected: 12/28/24 1116    Lab Status: Final result Specimen: Blood from Arm, Right Updated: 12/28/24 1158     Lipase 17 u/L     HS Troponin 0hr (reflex protocol) [[id number]]  (Abnormal) Collected: 12/28/24 1116    Lab Status: Final result Specimen: Blood from Arm, Right Updated: 12/28/24 1148     hs TnI 0hr 58 ng/L     CBC and differential [[id number]]  (Abnormal)  Collected: 12/28/24 1116    Lab Status: Final result Specimen: Blood from Arm, Right Updated: 12/28/24 1123     WBC 7.48 Thousand/uL      RBC 4.05 Million/uL      Hemoglobin 11.8 g/dL      Hematocrit 36.7 %      MCV 91 fL      MCH 29.1 pg      MCHC 32.2 g/dL      RDW 13.6 %      MPV 9.5 fL      Platelets 231 Thousands/uL      nRBC 0 /100 WBCs      Segmented % 71 %      Immature Grans % 0 %      Lymphocytes % 18 %      Monocytes % 10 %      Eosinophils Relative 0 %      Basophils Relative 1 %      Absolute Neutrophils 5.31 Thousands/µL      Absolute Immature Grans 0.03 Thousand/uL      Absolute Lymphocytes 1.32 Thousands/µL      Absolute Monocytes 0.78 Thousand/µL      Eosinophils Absolute 0.00 Thousand/µL      Basophils Absolute 0.04 Thousands/µL             XR chest 1 view portable   ED Interpretation by Char Leong DO (12/28 4211)   IMPRESSION:     No acute cardiopulmonary disease.     Redemonstration of peripheral right upper lobe tumor.        Final Interpretation by Pattie Rose MD (12/28 [id number])      No acute cardiopulmonary disease.      Redemonstration of peripheral right upper lobe tumor.      Workstation performed: [id number]             Procedures    ED Medication and Procedure Management   Prior to Admission Medications   Prescriptions Last Dose Informant Patient Reported? Taking?   albuterol (PROVENTIL HFA,VENTOLIN HFA) 90 mcg/act inhaler  Self Yes Yes   Sig: INHALE 2 PUFFS BY MOUTH EVERY FOUR HOURS HRLY AS NEEDED FOR BREATHING   amLODIPine (NORVASC) 10 mg tablet   Yes Yes   Sig: Take 10 mg by mouth daily   cholecalciferol (VITAMIN D3) 1,000 units tablet  Self Yes Yes   Sig: Take 1,000 Units by mouth daily at bedtime   ferrous sulfate 325 (65 Fe) mg tablet  Self Yes Yes   Sig: Take 325 mg by mouth daily at bedtime   losartan (COZAAR) 100 MG tablet 12/27/2024 Self Yes Yes   Sig: Take 100 mg by mouth daily   pravastatin (PRAVACHOL) 80 mg tablet 12/27/2024 Self Yes Yes   Sig: TAKE 40MG (ONE-HALF  TABLET) BY MOUTH EVERY DAY AT 5 PM FOR CHOLESTEROL      Facility-Administered Medications: None     Current Discharge Medication List        CONTINUE these medications which have NOT CHANGED    Details   albuterol (PROVENTIL HFA,VENTOLIN HFA) 90 mcg/act inhaler INHALE 2 PUFFS BY MOUTH EVERY FOUR HOURS HRLY AS NEEDED FOR BREATHING      amLODIPine (NORVASC) 10 mg tablet Take 10 mg by mouth daily      cholecalciferol (VITAMIN D3) 1,000 units tablet Take 1,000 Units by mouth daily at bedtime      ferrous sulfate 325 (65 Fe) mg tablet Take 325 mg by mouth daily at bedtime      losartan (COZAAR) 100 MG tablet Take 100 mg by mouth daily      pravastatin (PRAVACHOL) 80 mg tablet TAKE 40MG (ONE-HALF TABLET) BY MOUTH EVERY DAY AT 5 PM FOR CHOLESTEROL           No discharge procedures on file.  ED SEPSIS DOCUMENTATION   Time reflects when diagnosis was documented in both MDM as applicable and the Disposition within this note       Time User Action Codes Description Comment    12/28/2024  1:21 PM Char Leong Add [R19.7] Diarrhea     12/28/2024  1:21 PM Char Leong Add [N17.9] TIFFANY (acute kidney injury) (HCC)     12/28/2024  1:21 PM Char Leong Add [R53.1] Generalized weakness     12/28/2024  1:21 PM Char Leong [R79.89] Elevated troponin     12/28/2024  1:21 PM Char Leong [C34.90] Lung cancer ([facility])                  Char Leong DO  12/28/24 8832

## 2024-12-28 NOTE — H&P
H&P - Hospitalist   Name: Johnson Ulloa 74 y.o. male I MRN: [id number]  Unit/Bed#: ED-17 I Date of Admission: 12/28/2024   Date of Service: 12/28/2024 I Hospital Day: 0     Assessment & Plan  Acute kidney failure (HCC)  Patient found to have elevated creatinine 1.75  Likely secondary to dehydration in the setting of profuse diarrhea and poor oral intake  IV fluid hydration  Electrolytes WNL, continue monitoring while inpatient  Follow up stool studies   Recheck labs in a.m.  Malignant neoplasm of right lung (HCC)  Known history of lung CA   Chest XR with known lung tumor  Patient notes he does not want treatment for his CA and has been following with palliative care   Not requiring submental oxygen at time of admission, continue monitoring  Hypertension  BP controlled at time of admission   Hold valsartan in the setting of TIFFANY  Continue Norvasc  Diarrhea  Patient notes intractable diarrhea over the past few days with associated nausea, cough and fevers  Follow-up COVID/flu/RSV  Stool studies pending  IV fluid hydration  Replete electrolytes  Trial clear liquid diet, advance as tolerated  Elevated troponin  Troponin elevated at 58 at time of admission  Patient denies any chest pain  Repeat troponin 42  Likely secondary to dehydration and acute viral illness  EKG without acute ST changes      VTE Pharmacologic Prophylaxis: VTE Score: 4 Moderate Risk (Score 3-4) - Pharmacological DVT Prophylaxis Ordered: heparin.  Code Status: Prior DNR/DNI  Discussion with family: Updated  (daughter) at bedside.    Anticipated Length of Stay: Patient will be admitted on an observation basis with an anticipated length of stay of less than 2 midnights secondary to TIFFANY due to dehydration.    History of Present Illness   Chief Complaint: Diarrhea    Johnson Ulloa is a 74 y.o. male with a PMH of lung cancer not receiving therapy, hypertension, hyperlipidemia who presents with diarrhea.  Patient presented to hospital with 3  days of intractable diarrhea as well as nausea, poor oral intake.  He reports associated cough and fevers.  Does report his wife was recently sick with a similar illness.  Reports very minimal oral intake in the past few days due to anorexia.  He does have history of lung cancer for which she is choosing not to undergo any treatment for it.  Does report feeling slightly more short of breath than normal over the past few days with worsening dry cough. He notes some generalized weakness and difficulty ambulating.    Review of Systems   Constitutional:  Positive for appetite change and fever.   HENT:  Negative for trouble swallowing.    Eyes:  Negative for visual disturbance.   Respiratory:  Negative for cough and shortness of breath.    Cardiovascular:  Negative for chest pain and leg swelling.   Gastrointestinal:  Positive for diarrhea and nausea. Negative for abdominal pain and vomiting.   Genitourinary:  Negative for difficulty urinating.   Musculoskeletal:  Negative for back pain.   Skin:  Negative for rash.   Neurological:  Positive for weakness. Negative for dizziness.   Psychiatric/Behavioral:  Negative for sleep disturbance.        Historical Information   Past Medical History:   Diagnosis Date    Anemia     Arthritis     Cancer (HCC)     prostate    COPD (chronic obstructive pulmonary disease) (HCC)     History of transfusion 2005    auto transfusion    Hyperlipidemia     Hypertension     Leaky heart valve     Lung nodules     unsure of dx    PVD (peripheral vascular disease) (HCC)     Tobacco abuse     UTI (urinary tract infection)      Past Surgical History:   Procedure Laterality Date    COLONOSCOPY      HERNIA REPAIR      with mesh    IR BIOPSY LUNG  5/8/2023    CT CYSTO CALIBRATION DILAT URTL STRIX/STENOSIS N/A 9/13/2022    Procedure: DILATATION URETHRAL, incision bladder neck contracture,cysto;  Surgeon: Jaya Roper MD;  Location: [facility];  Service: Urology    CT TRANSURETHRAL RESECTION BLADDER  NECK N/A 3/8/2022    Procedure: RESECTION TRANSURETHRAL BLADDER NECK CONTRACTURE;  Surgeon: Jaya Roper MD;  Location: [facility];  Service: Urology    PROSTATECTOMY      TONSILLECTOMY       Social History     Tobacco Use    Smoking status: Every Day     Current packs/day: 1.00     Average packs/day: 1 pack/day for 60.0 years (60.0 ttl pk-yrs)     Types: Cigarettes    Smokeless tobacco: Never    Tobacco comments:     One cigarette at 0400 5/8   Vaping Use    Vaping status: Never Used   Substance and Sexual Activity    Alcohol use: Yes     Alcohol/week: 6.0 standard drinks of alcohol     Types: 6 Cans of beer per week    Drug use: Never    Sexual activity: Not Currently     E-Cigarette/Vaping    E-Cigarette Use Never User      E-Cigarette/Vaping Substances    Nicotine No     THC No     CBD No     Flavoring No     Other No     Unknown No        Social History:  Marital Status: /Civil Union   Occupation: Unknown  Patient Pre-hospital Living Situation: Home  Patient Pre-hospital Level of Mobility: walks  Patient Pre-hospital Diet Restrictions: None    Meds/Allergies   I have reviewed home medications with patient personally.  Prior to Admission medications    Medication Sig Start Date End Date Taking? Authorizing Provider   albuterol (PROVENTIL HFA,VENTOLIN HFA) 90 mcg/act inhaler INHALE 2 PUFFS BY MOUTH EVERY FOUR HOURS HRLY AS NEEDED FOR BREATHING 12/13/21  Yes Historical Provider, MD   amLODIPine (NORVASC) 10 mg tablet Take 10 mg by mouth daily   Yes Historical Provider, MD   cholecalciferol (VITAMIN D3) 1,000 units tablet Take 1,000 Units by mouth daily at bedtime   Yes Historical Provider, MD   ferrous sulfate 325 (65 Fe) mg tablet Take 325 mg by mouth daily at bedtime   Yes Historical Provider, MD   losartan (COZAAR) 100 MG tablet Take 100 mg by mouth daily 1/27/22  Yes Historical Provider, MD   pravastatin (PRAVACHOL) 80 mg tablet TAKE 40MG (ONE-HALF TABLET) BY MOUTH EVERY DAY AT 5 PM FOR CHOLESTEROL  1/20/23  Yes Historical Provider, MD   amLODIPine-atorvastatin (CADUET) 10-10 MG per tablet   12/28/24 Yes Historical Provider, MD     Allergies   Allergen Reactions    Aspirin Other (See Comments)     Polyps in sinuses     Nitrofurantoin GI Intolerance     Stomach cramping     Sm Non-Asprin Sinus [Pseudoephedrine-Acetaminophen] Other (See Comments)     Patient advised to avoid due to polyps .     Ciprofloxacin Tachycardia    Penicillins Rash    Rosuvastatin Myalgia    Sulfa Antibiotics Rash       Objective :  Temp:  [98.8 °F (37.1 °C)] 98.8 °F (37.1 °C)  HR:  [82-96] 90  BP: (135-141)/(60-62) 135/60  Resp:  [16-20] 20  SpO2:  [94 %-99 %] 94 %  O2 Device: None (Room air)    Physical Exam  Vitals reviewed.   Constitutional:       General: He is not in acute distress.  HENT:      Head: Normocephalic and atraumatic.   Eyes:      General: No scleral icterus.     Conjunctiva/sclera: Conjunctivae normal.   Cardiovascular:      Rate and Rhythm: Normal rate and regular rhythm.      Heart sounds: No murmur heard.  Pulmonary:      Effort: Pulmonary effort is normal. No respiratory distress.      Breath sounds: Normal breath sounds. No wheezing.   Abdominal:      General: Bowel sounds are normal. There is no distension.      Palpations: Abdomen is soft.      Tenderness: There is no abdominal tenderness.   Musculoskeletal:      Cervical back: Neck supple.      Right lower leg: No edema.      Left lower leg: No edema.   Skin:     General: Skin is warm and dry.   Neurological:      Mental Status: He is alert and oriented to person, place, and time.   Psychiatric:         Mood and Affect: Mood normal.         Behavior: Behavior normal.          Lines/Drains:            Lab Results: I have reviewed the following results:  Results from last 7 days   Lab Units 12/28/24  1116   WBC Thousand/uL 7.48   HEMOGLOBIN g/dL 11.8*   HEMATOCRIT % 36.7   PLATELETS Thousands/uL 231   SEGS PCT % 71   LYMPHO PCT % 18   MONO PCT % 10   EOS PCT % 0  "    Results from last 7 days   Lab Units 12/28/24  1116   SODIUM mmol/L 137   POTASSIUM mmol/L 3.5   CHLORIDE mmol/L 110*   CO2 mmol/L 20*   BUN mg/dL 27*   CREATININE mg/dL 1.75*   ANION GAP mmol/L 7   CALCIUM mg/dL 8.6   ALBUMIN g/dL 3.6   TOTAL BILIRUBIN mg/dL 0.36   ALK PHOS U/L 71   ALT U/L 9   AST U/L 20   GLUCOSE RANDOM mg/dL 102             No results found for: \"HGBA1C\"              Administrative Statements       ** Please Note: This note has been constructed using a voice recognition system. **    "

## 2024-12-28 NOTE — PLAN OF CARE
Problem: Potential for Falls  Goal: Patient will remain free of falls  Description: INTERVENTIONS:  - Educate patient/family on patient safety including physical limitations  - Instruct patient to call for assistance with activity   - Consult OT/PT to assist with strengthening/mobility   - Keep Call bell within reach  - Keep bed low and locked with side rails adjusted as appropriate  - Keep care items and personal belongings within reach  - Initiate and maintain comfort rounds  - Make Fall Risk Sign visible to staff  - Offer Toileting every 2 Hours, in advance of need  - Initiate/Maintain bed alarm  - Obtain necessary fall risk management equipment  - Apply yellow socks and bracelet for high fall risk patients  - Consider moving patient to room near nurses station  Outcome: Progressing     Problem: SAFETY ADULT  Goal: Patient will remain free of falls  Description: INTERVENTIONS:  - Educate patient/family on patient safety including physical limitations  - Instruct patient to call for assistance with activity   - Consult OT/PT to assist with strengthening/mobility   - Keep Call bell within reach  - Keep bed low and locked with side rails adjusted as appropriate  - Keep care items and personal belongings within reach  - Initiate and maintain comfort rounds  - Make Fall Risk Sign visible to staff  - Offer Toileting every 2 Hours, in advance of need  - Initiate/Maintain bed alarm  - Obtain necessary fall risk management equipment  - Apply yellow socks and bracelet for high fall risk patients  - Consider moving patient to room near nurses station  Outcome: Progressing  Goal: Maintain or return to baseline ADL function  Description: INTERVENTIONS:  -  Assess patient's ability to carry out ADLs; assess patient's baseline for ADL function and identify physical deficits which impact ability to perform ADLs (bathing, care of mouth/teeth, toileting, grooming, dressing, etc.)  - Assess/evaluate cause of self-care deficits    - Assess range of motion  - Assess patient's mobility; develop plan if impaired  - Assess patient's need for assistive devices and provide as appropriate  - Encourage maximum independence but intervene and supervise when necessary  - Involve family in performance of ADLs  - Assess for home care needs following discharge   - Consider OT consult to assist with ADL evaluation and planning for discharge  - Provide patient education as appropriate  Outcome: Progressing     Problem: DISCHARGE PLANNING  Goal: Discharge to home or other facility with appropriate resources  Description: INTERVENTIONS:  - Identify barriers to discharge w/patient and caregiver  - Arrange for needed discharge resources and transportation as appropriate  - Identify discharge learning needs (meds, wound care, etc.)  - Arrange for interpretive services to assist at discharge as needed  - Refer to Case Management Department for coordinating discharge planning if the patient needs post-hospital services based on physician/advanced practitioner order or complex needs related to functional status, cognitive ability, or social support system  Outcome: Progressing     Problem: Knowledge Deficit  Goal: Patient/family/caregiver demonstrates understanding of disease process, treatment plan, medications, and discharge instructions  Description: Complete learning assessment and assess knowledge base.  Interventions:  - Provide teaching at level of understanding  - Provide teaching via preferred learning methods  Outcome: Progressing     Problem: RESPIRATORY - ADULT  Goal: Achieves optimal ventilation and oxygenation  Description: INTERVENTIONS:  - Assess for changes in respiratory status  - Assess for changes in mentation and behavior  - Position to facilitate oxygenation and minimize respiratory effort  - Oxygen administered by appropriate delivery if ordered  - Initiate smoking cessation education as indicated  - Encourage broncho-pulmonary hygiene  including cough, deep breathe, Incentive Spirometry  - Assess the need for suctioning and aspirate as needed  - Assess and instruct to report SOB or any respiratory difficulty  - Respiratory Therapy support as indicated  Outcome: Progressing     Problem: GASTROINTESTINAL - ADULT  Goal: Minimal or absence of nausea and/or vomiting  Description: INTERVENTIONS:  - Administer IV fluids if ordered to ensure adequate hydration  - Maintain NPO status until nausea and vomiting are resolved  - Nasogastric tube if ordered  - Administer ordered antiemetic medications as needed  - Provide nonpharmacologic comfort measures as appropriate  - Advance diet as tolerated, if ordered  - Consider nutrition services referral to assist patient with adequate nutrition and appropriate food choices  Outcome: Progressing  Goal: Maintains or returns to baseline bowel function  Description: INTERVENTIONS:  - Assess bowel function  - Encourage oral fluids to ensure adequate hydration  - Administer IV fluids if ordered to ensure adequate hydration  - Administer ordered medications as needed  - Encourage mobilization and activity  - Consider nutritional services referral to assist patient with adequate nutrition and appropriate food choices  Outcome: Progressing  Goal: Maintains adequate nutritional intake  Description: INTERVENTIONS:  - Monitor percentage of each meal consumed  - Identify factors contributing to decreased intake, treat as appropriate  - Assist with meals as needed  - Monitor I&O, weight, and lab values if indicated  - Obtain nutrition services referral as needed  Outcome: Progressing     Problem: METABOLIC, FLUID AND ELECTROLYTES - ADULT  Goal: Electrolytes maintained within normal limits  Description: INTERVENTIONS:  - Monitor labs and assess patient for signs and symptoms of electrolyte imbalances  - Administer electrolyte replacement as ordered  - Monitor response to electrolyte replacements, including repeat lab results as  appropriate  - Instruct patient on fluid and nutrition as appropriate  Outcome: Progressing  Goal: Fluid balance maintained  Description: INTERVENTIONS:  - Monitor labs   - Monitor I/O and WT  - Instruct patient on fluid and nutrition as appropriate  - Assess for signs & symptoms of volume excess or deficit  Outcome: Progressing

## 2024-12-29 PROBLEM — J11.1 INFLUENZA: Status: ACTIVE | Noted: 2024-12-29

## 2024-12-29 LAB
ANION GAP SERPL CALCULATED.3IONS-SCNC: 5 MMOL/L (ref 4–13)
ANION GAP SERPL CALCULATED.3IONS-SCNC: 9 MMOL/L (ref 4–13)
BASE EX.OXY STD BLDV CALC-SCNC: 86.5 % (ref 60–80)
BASE EXCESS BLDV CALC-SCNC: -5.5 MMOL/L
BUN SERPL-MCNC: 17 MG/DL (ref 5–25)
BUN SERPL-MCNC: 17 MG/DL (ref 5–25)
C COLI+JEJUNI TUF STL QL NAA+PROBE: NEGATIVE
C DIFF TOX GENS STL QL NAA+PROBE: NEGATIVE
CALCIUM SERPL-MCNC: 8.3 MG/DL (ref 8.4–10.2)
CALCIUM SERPL-MCNC: 8.3 MG/DL (ref 8.4–10.2)
CHLORIDE SERPL-SCNC: 112 MMOL/L (ref 96–108)
CHLORIDE SERPL-SCNC: 115 MMOL/L (ref 96–108)
CO2 SERPL-SCNC: 14 MMOL/L (ref 21–32)
CO2 SERPL-SCNC: 19 MMOL/L (ref 21–32)
CREAT SERPL-MCNC: 1.28 MG/DL (ref 0.6–1.3)
CREAT SERPL-MCNC: 1.35 MG/DL (ref 0.6–1.3)
EC STX1+STX2 GENES STL QL NAA+PROBE: NEGATIVE
ERYTHROCYTE [DISTWIDTH] IN BLOOD BY AUTOMATED COUNT: 13.7 % (ref 11.6–15.1)
GFR SERPL CREATININE-BSD FRML MDRD: 51 ML/MIN/1.73SQ M
GFR SERPL CREATININE-BSD FRML MDRD: 54 ML/MIN/1.73SQ M
GLUCOSE SERPL-MCNC: 104 MG/DL (ref 65–140)
GLUCOSE SERPL-MCNC: 110 MG/DL (ref 65–140)
HCO3 BLDV-SCNC: 17.9 MMOL/L (ref 24–30)
HCT VFR BLD AUTO: 35 % (ref 36.5–49.3)
HGB BLD-MCNC: 11.3 G/DL (ref 12–17)
MCH RBC QN AUTO: 29.4 PG (ref 26.8–34.3)
MCHC RBC AUTO-ENTMCNC: 32.3 G/DL (ref 31.4–37.4)
MCV RBC AUTO: 91 FL (ref 82–98)
O2 CT BLDV-SCNC: 14.8 ML/DL
PCO2 BLDV: 29.1 MM HG (ref 42–50)
PH BLDV: 7.41 [PH] (ref 7.3–7.4)
PLATELET # BLD AUTO: 231 THOUSANDS/UL (ref 149–390)
PMV BLD AUTO: 10.4 FL (ref 8.9–12.7)
PO2 BLDV: 51.4 MM HG (ref 35–45)
POTASSIUM SERPL-SCNC: 3.4 MMOL/L (ref 3.5–5.3)
POTASSIUM SERPL-SCNC: 3.6 MMOL/L (ref 3.5–5.3)
RBC # BLD AUTO: 3.84 MILLION/UL (ref 3.88–5.62)
SALMONELLA SP SPAO STL QL NAA+PROBE: NEGATIVE
SHIGELLA SP+EIEC IPAH STL QL NAA+PROBE: NEGATIVE
SODIUM SERPL-SCNC: 136 MMOL/L (ref 135–147)
SODIUM SERPL-SCNC: 138 MMOL/L (ref 135–147)
WBC # BLD AUTO: 9.53 THOUSAND/UL (ref 4.31–10.16)

## 2024-12-29 PROCEDURE — 99232 SBSQ HOSP IP/OBS MODERATE 35: CPT | Performed by: PHYSICIAN ASSISTANT

## 2024-12-29 PROCEDURE — 82805 BLOOD GASES W/O2 SATURATION: CPT | Performed by: PHYSICIAN ASSISTANT

## 2024-12-29 PROCEDURE — 80048 BASIC METABOLIC PNL TOTAL CA: CPT | Performed by: PHYSICIAN ASSISTANT

## 2024-12-29 PROCEDURE — 85027 COMPLETE CBC AUTOMATED: CPT | Performed by: PHYSICIAN ASSISTANT

## 2024-12-29 PROCEDURE — 97162 PT EVAL MOD COMPLEX 30 MIN: CPT

## 2024-12-29 RX ORDER — POTASSIUM CHLORIDE 1500 MG/1
20 TABLET, EXTENDED RELEASE ORAL
Status: COMPLETED | OUTPATIENT
Start: 2024-12-29 | End: 2024-12-30

## 2024-12-29 RX ADMIN — POTASSIUM CHLORIDE 20 MEQ: 1500 TABLET, EXTENDED RELEASE ORAL at 15:57

## 2024-12-29 RX ADMIN — OSELTAMAVIR PHOSPHATE 30 MG: 30 CAPSULE ORAL at 09:05

## 2024-12-29 RX ADMIN — PRAVASTATIN SODIUM 40 MG: 40 TABLET ORAL at 15:57

## 2024-12-29 RX ADMIN — OSELTAMAVIR PHOSPHATE 30 MG: 30 CAPSULE ORAL at 21:37

## 2024-12-29 RX ADMIN — HEPARIN SODIUM 5000 UNITS: 5000 INJECTION INTRAVENOUS; SUBCUTANEOUS at 21:37

## 2024-12-29 RX ADMIN — HEPARIN SODIUM 5000 UNITS: 5000 INJECTION INTRAVENOUS; SUBCUTANEOUS at 14:44

## 2024-12-29 RX ADMIN — Medication 1000 UNITS: at 21:37

## 2024-12-29 RX ADMIN — POTASSIUM CHLORIDE 20 MEQ: 1500 TABLET, EXTENDED RELEASE ORAL at 12:05

## 2024-12-29 RX ADMIN — SODIUM BICARBONATE 100 ML/HR: 84 INJECTION INTRAVENOUS at 09:49

## 2024-12-29 RX ADMIN — FERROUS SULFATE TAB 325 MG (65 MG ELEMENTAL FE) 325 MG: 325 (65 FE) TAB at 21:37

## 2024-12-29 RX ADMIN — AMLODIPINE BESYLATE 10 MG: 10 TABLET ORAL at 09:49

## 2024-12-29 RX ADMIN — HEPARIN SODIUM 5000 UNITS: 5000 INJECTION INTRAVENOUS; SUBCUTANEOUS at 09:02

## 2024-12-29 NOTE — OCCUPATIONAL THERAPY NOTE
Occupational Therapy Screen         Patient Name: Johnson Ulloa  Today's Date: 12/29/2024 12/29/24 1146   OT Last Visit   OT Visit Date 12/29/24   Note Type   Note type Screen   Additional Comments OT consult received. Chart reviewed. Screen completed. Per RN and PT, pt functioning at his baseline for all functional tasks. No skilled OT warranted at this time. DC OT. Re- consult as appropriate.     Toya Reyes, OT

## 2024-12-29 NOTE — PHYSICAL THERAPY NOTE
PHYSICAL THERAPY EVALUATION          Patient Name: Johnson Ulloa  Today's Date: 12/29/2024 12/29/24 0931   PT Last Visit   PT Visit Date 12/29/24   Note Type   Note type Evaluation   Pain Assessment   Pain Assessment Tool 0-10   Pain Score No Pain   Restrictions/Precautions   Other Precautions Contact/isolation;Droplet precautions;Chair Alarm;Bed Alarm;Fall Risk;O2  (1.5L>RA)   Home Living   Type of Home House   Home Layout Two level;Bed/bath upstairs;Ramped entrance   Bathroom Shower/Tub   (walk in first fl, tub shower second fl)   Bathroom Equipment Grab bars in shower;Shower chair  (in first fl bathroom)   Home Equipment Walker  (from spouse)   Prior Function   Level of [place] Independent with ADLs;Independent with functional mobility   Lives With Spouse   Receives Help From Family   Falls in the last 6 months 0   Comments indep without an AD   General   Additional Pertinent History pt admitted 12/28/24 for acute kidney failure. activity as tolerated orders. PMHx significant for CA, PVD, COPD, arthritis   Cognition   Overall Cognitive Status WFL   Arousal/Participation Cooperative   Orientation Level Oriented to person;Oriented to place;Oriented to time   Memory Within functional limits   Following Commands Follows all commands and directions without difficulty   Comments flat affect   Bed Mobility   Supine to Sit 6  Modified independent   Additional items HOB elevated;Increased time required   Sit to Supine 6  Modified independent   Additional items HOB elevated;Increased time required   Transfers   Sit to Stand 6  Modified independent   Additional items Increased time required;Bedrails   Stand to Sit 6  Modified independent   Additional items Increased time required;Bedrails   Ambulation/Elevation   Gait pattern Improper Weight shift;Wide ALEX;Short stride;Excessively slow   Gait Assistance 5  Supervision   Additional items Assist x 1   Assistive Device None   Distance 100'    Balance   Static Standing Fair   Dynamic Standing Fair -   Ambulatory Fair -   Endurance Deficit   Endurance Deficit No  (O2 92-97% on RA at rest. 93% on RA post amb)   Activity Tolerance   Activity Tolerance Patient tolerated treatment well   Medical Staff Made Aware OT, PA   Nurse Made Aware Annabelle BRAGG   Assessment   Prognosis Good   Assessment Johnson Ulloa is a 74 y.o. male admitted to [facility] on 12/28/[facility]24 for Acute kidney failure (HCC). PT was consulted and pt was seen on 12/29/2024 for mobility assessment and d/c planning. Pt presents w contact and droplet isolation, low fall risk, multiple lines. At baseline is indep without an AD. Pt is currently functioning at a mod I bed mobility and transfers, S for ambulation. Pt demonstrated ability to ambulate unlimited household distances. No subjective complaints during session. Pt reports feeling slightly improved in sx since admission. Vitals stable on RA pre to post ambulation. No further acute PT needs identified however would benefit from daily mobilization via nsg/restorative.   Barriers to Discharge None   Plan   PT Frequency   (d/c PT: maintain on restorative)   Discharge Recommendation   Rehab Resource Intensity Level, PT No post-acute rehabilitation needs   AM-PAC Basic Mobility Inpatient   Turning in Flat Bed Without Bedrails 4   Lying on Back to Sitting on Edge of Flat Bed Without Bedrails 4   Moving Bed to Chair 4   Standing Up From Chair Using Arms 4   Walk in Room 3   Climb 3-5 Stairs With Railing 4   Basic Mobility Inpatient Raw Score 23   Basic Mobility Standardized Score 50.88   [facility] Highest Level Of Mobility   -HLM Goal 7: Walk 25 feet or more   -HLM Achieved 7: Walk 25 feet or more   End of Consult   Patient Position at End of Consult Supine;Bed/Chair alarm activated;All needs within reach     History: co - morbidities including age, current experience including fall risk, multiple lines, contact and droplet isolation  Exam: impairments in  systems including multiple body structures involved; neuromuscular (balance, gait, transfers), cardiopulmonary (vitals), cognition; activity limitations  (difficulties executing an action); participation restrictions (problems associated w involvement in life situations), am-pac  Clinical: stable/unpredictable  Complexity: moderate    Marichuy Cleaning, PT

## 2024-12-29 NOTE — PROGRESS NOTES
Progress Note - Hospitalist   Name: Johnson Ulloa 74 y.o. male I MRN: [id number]  Unit/Bed#: E2 -01 I Date of Admission: 12/28/2024   Date of Service: 12/29/2024 I Hospital Day: 0    Assessment & Plan  Acute kidney failure (HCC)  Patient found to have elevated creatinine 1.75 from baseline 1.2-1.3  Likely secondary to dehydration in the setting of profuse diarrhea and poor oral intake  Tested positive for influenza   Continue IV fluid hydration   Replete electrolytes, start bicarb drip for acidosis, recheck BMP and VBG at 1400  Follow up stool studies  Repeat BMP in AM   Advance diet and monitor oral intake   Malignant neoplasm of right lung (HCC)  Known history of lung CA   Chest XR with known lung tumor  Patient notes he does not want treatment for his CA and has been following with palliative care   Not requiring submental oxygen at time of admission, continue monitoring- placed on last night but no documented hypoxia, discontinued today   Hypertension  BP controlled at time of admission   Continue holding valsartan in the setting of TIFFANY  Continue Norvasc  Diarrhea  Patient notes intractable diarrhea over the past few days with associated nausea, cough and fevers  Flu positive   Stool studies pending  IV fluid hydration, switched to bicarb drip   Replete electrolytes  Tolerating clears, advance to surgical soft   Elevated troponin  Troponin elevated at 58 at time of admission  Patient denies any chest pain  Repeat troponin 42, 56  Likely secondary to dehydration and acute viral illness  EKG without acute ST changes  Influenza  Patient tested positive for influenza A  Started on tamiflu   Continue supportive care    VTE Pharmacologic Prophylaxis: VTE Score: 4 Moderate Risk (Score 3-4) - Pharmacological DVT Prophylaxis Ordered: heparin.    Mobility:   Basic Mobility Inpatient Raw Score: 24  JH-HLM Goal: 8: Walk 250 feet or more  JH-HLM Achieved: 6: Walk 10 steps or more  JH-HLM Goal NOT achieved. Continue  with multidisciplinary rounding and encourage appropriate mobility to improve upon -[facility] goals.    Patient Centered Rounds: I performed bedside rounds with nursing staff today.   Discussions with Specialists or Other Care Team Provider: none    Education and Discussions with Family / Patient: Patient declined call to .     Current Length of Stay: 0 day(s)  Current Patient Status: Observation   Certification Statement: The patient will continue to require additional inpatient hospital stay due to TIFFANY with flu   Discharge Plan: Anticipate discharge in 24-48 hrs to home.    Code Status: Level 3 - DNAR and DNI    Subjective   Patient seen and examined at bedside. Notes he is doing better today. Denies SOB at rest, still with cough. Continues to have diarrhea but tolerating diet, would like to try advanced diet today.     Objective :  Temp:  [98 °F (36.7 °C)-101 °F (38.3 °C)] 99 °F (37.2 °C)  HR:  [80-96] 80  BP: (127-154)/(60-69) 133/60  Resp:  [16-20] 18  SpO2:  [88 %-99 %] 96 %  O2 Device: Nasal cannula  Nasal Cannula O2 Flow Rate (L/min):  [2 L/min] 2 L/min    Body mass index is 22.44 kg/m².     Input and Output Summary (last 24 hours):     Intake/Output Summary (Last 24 hours) at 12/29/2024 1016  Last data filed at 12/29/2024 0910  Gross per 24 hour   Intake 1120 ml   Output --   Net 1120 ml       Physical Exam  Vitals reviewed.   Constitutional:       General: He is not in acute distress.  HENT:      Head: Normocephalic and atraumatic.   Eyes:      General: No scleral icterus.     Conjunctiva/sclera: Conjunctivae normal.   Cardiovascular:      Rate and Rhythm: Normal rate and regular rhythm.      Heart sounds: No murmur heard.  Pulmonary:      Effort: Pulmonary effort is normal. No respiratory distress.      Breath sounds: Normal breath sounds.   Abdominal:      General: Bowel sounds are normal. There is no distension.      Palpations: Abdomen is soft.      Tenderness: There is no abdominal  tenderness.   Musculoskeletal:      Cervical back: Neck supple.      Right lower leg: No edema.      Left lower leg: No edema.   Skin:     General: Skin is warm and dry.   Neurological:      Mental Status: He is alert and oriented to person, place, and time.   Psychiatric:         Mood and Affect: Mood normal.         Behavior: Behavior normal.           Lines/Drains:              Lab Results: I have reviewed the following results:   Results from last 7 days   Lab Units 12/29/24  0542 12/28/24  1116   WBC Thousand/uL 9.53 7.48   HEMOGLOBIN g/dL 11.3* 11.8*   HEMATOCRIT % 35.0* 36.7   PLATELETS Thousands/uL 231 231   SEGS PCT %  --  71   LYMPHO PCT %  --  18   MONO PCT %  --  10   EOS PCT %  --  0     Results from last 7 days   Lab Units 12/29/24  0542 12/28/24  1116   SODIUM mmol/L 138 137   POTASSIUM mmol/L 3.4* 3.5   CHLORIDE mmol/L 115* 110*   CO2 mmol/L 14* 20*   BUN mg/dL 17 27*   CREATININE mg/dL 1.35* 1.75*   ANION GAP mmol/L 9 7   CALCIUM mg/dL 8.3* 8.6   ALBUMIN g/dL  --  3.6   TOTAL BILIRUBIN mg/dL  --  0.36   ALK PHOS U/L  --  71   ALT U/L  --  9   AST U/L  --  20   GLUCOSE RANDOM mg/dL 104 102                       Recent Cultures (last 7 days):               Last 24 Hours Medication List:     Current Facility-Administered Medications:     acetaminophen (TYLENOL) tablet 650 mg, Q6H PRN    albuterol (PROVENTIL HFA,VENTOLIN HFA) inhaler 2 puff, Q4H PRN    amLODIPine (NORVASC) tablet 10 mg, Daily    calcium carbonate (TUMS) chewable tablet 1,000 mg, Daily PRN    Cholecalciferol (VITAMIN D3) tablet 1,000 Units, HS    ferrous sulfate tablet 325 mg, HS    heparin (porcine) subcutaneous injection 5,000 Units, Q8H OSWALDO    nicotine (NICODERM CQ) 14 mg/24hr TD 24 hr patch 1 patch, Daily    ondansetron (ZOFRAN) injection 4 mg, Q6H PRN    oseltamivir (TAMIFLU) capsule 30 mg, Q12H OSWALDO    potassium chloride (Klor-Con M20) CR tablet 20 mEq, TID With Meals    pravastatin (PRAVACHOL) tablet 40 mg, Daily With Dinner     simethicone (MYLICON) chewable tablet 80 mg, 4x Daily PRN    sodium bicarbonate 150 mEq in dextrose 5 % 1,000 mL infusion, Continuous, Last Rate: 100 mL/hr (12/29/24 0949)    Administrative Statements   Today, Patient Was Seen By: Kay Triplett PA-C      **Please Note: This note may have been constructed using a voice recognition system.**

## 2024-12-30 VITALS
DIASTOLIC BLOOD PRESSURE: 56 MMHG | WEIGHT: 139 LBS | HEART RATE: 81 BPM | HEIGHT: 66 IN | BODY MASS INDEX: 22.34 KG/M2 | TEMPERATURE: 98 F | OXYGEN SATURATION: 93 % | SYSTOLIC BLOOD PRESSURE: 121 MMHG | RESPIRATION RATE: 20 BRPM

## 2024-12-30 LAB
ANION GAP SERPL CALCULATED.3IONS-SCNC: 8 MMOL/L (ref 4–13)
BUN SERPL-MCNC: 15 MG/DL (ref 5–25)
CALCIUM SERPL-MCNC: 8.4 MG/DL (ref 8.4–10.2)
CHLORIDE SERPL-SCNC: 109 MMOL/L (ref 96–108)
CO2 SERPL-SCNC: 21 MMOL/L (ref 21–32)
CREAT SERPL-MCNC: 1.32 MG/DL (ref 0.6–1.3)
ERYTHROCYTE [DISTWIDTH] IN BLOOD BY AUTOMATED COUNT: 13.5 % (ref 11.6–15.1)
GFR SERPL CREATININE-BSD FRML MDRD: 52 ML/MIN/1.73SQ M
GLUCOSE SERPL-MCNC: 116 MG/DL (ref 65–140)
HCT VFR BLD AUTO: 36.3 % (ref 36.5–49.3)
HGB BLD-MCNC: 12 G/DL (ref 12–17)
MAGNESIUM SERPL-MCNC: 1.8 MG/DL (ref 1.9–2.7)
MCH RBC QN AUTO: 29 PG (ref 26.8–34.3)
MCHC RBC AUTO-ENTMCNC: 33.1 G/DL (ref 31.4–37.4)
MCV RBC AUTO: 88 FL (ref 82–98)
PLATELET # BLD AUTO: 239 THOUSANDS/UL (ref 149–390)
PMV BLD AUTO: 10.2 FL (ref 8.9–12.7)
POTASSIUM SERPL-SCNC: 3.5 MMOL/L (ref 3.5–5.3)
RBC # BLD AUTO: 4.14 MILLION/UL (ref 3.88–5.62)
SODIUM SERPL-SCNC: 138 MMOL/L (ref 135–147)
WBC # BLD AUTO: 7.36 THOUSAND/UL (ref 4.31–10.16)

## 2024-12-30 PROCEDURE — 85027 COMPLETE CBC AUTOMATED: CPT | Performed by: PHYSICIAN ASSISTANT

## 2024-12-30 PROCEDURE — 99232 SBSQ HOSP IP/OBS MODERATE 35: CPT | Performed by: PHYSICIAN ASSISTANT

## 2024-12-30 PROCEDURE — 83735 ASSAY OF MAGNESIUM: CPT | Performed by: PHYSICIAN ASSISTANT

## 2024-12-30 PROCEDURE — 99239 HOSP IP/OBS DSCHRG MGMT >30: CPT | Performed by: PHYSICIAN ASSISTANT

## 2024-12-30 PROCEDURE — 80048 BASIC METABOLIC PNL TOTAL CA: CPT | Performed by: PHYSICIAN ASSISTANT

## 2024-12-30 RX ORDER — LOPERAMIDE HYDROCHLORIDE 2 MG/1
2 CAPSULE ORAL 4 TIMES DAILY PRN
Status: DISCONTINUED | OUTPATIENT
Start: 2024-12-30 | End: 2024-12-30 | Stop reason: HOSPADM

## 2024-12-30 RX ORDER — LANOLIN ALCOHOL/MO/W.PET/CERES
400 CREAM (GRAM) TOPICAL 2 TIMES DAILY
Status: DISCONTINUED | OUTPATIENT
Start: 2024-12-30 | End: 2024-12-30 | Stop reason: HOSPADM

## 2024-12-30 RX ORDER — IPRATROPIUM BROMIDE AND ALBUTEROL SULFATE 2.5; .5 MG/3ML; MG/3ML
3 SOLUTION RESPIRATORY (INHALATION) ONCE
Status: DISCONTINUED | OUTPATIENT
Start: 2024-12-30 | End: 2024-12-30

## 2024-12-30 RX ADMIN — SODIUM BICARBONATE 100 ML/HR: 84 INJECTION INTRAVENOUS at 02:31

## 2024-12-30 RX ADMIN — Medication 400 MG: at 08:25

## 2024-12-30 RX ADMIN — HEPARIN SODIUM 5000 UNITS: 5000 INJECTION INTRAVENOUS; SUBCUTANEOUS at 05:21

## 2024-12-30 RX ADMIN — OSELTAMAVIR PHOSPHATE 30 MG: 30 CAPSULE ORAL at 08:24

## 2024-12-30 RX ADMIN — POTASSIUM CHLORIDE 20 MEQ: 1500 TABLET, EXTENDED RELEASE ORAL at 08:25

## 2024-12-30 RX ADMIN — AMLODIPINE BESYLATE 10 MG: 10 TABLET ORAL at 08:25

## 2024-12-30 NOTE — PLAN OF CARE
Problem: Potential for Falls  Goal: Patient will remain free of falls  Description: INTERVENTIONS:  - Educate patient/family on patient safety including physical limitations  - Instruct patient to call for assistance with activity   - Consult OT/PT to assist with strengthening/mobility   - Keep Call bell within reach  - Keep bed low and locked with side rails adjusted as appropriate  - Keep care items and personal belongings within reach  - Initiate and maintain comfort rounds  - Make Fall Risk Sign visible to staff  - Offer Toileting every 2 Hours, in advance of need  - Initiate/Maintain bed alarm  - Obtain necessary fall risk management equipment  - Apply yellow socks and bracelet for high fall risk patients  - Consider moving patient to room near nurses station  12/30/2024 1349 by Gila Cano RN  Outcome: Completed  12/30/2024 0937 by Gila Cano RN  Outcome: Progressing     Problem: SAFETY ADULT  Goal: Patient will remain free of falls  Description: INTERVENTIONS:  - Educate patient/family on patient safety including physical limitations  - Instruct patient to call for assistance with activity   - Consult OT/PT to assist with strengthening/mobility   - Keep Call bell within reach  - Keep bed low and locked with side rails adjusted as appropriate  - Keep care items and personal belongings within reach  - Initiate and maintain comfort rounds  - Make Fall Risk Sign visible to staff  - Offer Toileting every 2 Hours, in advance of need  - Initiate/Maintain bed alarm  - Obtain necessary fall risk management equipment  - Apply yellow socks and bracelet for high fall risk patients  - Consider moving patient to room near nurses station  12/30/2024 1349 by Gila Cano RN  Outcome: Completed  12/30/2024 0937 by Gila Cano RN  Outcome: Progressing  Goal: Maintain or return to baseline ADL function  Description: INTERVENTIONS:  -  Assess patient's ability  to carry out ADLs; assess patient's baseline for ADL function and identify physical deficits which impact ability to perform ADLs (bathing, care of mouth/teeth, toileting, grooming, dressing, etc.)  - Assess/evaluate cause of self-care deficits   - Assess range of motion  - Assess patient's mobility; develop plan if impaired  - Assess patient's need for assistive devices and provide as appropriate  - Encourage maximum independence but intervene and supervise when necessary  - Involve family in performance of ADLs  - Assess for home care needs following discharge   - Consider OT consult to assist with ADL evaluation and planning for discharge  - Provide patient education as appropriate  12/30/2024 1349 by Gila Cano RN  Outcome: Completed  12/30/2024 0937 by Gila Cano RN  Outcome: Progressing     Problem: DISCHARGE PLANNING  Goal: Discharge to home or other facility with appropriate resources  Description: INTERVENTIONS:  - Identify barriers to discharge w/patient and caregiver  - Arrange for needed discharge resources and transportation as appropriate  - Identify discharge learning needs (meds, wound care, etc.)  - Arrange for interpretive services to assist at discharge as needed  - Refer to Case Management Department for coordinating discharge planning if the patient needs post-hospital services based on physician/advanced practitioner order or complex needs related to functional status, cognitive ability, or social support system  12/30/2024 1349 by Gila Cano RN  Outcome: Completed  12/30/2024 0937 by Gila Cano RN  Outcome: Progressing     Problem: Knowledge Deficit  Goal: Patient/family/caregiver demonstrates understanding of disease process, treatment plan, medications, and discharge instructions  Description: Complete learning assessment and assess knowledge base.  Interventions:  - Provide teaching at level of understanding  - Provide  teaching via preferred learning methods  12/30/2024 1349 by Gila Cano RN  Outcome: Completed  12/30/2024 0937 by Gila Cano RN  Outcome: Progressing     Problem: RESPIRATORY - ADULT  Goal: Achieves optimal ventilation and oxygenation  Description: INTERVENTIONS:  - Assess for changes in respiratory status  - Assess for changes in mentation and behavior  - Position to facilitate oxygenation and minimize respiratory effort  - Oxygen administered by appropriate delivery if ordered  - Initiate smoking cessation education as indicated  - Encourage broncho-pulmonary hygiene including cough, deep breathe, Incentive Spirometry  - Assess the need for suctioning and aspirate as needed  - Assess and instruct to report SOB or any respiratory difficulty  - Respiratory Therapy support as indicated  12/30/2024 1349 by Gila Cano RN  Outcome: Completed  12/30/2024 0937 by Gila Cano RN  Outcome: Progressing     Problem: GASTROINTESTINAL - ADULT  Goal: Minimal or absence of nausea and/or vomiting  Description: INTERVENTIONS:  - Administer IV fluids if ordered to ensure adequate hydration  - Maintain NPO status until nausea and vomiting are resolved  - Nasogastric tube if ordered  - Administer ordered antiemetic medications as needed  - Provide nonpharmacologic comfort measures as appropriate  - Advance diet as tolerated, if ordered  - Consider nutrition services referral to assist patient with adequate nutrition and appropriate food choices  12/30/2024 1349 by Gila Cano RN  Outcome: Completed  12/30/2024 0937 by Gila Cano RN  Outcome: Progressing  Goal: Maintains or returns to baseline bowel function  Description: INTERVENTIONS:  - Assess bowel function  - Encourage oral fluids to ensure adequate hydration  - Administer IV fluids if ordered to ensure adequate hydration  - Administer ordered medications as needed  -  Encourage mobilization and activity  - Consider nutritional services referral to assist patient with adequate nutrition and appropriate food choices  12/30/2024 1349 by Gila Cano RN  Outcome: Completed  12/30/2024 0937 by Gila Cano RN  Outcome: Progressing  Goal: Maintains adequate nutritional intake  Description: INTERVENTIONS:  - Monitor percentage of each meal consumed  - Identify factors contributing to decreased intake, treat as appropriate  - Assist with meals as needed  - Monitor I&O, weight, and lab values if indicated  - Obtain nutrition services referral as needed  12/30/2024 1349 by Gila Cano RN  Outcome: Completed  12/30/2024 0937 by Gila Cano RN  Outcome: Progressing     Problem: METABOLIC, FLUID AND ELECTROLYTES - ADULT  Goal: Electrolytes maintained within normal limits  Description: INTERVENTIONS:  - Monitor labs and assess patient for signs and symptoms of electrolyte imbalances  - Administer electrolyte replacement as ordered  - Monitor response to electrolyte replacements, including repeat lab results as appropriate  - Instruct patient on fluid and nutrition as appropriate  12/30/2024 1349 by Gila Cano RN  Outcome: Completed  12/30/2024 0937 by Gila Cano RN  Outcome: Progressing  Goal: Fluid balance maintained  Description: INTERVENTIONS:  - Monitor labs   - Monitor I/O and WT  - Instruct patient on fluid and nutrition as appropriate  - Assess for signs & symptoms of volume excess or deficit  12/30/2024 1349 by Gila Cano RN  Outcome: Completed  12/30/2024 0937 by Gila Cano RN  Outcome: Progressing

## 2024-12-30 NOTE — PROGRESS NOTES
Progress Note - Hospitalist   Name: Johnson Ulloa 74 y.o. male I MRN: [id number]  Unit/Bed#: E2 -01 I Date of Admission: 12/28/2024   Date of Service: 12/30/2024 I Hospital Day: 1    Assessment & Plan  Acute kidney failure (HCC)  Patient found to have elevated creatinine 1.75 from baseline 1.2-1.3  Likely secondary to dehydration in the setting of profuse diarrhea and poor oral intake  Tested positive for influenza   Received 2 days of IV fluid hydration   Continue to replete electrolytes   Follow up stool studies- negative  Advance diet and monitor oral intake-tolerating diet  Creatinine stable at 1.32 today   Will discontinue IV fluids today and monitor   Malignant neoplasm of right lung (HCC)  Known history of lung CA   Chest XR with known lung tumor  Patient notes he does not want treatment for his CA and has been following with palliative care   Not requiring submental oxygen   Hypertension  BP controlled at time of admission   Continue holding valsartan in the setting of TIFFANY  Continue Norvasc  Diarrhea  Patient notes intractable diarrhea over the past few days with associated nausea, cough and fevers  Flu positive   Stool studies negative  S/p IV fluid hydration   Continue to replete electrolytes   Tolerating diet   Elevated troponin  Troponin elevated at 58 at time of admission  Patient denies any chest pain  Repeat troponin 42, 56  Likely secondary to dehydration and acute viral illness  EKG without acute ST changes  Influenza  Patient tested positive for influenza A  Started on tamiflu, continue day 3  Continue supportive care    VTE Pharmacologic Prophylaxis: VTE Score: 4 Moderate Risk (Score 3-4) - Pharmacological DVT Prophylaxis Ordered: heparin.    Mobility:   Basic Mobility Inpatient Raw Score: 23  JH-HLM Goal: 7: Walk 25 feet or more  JH-HLM Achieved: 7: Walk 25 feet or more  JH-HLM Goal achieved. Continue to encourage appropriate mobility.    Patient Centered Rounds: I performed bedside rounds  with nursing staff today.   Discussions with Specialists or Other Care Team Provider: none    Current Length of Stay: 1 day(s)  Current Patient Status: Inpatient   Certification Statement: The patient will continue to require additional inpatient hospital stay due to TIFFANY with influenza   Discharge Plan: Anticipate discharge later today or tomorrow to home.    Code Status: Level 3 - DNAR and DNI    Subjective   Patient seen and examined at bedside. Notes he continues to feel unwell today. Unable to eat his breakfast this morning because food was cold. Does report family is bringing him a different meal. He continues to have diarrhea today. Breathing is about at baseline per patient.     Objective :  Temp:  [98 °F (36.7 °C)-98.3 °F (36.8 °C)] 98 °F (36.7 °C)  HR:  [80-81] 81  BP: (121-133)/(56-70) 121/56  Resp:  [18-20] 20  SpO2:  [92 %-93 %] 93 %  O2 Device: None (Room air)    Body mass index is 22.44 kg/m².     Input and Output Summary (last 24 hours):     Intake/Output Summary (Last 24 hours) at 12/30/2024 0839  Last data filed at 12/29/2024 2101  Gross per 24 hour   Intake 600 ml   Output --   Net 600 ml       Physical Exam  Vitals reviewed.   Constitutional:       General: He is not in acute distress.  HENT:      Head: Normocephalic and atraumatic.   Eyes:      General: No scleral icterus.     Conjunctiva/sclera: Conjunctivae normal.   Cardiovascular:      Rate and Rhythm: Normal rate and regular rhythm.      Heart sounds: No murmur heard.  Pulmonary:      Effort: Pulmonary effort is normal. No respiratory distress.   Abdominal:      General: Bowel sounds are normal. There is no distension.      Palpations: Abdomen is soft.      Tenderness: There is no abdominal tenderness.   Musculoskeletal:      Cervical back: Neck supple.      Right lower leg: No edema.      Left lower leg: No edema.   Skin:     General: Skin is warm and dry.   Neurological:      Mental Status: He is alert and oriented to person, place, and  time.   Psychiatric:         Mood and Affect: Mood normal.         Behavior: Behavior normal.           Lines/Drains:              Lab Results: I have reviewed the following results:   Results from last 7 days   Lab Units 12/30/24  0525 12/29/24  0542 12/28/24  1116   WBC Thousand/uL 7.36   < > 7.48   HEMOGLOBIN g/dL 12.0   < > 11.8*   HEMATOCRIT % 36.3*   < > 36.7   PLATELETS Thousands/uL 239   < > 231   SEGS PCT %  --   --  71   LYMPHO PCT %  --   --  18   MONO PCT %  --   --  10   EOS PCT %  --   --  0    < > = values in this interval not displayed.     Results from last 7 days   Lab Units 12/30/24  0525 12/29/24  0542 12/28/24  1116   SODIUM mmol/L 138   < > 137   POTASSIUM mmol/L 3.5   < > 3.5   CHLORIDE mmol/L 109*   < > 110*   CO2 mmol/L 21   < > 20*   BUN mg/dL 15   < > 27*   CREATININE mg/dL 1.32*   < > 1.75*   ANION GAP mmol/L 8   < > 7   CALCIUM mg/dL 8.4   < > 8.6   ALBUMIN g/dL  --   --  3.6   TOTAL BILIRUBIN mg/dL  --   --  0.36   ALK PHOS U/L  --   --  71   ALT U/L  --   --  9   AST U/L  --   --  20   GLUCOSE RANDOM mg/dL 116   < > 102    < > = values in this interval not displayed.                       Recent Cultures (last 7 days):   Results from last 7 days   Lab Units 12/28/24  1239   C DIFF TOXIN B BY PCR  Negative             Last 24 Hours Medication List:     Current Facility-Administered Medications:     acetaminophen (TYLENOL) tablet 650 mg, Q6H PRN    albuterol (PROVENTIL HFA,VENTOLIN HFA) inhaler 2 puff, Q4H PRN    amLODIPine (NORVASC) tablet 10 mg, Daily    calcium carbonate (TUMS) chewable tablet 1,000 mg, Daily PRN    Cholecalciferol (VITAMIN D3) tablet 1,000 Units, HS    ferrous sulfate tablet 325 mg, HS    heparin (porcine) subcutaneous injection 5,000 Units, Q8H OSWALDO    ipratropium-albuterol (DUO-NEB) 0.5-2.5 mg/3 mL inhalation solution 3 mL, Once    magnesium Oxide (MAG-OX) tablet 400 mg, BID    nicotine (NICODERM CQ) 14 mg/24hr TD 24 hr patch 1 patch, Daily    ondansetron (ZOFRAN)  injection 4 mg, Q6H PRN    oseltamivir (TAMIFLU) capsule 30 mg, Q12H OSWALDO    pravastatin (PRAVACHOL) tablet 40 mg, Daily With Dinner    simethicone (MYLICON) chewable tablet 80 mg, 4x Daily PRN    Administrative Statements   Today, Patient Was Seen By: Kay Triplett PA-C      **Please Note: This note may have been constructed using a voice recognition system.**

## 2024-12-30 NOTE — RESTORATIVE TECHNICIAN NOTE
Restorative Technician Note      Patient Name: Johnson Ulloa     Restorative Tech Visit Date: 12/30/24  Note Type: Mobility  Patient Position Upon Consult: Supine  Activity Performed: Ambulated; Range of motion  Patient Position at End of Consult: All needs within reach; Bedside chair

## 2024-12-30 NOTE — DISCHARGE SUMMARY
Discharge Summary - Hospitalist   Name: Johnson Ulloa 74 y.o. male I MRN: [id number]  Unit/Bed#: E2 -01 I Date of Admission: 12/28/2024   Date of Service: 12/30/2024 I Hospital Day: 1     Assessment & Plan  Acute kidney failure (HCC)  Patient found to have elevated creatinine 1.75 from baseline 1.2-1.3  Likely secondary to dehydration in the setting of profuse diarrhea and poor oral intake  Tested positive for influenza   Received 2 days of IV fluid hydration   Continue to replete electrolytes   Follow up stool studies- negative  Advance diet and monitor oral intake-tolerating diet  Creatinine stable at 1.32 today   Will discontinue IV fluids today and monitor   Malignant neoplasm of right lung (HCC)  Known history of lung CA   Chest XR with known lung tumor  Patient notes he does not want treatment for his CA and has been following with palliative care   Not requiring submental oxygen   Hypertension  BP controlled at time of admission   Continue holding valsartan in the setting of TIFFANY  Continue Norvasc  Diarrhea  Patient notes intractable diarrhea over the past few days with associated nausea, cough and fevers  Flu positive   Stool studies negative  S/p IV fluid hydration   Continue to replete electrolytes   Tolerating diet   Elevated troponin  Troponin elevated at 58 at time of admission  Patient denies any chest pain  Repeat troponin 42, 56  Likely secondary to dehydration and acute viral illness  EKG without acute ST changes  Influenza  Patient tested positive for influenza A  Started on tamiflu, continue day 3  Continue supportive care     Medical Problems       Resolved Problems  Date Reviewed: 12/28/2024   None       Discharging Physician / Practitioner: Kay Triplett PA-C  PCP: Albertina Sandhu MD  Admission Date:   Admission Orders (From admission, onward)       Ordered        12/29/24 1200  INPATIENT ADMISSION  Once            12/28/24 1321  Place in Observation  Once                           Discharge Date: 12/30/24    Consultations During Hospital Stay:  None    Procedures Performed:   XR chest 1 view portable  Result Date: 12/28/2024  Impression: No acute cardiopulmonary disease. Redemonstration of peripheral right upper lobe tumor.    Significant Findings / Test Results:   See above    Incidental Findings:   Peripheral right upper lobe tumor      Test Results Pending at Discharge (will require follow up):   None     Outpatient Tests Requested:  PCP follow-up    Complications: None    Reason for Admission: Diarrhea and TIFFANY    Hospital Course:   Johnson Ulloa is a 74 y.o. male patient who originally presented to the hospital on 12/28/2024 due to diarrhea.  He tested positive for influenza.  He was hydrated aggressive with IV fluids for TIFFANY.  He was started on a clear liquid diet advanced to regular at time of discharge.  He noted improvement in his appetite and stool output during hospital stay.  He received Tamiflu while inpatient.  He remained stable on room air at time of discharge and was cleared for discharge with plan for PCP follow-up.      Please see above list of diagnoses and related plan for additional information.     Condition at Discharge: stable    Discussion with Family: Updated  (daughter) via phone.    Discharge instructions/Information to patient and family:   See after visit summary for information provided to patient and family.      Provisions for Follow-Up Care:  See after visit summary for information related to follow-up care and any pertinent home health orders.      Mobility at time of Discharge:   Basic Mobility Inpatient Raw Score: 22  JH-HLM Goal: 7: Walk 25 feet or more  JH-HLM Achieved: 7: Walk 25 feet or more  HLM Goal achieved. Continue to encourage appropriate mobility.     Disposition:   Home    Planned Readmission: None    Discharge Medications:  See after visit summary for reconciled discharge medications provided to patient and/or family.       Administrative Statements   Discharge Statement:  I have spent a total time of 35 minutes in caring for this patient on the day of the visit/encounter. .    **Please Note: This note may have been constructed using a voice recognition system**

## 2025-01-23 ENCOUNTER — TELEPHONE (OUTPATIENT)
Dept: PALLIATIVE MEDICINE | Facility: CLINIC | Age: 75
End: 2025-01-23

## 2025-01-23 NOTE — TELEPHONE ENCOUNTER
Patient called confused about scheduled appointments. Per patient he received a notification that he has another appointment scheduled with palliative care on 2/12. Advised patient that follow up appointment on 2/12 is with the nurse. Patient is requesting to have nurse visit changed to 2/3 with  if possible.     Per patient normally he always see's nurse and provider same day.     Please call patient.       Thank you!

## 2025-01-28 PROBLEM — J11.1 INFLUENZA: Status: RESOLVED | Noted: 2024-12-29 | Resolved: 2025-01-28

## 2025-02-03 ENCOUNTER — TELEPHONE (OUTPATIENT)
Age: 75
End: 2025-02-03

## 2025-02-03 ENCOUNTER — OFFICE VISIT (OUTPATIENT)
Dept: PALLIATIVE MEDICINE | Facility: CLINIC | Age: 75
End: 2025-02-03
Payer: COMMERCIAL

## 2025-02-03 VITALS
HEART RATE: 80 BPM | BODY MASS INDEX: 22.44 KG/M2 | WEIGHT: 139 LBS | RESPIRATION RATE: 18 BRPM | DIASTOLIC BLOOD PRESSURE: 64 MMHG | OXYGEN SATURATION: 95 % | TEMPERATURE: 97.8 F | SYSTOLIC BLOOD PRESSURE: 130 MMHG

## 2025-02-03 DIAGNOSIS — C34.91 MALIGNANT NEOPLASM OF RIGHT LUNG, UNSPECIFIED PART OF LUNG (HCC): ICD-10-CM

## 2025-02-03 DIAGNOSIS — Z71.89 ADVANCED CARE PLANNING/COUNSELING DISCUSSION: Primary | ICD-10-CM

## 2025-02-03 PROCEDURE — 99213 OFFICE O/P EST LOW 20 MIN: CPT | Performed by: INTERNAL MEDICINE

## 2025-02-03 NOTE — PROGRESS NOTES
Name: Johnson Ulloa      : 1950      MRN: [id number]  Encounter Provider: Rekha Gonzales MD  Encounter Date: 2/3/2025   [facility]  :  Assessment & Plan  Advanced care planning/counseling discussion  He voiced decision to transition to hospice at this time  [facility] a [facility]. Will place  He completed his  ACP docs - he names his children as his POAs. This was scanned in his chart. Original and 3 copies provided to them for safekeeping.   Orders:    Ambulatory Referral to Hospice; Future    Malignant neoplasm of right lung, unspecified part of lung (HCC)  Still not interested in further cancer cares  Denies any pain  Since last visit, however, reports increasing fatigue, weakness, poor appetite, weight loss.   Given above, seems appropriate for hospice. He is also now ready to transition to [facility] on home hospice. Will place referral.   Orders:    Ambulatory Referral to Hospice; Future      Follow up  No follow up, transitioning to hospice    Decisional apparatus: Patient is competent on my exam today. If competence is lost, patient's substitute decision maker would default to wife by [place] Act 169.     PDMP Review: I have reviewed the patient's controlled substance dispensing history in the Prescription Drug Monitoring Program in compliance with the [facility] regulations before prescribing any controlled substances.    2024 1 Oxycodone-Acetaminophen 5-325 90.00 30 Eb Michi [id number] Vet (0043) 0 22.50 [facility] [place]       History of Present Illness   Johnson Ulloa is a 75 y.o. male with advanced COPD, PAD, CKD, nonrheumatic aortic valve stenosis, and squamous cell lung cancer. He was diagnosed with biopsy-proven SCC in 2023. PET-CT on 2023 showed FDG avid right upper lobe lesion in keeping with biopsy-proven right upper lobe squamous cell carcinoma and right hilar and mediastinal FDG avid williams  disease. He saw oncology and recommended concurrent radiation/chemo followed by durvalumab but he declined treatments. His latest CT chest on 9/2024 showed significant interval progression of right upper lobe malignancy and interval progression of mediastinal adenopathy. [facility] referred [facility] to palliative medicine for goals of care.    Since last visit, he reports increasing fatigue, weakness, poor appetite and weight loss. No pain. He reports thinking about it further and is not ready to transition to home hospice. He already discussed this apparently with the [facility] who may need a referral placed. I will place referral.     Pertinent Medical History   COPD, PAD, aortic valve stenosis      Current Outpatient Medications on File Prior to Visit   Medication Sig Dispense Refill    albuterol (PROVENTIL HFA,VENTOLIN HFA) 90 mcg/act inhaler INHALE 2 PUFFS BY MOUTH EVERY FOUR HOURS HRLY AS NEEDED FOR BREATHING      amLODIPine (NORVASC) 10 mg tablet Take 10 mg by mouth daily      cholecalciferol (VITAMIN D3) 1,000 units tablet Take 1,000 Units by mouth daily at bedtime      ferrous sulfate 325 (65 Fe) mg tablet Take 325 mg by mouth daily at bedtime      losartan (COZAAR) 100 MG tablet Take 100 mg by mouth daily      pravastatin (PRAVACHOL) 80 mg tablet TAKE 40MG (ONE-HALF TABLET) BY MOUTH EVERY DAY AT 5 PM FOR CHOLESTEROL       No current facility-administered medications on file prior to visit.      Social History     Tobacco Use    Smoking status: Every Day     Current packs/day: 1.00     Average packs/day: 1 pack/day for 60.0 years (60.0 ttl pk-yrs)     Types: Cigarettes    Smokeless tobacco: Never    Tobacco comments:     One cigarette at 0400 5/8   Vaping Use    Vaping status: Never Used   Substance and Sexual Activity    Alcohol use: Yes     Alcohol/week: 6.0 standard drinks of alcohol     Types: 6 Cans of beer per week    Drug use: Never    Sexual activity: Not Currently        Objective   /64 (BP  Location: Left arm, Patient Position: Sitting, Cuff Size: Standard)   Pulse 80   Temp 97.8 °F (36.6 °C) (Temporal)   Resp 18   Wt 63 kg (139 lb)   SpO2 95%   BMI 22.44 kg/m²     Physical Exam  Constitutional:       General: He is not in acute distress.     Appearance: He is ill-appearing. He is not toxic-appearing or diaphoretic.      Comments: Appears as stated age, chronically ill looking, comfortable, stable, pleasant   HENT:      Head: Normocephalic and atraumatic.   Eyes:      General: No scleral icterus.        Right eye: No discharge.         Left eye: No discharge.   Pulmonary:      Effort: Pulmonary effort is normal. No respiratory distress.      Comments: On RA  Abdominal:      General: Abdomen is flat. There is no distension.   Musculoskeletal:         General: No swelling.   Skin:     General: Skin is warm and dry.      Coloration: Skin is not jaundiced or pale.   Neurological:      General: No focal deficit present.      Mental Status: He is alert and oriented to person, place, and time. Mental status is at baseline.   Psychiatric:         Mood and Affect: Mood normal.         Behavior: Behavior normal.         Thought Content: Thought content normal.         Judgment: Judgment normal.         Recent labs:  Lab Results   Component Value Date/Time    SODIUM 138 12/30/2024 05:25 AM    SODIUM 137 03/18/2021 08:47 AM    K 3.5 12/30/2024 05:25 AM    K 3.8 03/18/2021 08:47 AM    BUN 15 12/30/2024 05:25 AM    BUN 22 03/18/2021 08:47 AM    CREATININE 1.32 (H) 12/30/2024 05:25 AM    CREATININE 1.19 03/18/2021 08:47 AM    GLUC 116 12/30/2024 05:25 AM    GLUC 104 (H) 03/18/2021 08:47 AM    CALCIUM 8.4 12/30/2024 05:25 AM    CALCIUM 9.6 03/18/2021 08:47 AM    AST 20 12/28/2024 11:16 AM    ALT 9 12/28/2024 11:16 AM    ALB 3.6 12/28/2024 11:16 AM    TP 7.4 12/28/2024 11:16 AM    EGFR 52 12/30/2024 05:25 AM     Lab Results   Component Value Date/Time    HGB 12.0 12/30/2024 05:25 AM    WBC 7.36 12/30/2024 05:25  AM     12/30/2024 05:25 AM    INR 1.05 02/26/2023 11:36 AM    INR 0.9 03/18/2021 08:47 AM    PTT 32 02/26/2023 11:36 AM     Lab Results   Component Value Date/Time    STEPHANIE 2.349 08/29/2019 08:48 AM       Recent Imaging:  Procedure: XR chest 1 view portable  Result Date: 12/28/2024  Narrative: XR CHEST PORTABLE INDICATION: SOB. History of lung cancer. COMPARISON: Chest CT 9/3/2024, CXR 5/10/2023. FINDINGS: Redemonstration of 3.7 cm peripheral right upper lobe tumor. No acute disease. No pneumothorax or pleural effusion. Normal cardiomediastinal silhouette. Bones are unremarkable for age. Normal upper abdomen.     Impression: No acute cardiopulmonary disease. Redemonstration of peripheral right upper lobe tumor. Workstation performed: [id number]       Administrative Statements   I have spent a total time of 20 minutes in caring for this patient on the day of the visit/encounter including Diagnostic results, Prognosis, Risks and benefits of tx options, Instructions for management, Patient and family education, Importance of tx compliance, Risk factor reductions, Impressions, Counseling / Coordination of care, Documenting in the medical record, Reviewing / ordering tests, medicine, procedures  , and Obtaining or reviewing history  . Topics discussed with the patient / family include symptom assessment and management, psychosocial support, advanced directives, goals of care, hospice services, supportive listening, and anticipatory guidance.    Rekha Gonzales MD  Palliative Medicine & Supportive Care  Internal Medicine  Available via [employer] Text  Office: [phone]  Fax: [phone]

## 2025-02-03 NOTE — TELEPHONE ENCOUNTER
Left message for Jhonny Lares ([facility] Manager) of [facility]  to inform her of Hospice referral that was placed for pt.

## 2025-02-03 NOTE — ASSESSMENT & PLAN NOTE
He voiced decision to transition to hospice at this time  He already reached out to the [facility], [facility] probably needs a referral. Will place  He completed his  ACP docs - he names his children as his POAs. This was scanned in his chart. Original and 3 copies provided to them for safekeeping.   Orders:    Ambulatory Referral to Hospice; Future

## 2025-02-03 NOTE — ASSESSMENT & PLAN NOTE
Still not interested in further cancer cares  Denies any pain  Since last visit, however, reports increasing fatigue, weakness, poor appetite, weight loss.   Given above, seems appropriate for hospice. He is also now ready to transition to [facility] on home hospice. Will place referral.   Orders:    Ambulatory Referral to Hospice; Future

## 2025-02-03 NOTE — TELEPHONE ENCOUNTER
[facility] calling to inform Dr Fulton: for a patient that has [facility] the referring office has to contact [facility] prior to referring to the hospice services as they cannot do anything since VA will decide who is preferred for hospice. They have the referral and will wait on the go ahead from the [facility]

## 2025-02-05 ENCOUNTER — HOSPICE ADMISSION (OUTPATIENT)
Dept: HOME HOSPICE | Facility: HOSPICE | Age: 75
End: 2025-02-05
Payer: COMMERCIAL

## 2025-02-05 NOTE — TELEPHONE ENCOUNTER
Left message for Jhonny Lares (RN Case Manager) of [facility]  to inform her of Hospice referral that was placed for pt. x2

## 2025-02-06 ENCOUNTER — HOME CARE VISIT (OUTPATIENT)
Dept: HOME HEALTH SERVICES | Facility: HOME HEALTHCARE | Age: 75
End: 2025-02-06
Payer: COMMERCIAL

## 2025-02-10 ENCOUNTER — HOME CARE VISIT (OUTPATIENT)
Dept: HOME HEALTH SERVICES | Facility: HOME HEALTHCARE | Age: 75
End: 2025-02-10
Payer: COMMERCIAL

## 2025-02-10 VITALS
HEIGHT: 66 IN | DIASTOLIC BLOOD PRESSURE: 68 MMHG | HEART RATE: 82 BPM | SYSTOLIC BLOOD PRESSURE: 122 MMHG | RESPIRATION RATE: 16 BRPM | TEMPERATURE: 97.7 F | WEIGHT: 141 LBS | BODY MASS INDEX: 22.66 KG/M2 | OXYGEN SATURATION: 98 %

## 2025-02-10 PROCEDURE — G0299 HHS/HOSPICE OF RN EA 15 MIN: HCPCS

## 2025-02-10 PROCEDURE — 10330057 MEDICATION, GENERAL

## 2025-02-11 ENCOUNTER — HOME CARE VISIT (OUTPATIENT)
Dept: HOME HEALTH SERVICES | Facility: HOME HEALTHCARE | Age: 75
End: 2025-02-11
Payer: COMMERCIAL

## 2025-02-11 PROCEDURE — G0155 HHCP-SVS OF CSW,EA 15 MIN: HCPCS

## 2025-02-12 ENCOUNTER — HOME CARE VISIT (OUTPATIENT)
Dept: HOME HOSPICE | Facility: HOSPICE | Age: 75
End: 2025-02-12
Payer: COMMERCIAL

## 2025-02-17 ENCOUNTER — HOME CARE VISIT (OUTPATIENT)
Dept: HOME HEALTH SERVICES | Facility: HOME HEALTHCARE | Age: 75
End: 2025-02-17
Payer: COMMERCIAL

## 2025-02-17 VITALS
DIASTOLIC BLOOD PRESSURE: 72 MMHG | RESPIRATION RATE: 16 BRPM | OXYGEN SATURATION: 98 % | SYSTOLIC BLOOD PRESSURE: 142 MMHG | TEMPERATURE: 97.5 F | HEART RATE: 77 BPM

## 2025-02-17 PROCEDURE — G0299 HHS/HOSPICE OF RN EA 15 MIN: HCPCS

## 2025-02-19 NOTE — ASSESSMENT & PLAN NOTE
Hold Plavix and Pletal with GI bleed RTC 4 weeks March with labs.  I will have patient to do the blood work today [place]

## 2025-02-24 ENCOUNTER — HOME CARE VISIT (OUTPATIENT)
Dept: HOME HOSPICE | Facility: HOSPICE | Age: 75
End: 2025-02-24
Payer: COMMERCIAL

## 2025-02-24 ENCOUNTER — HOME CARE VISIT (OUTPATIENT)
Dept: HOME HEALTH SERVICES | Facility: HOME HEALTHCARE | Age: 75
End: 2025-02-24
Payer: COMMERCIAL

## 2025-02-24 VITALS
HEART RATE: 73 BPM | DIASTOLIC BLOOD PRESSURE: 78 MMHG | TEMPERATURE: 97.7 F | RESPIRATION RATE: 16 BRPM | SYSTOLIC BLOOD PRESSURE: 148 MMHG | OXYGEN SATURATION: 98 %

## 2025-02-24 PROCEDURE — G0299 HHS/HOSPICE OF RN EA 15 MIN: HCPCS

## 2025-03-03 ENCOUNTER — HOME CARE VISIT (OUTPATIENT)
Dept: HOME HEALTH SERVICES | Facility: HOME HEALTHCARE | Age: 75
End: 2025-03-03
Payer: COMMERCIAL

## 2025-03-03 VITALS
DIASTOLIC BLOOD PRESSURE: 72 MMHG | RESPIRATION RATE: 16 BRPM | SYSTOLIC BLOOD PRESSURE: 154 MMHG | TEMPERATURE: 97.3 F | HEART RATE: 80 BPM

## 2025-03-03 PROCEDURE — G0299 HHS/HOSPICE OF RN EA 15 MIN: HCPCS

## 2025-03-10 ENCOUNTER — HOME CARE VISIT (OUTPATIENT)
Dept: HOME HEALTH SERVICES | Facility: HOME HEALTHCARE | Age: 75
End: 2025-03-10
Payer: COMMERCIAL

## 2025-03-10 VITALS
DIASTOLIC BLOOD PRESSURE: 68 MMHG | HEART RATE: 83 BPM | RESPIRATION RATE: 16 BRPM | TEMPERATURE: 97.5 F | SYSTOLIC BLOOD PRESSURE: 160 MMHG | OXYGEN SATURATION: 98 %

## 2025-03-10 PROCEDURE — G0299 HHS/HOSPICE OF RN EA 15 MIN: HCPCS

## 2025-03-11 ENCOUNTER — HOME CARE VISIT (OUTPATIENT)
Dept: HOME HOSPICE | Facility: HOSPICE | Age: 75
End: 2025-03-11
Payer: COMMERCIAL

## 2025-03-17 ENCOUNTER — HOME CARE VISIT (OUTPATIENT)
Dept: HOME HEALTH SERVICES | Facility: HOME HEALTHCARE | Age: 75
End: 2025-03-17
Payer: COMMERCIAL

## 2025-03-17 VITALS
HEART RATE: 75 BPM | TEMPERATURE: 97.3 F | SYSTOLIC BLOOD PRESSURE: 140 MMHG | OXYGEN SATURATION: 98 % | DIASTOLIC BLOOD PRESSURE: 58 MMHG | RESPIRATION RATE: 16 BRPM

## 2025-03-17 PROCEDURE — G0299 HHS/HOSPICE OF RN EA 15 MIN: HCPCS

## 2025-03-24 ENCOUNTER — HOME CARE VISIT (OUTPATIENT)
Dept: HOME HEALTH SERVICES | Facility: HOME HEALTHCARE | Age: 75
End: 2025-03-24
Payer: COMMERCIAL

## 2025-03-24 VITALS
HEART RATE: 68 BPM | DIASTOLIC BLOOD PRESSURE: 60 MMHG | TEMPERATURE: 97.3 F | SYSTOLIC BLOOD PRESSURE: 132 MMHG | OXYGEN SATURATION: 98 % | RESPIRATION RATE: 16 BRPM

## 2025-03-24 PROCEDURE — G0299 HHS/HOSPICE OF RN EA 15 MIN: HCPCS

## 2025-03-25 ENCOUNTER — HOME CARE VISIT (OUTPATIENT)
Dept: HOME HOSPICE | Facility: HOSPICE | Age: 75
End: 2025-03-25
Payer: COMMERCIAL

## 2025-03-31 ENCOUNTER — HOME CARE VISIT (OUTPATIENT)
Dept: HOME HEALTH SERVICES | Facility: HOME HEALTHCARE | Age: 75
End: 2025-03-31
Payer: COMMERCIAL

## 2025-03-31 VITALS
OXYGEN SATURATION: 99 % | TEMPERATURE: 97.3 F | WEIGHT: 144.5 LBS | BODY MASS INDEX: 23.32 KG/M2 | SYSTOLIC BLOOD PRESSURE: 122 MMHG | DIASTOLIC BLOOD PRESSURE: 64 MMHG | HEART RATE: 56 BPM | RESPIRATION RATE: 16 BRPM

## 2025-03-31 PROCEDURE — G0299 HHS/HOSPICE OF RN EA 15 MIN: HCPCS

## 2025-04-07 ENCOUNTER — HOME CARE VISIT (OUTPATIENT)
Dept: HOME HEALTH SERVICES | Facility: HOME HEALTHCARE | Age: 75
End: 2025-04-07
Payer: COMMERCIAL

## 2025-04-07 VITALS
DIASTOLIC BLOOD PRESSURE: 62 MMHG | OXYGEN SATURATION: 97 % | HEART RATE: 76 BPM | SYSTOLIC BLOOD PRESSURE: 118 MMHG | RESPIRATION RATE: 16 BRPM | TEMPERATURE: 97.3 F

## 2025-04-07 PROCEDURE — G0299 HHS/HOSPICE OF RN EA 15 MIN: HCPCS

## 2025-04-09 ENCOUNTER — HOME CARE VISIT (OUTPATIENT)
Dept: HOME HOSPICE | Facility: HOSPICE | Age: 75
End: 2025-04-09
Payer: COMMERCIAL

## 2025-04-11 ENCOUNTER — HOME CARE VISIT (OUTPATIENT)
Dept: HOME HOSPICE | Facility: HOSPICE | Age: 75
End: 2025-04-11
Payer: COMMERCIAL

## 2025-04-14 ENCOUNTER — HOME CARE VISIT (OUTPATIENT)
Dept: HOME HEALTH SERVICES | Facility: HOME HEALTHCARE | Age: 75
End: 2025-04-14
Payer: COMMERCIAL

## 2025-04-14 VITALS
TEMPERATURE: 97.5 F | SYSTOLIC BLOOD PRESSURE: 128 MMHG | DIASTOLIC BLOOD PRESSURE: 68 MMHG | RESPIRATION RATE: 16 BRPM | OXYGEN SATURATION: 98 % | HEART RATE: 65 BPM

## 2025-04-14 PROCEDURE — G0299 HHS/HOSPICE OF RN EA 15 MIN: HCPCS

## 2025-04-21 ENCOUNTER — HOME CARE VISIT (OUTPATIENT)
Dept: HOME HEALTH SERVICES | Facility: HOME HEALTHCARE | Age: 75
End: 2025-04-21
Payer: COMMERCIAL

## 2025-04-21 VITALS — SYSTOLIC BLOOD PRESSURE: 124 MMHG | HEART RATE: 66 BPM | DIASTOLIC BLOOD PRESSURE: 62 MMHG

## 2025-04-21 PROCEDURE — G0299 HHS/HOSPICE OF RN EA 15 MIN: HCPCS

## 2025-04-23 ENCOUNTER — HOME CARE VISIT (OUTPATIENT)
Dept: HOME HOSPICE | Facility: HOSPICE | Age: 75
End: 2025-04-23
Payer: COMMERCIAL

## 2025-04-28 ENCOUNTER — HOME CARE VISIT (OUTPATIENT)
Dept: HOME HEALTH SERVICES | Facility: HOME HEALTHCARE | Age: 75
End: 2025-04-28
Payer: COMMERCIAL

## 2025-04-28 VITALS
OXYGEN SATURATION: 99 % | BODY MASS INDEX: 23.26 KG/M2 | TEMPERATURE: 97.2 F | DIASTOLIC BLOOD PRESSURE: 76 MMHG | RESPIRATION RATE: 20 BRPM | HEART RATE: 61 BPM | SYSTOLIC BLOOD PRESSURE: 164 MMHG | WEIGHT: 144.13 LBS

## 2025-04-28 PROCEDURE — G0299 HHS/HOSPICE OF RN EA 15 MIN: HCPCS

## 2025-04-29 ENCOUNTER — HOME CARE VISIT (OUTPATIENT)
Dept: HOME HOSPICE | Facility: HOSPICE | Age: 75
End: 2025-04-29
Payer: COMMERCIAL

## 2025-05-05 ENCOUNTER — HOME CARE VISIT (OUTPATIENT)
Dept: HOME HEALTH SERVICES | Facility: HOME HEALTHCARE | Age: 75
End: 2025-05-05
Payer: COMMERCIAL

## 2025-05-05 VITALS
TEMPERATURE: 97.4 F | HEART RATE: 84 BPM | DIASTOLIC BLOOD PRESSURE: 78 MMHG | OXYGEN SATURATION: 98 % | SYSTOLIC BLOOD PRESSURE: 146 MMHG | RESPIRATION RATE: 18 BRPM

## 2025-05-05 PROCEDURE — G0299 HHS/HOSPICE OF RN EA 15 MIN: HCPCS

## 2025-05-12 ENCOUNTER — HOME CARE VISIT (OUTPATIENT)
Dept: HOME HEALTH SERVICES | Facility: HOME HEALTHCARE | Age: 75
End: 2025-05-12
Payer: COMMERCIAL

## 2025-05-12 VITALS
SYSTOLIC BLOOD PRESSURE: 144 MMHG | DIASTOLIC BLOOD PRESSURE: 70 MMHG | RESPIRATION RATE: 20 BRPM | HEART RATE: 66 BPM | TEMPERATURE: 97.8 F | OXYGEN SATURATION: 95 %

## 2025-05-12 PROCEDURE — G0299 HHS/HOSPICE OF RN EA 15 MIN: HCPCS

## 2025-05-15 ENCOUNTER — HOME CARE VISIT (OUTPATIENT)
Dept: HOME HOSPICE | Facility: HOSPICE | Age: 75
End: 2025-05-15
Payer: COMMERCIAL

## 2025-05-19 ENCOUNTER — HOME CARE VISIT (OUTPATIENT)
Dept: HOME HEALTH SERVICES | Facility: HOME HEALTHCARE | Age: 75
End: 2025-05-19
Payer: COMMERCIAL

## 2025-05-19 VITALS — DIASTOLIC BLOOD PRESSURE: 50 MMHG | HEART RATE: 72 BPM | SYSTOLIC BLOOD PRESSURE: 132 MMHG | RESPIRATION RATE: 18 BRPM

## 2025-05-19 PROCEDURE — G0299 HHS/HOSPICE OF RN EA 15 MIN: HCPCS

## 2025-05-27 ENCOUNTER — HOME CARE VISIT (OUTPATIENT)
Dept: HOME HEALTH SERVICES | Facility: HOME HEALTHCARE | Age: 75
End: 2025-05-27
Payer: COMMERCIAL

## 2025-05-27 VITALS
OXYGEN SATURATION: 96 % | DIASTOLIC BLOOD PRESSURE: 60 MMHG | SYSTOLIC BLOOD PRESSURE: 140 MMHG | RESPIRATION RATE: 20 BRPM | HEART RATE: 82 BPM | TEMPERATURE: 97.9 F

## 2025-05-27 PROCEDURE — G0299 HHS/HOSPICE OF RN EA 15 MIN: HCPCS

## 2025-05-30 ENCOUNTER — HOME CARE VISIT (OUTPATIENT)
Dept: HOME HOSPICE | Facility: HOSPICE | Age: 75
End: 2025-05-30
Payer: COMMERCIAL

## 2025-06-02 ENCOUNTER — HOME CARE VISIT (OUTPATIENT)
Dept: HOME HEALTH SERVICES | Facility: HOME HEALTHCARE | Age: 75
End: 2025-06-02
Payer: COMMERCIAL

## 2025-06-02 VITALS
BODY MASS INDEX: 23.58 KG/M2 | RESPIRATION RATE: 20 BRPM | DIASTOLIC BLOOD PRESSURE: 70 MMHG | WEIGHT: 146.06 LBS | TEMPERATURE: 97.6 F | OXYGEN SATURATION: 98 % | SYSTOLIC BLOOD PRESSURE: 126 MMHG

## 2025-06-02 PROCEDURE — G0299 HHS/HOSPICE OF RN EA 15 MIN: HCPCS

## 2025-06-09 ENCOUNTER — HOSPICE F2F VISIT (OUTPATIENT)
Dept: PALLIATIVE MEDICINE | Facility: HOSPITAL | Age: 75
End: 2025-06-09

## 2025-06-09 ENCOUNTER — HOME CARE VISIT (OUTPATIENT)
Dept: HOME HEALTH SERVICES | Facility: HOME HEALTHCARE | Age: 75
End: 2025-06-09
Payer: COMMERCIAL

## 2025-06-09 VITALS
RESPIRATION RATE: 20 BRPM | TEMPERATURE: 97.9 F | HEART RATE: 83 BPM | DIASTOLIC BLOOD PRESSURE: 64 MMHG | SYSTOLIC BLOOD PRESSURE: 150 MMHG | OXYGEN SATURATION: 97 %

## 2025-06-09 PROCEDURE — G0299 HHS/HOSPICE OF RN EA 15 MIN: HCPCS

## 2025-06-09 NOTE — PROGRESS NOTES
Face-To-Face Recertification Visit - [facility]    Date of Visit: 25     BP: #3 (6/10/25 - 25)  Patient's name: Char Ulloa    Male  : 1950     Age: 75  RN Case Manager: Thi    SOC: 2/10/25    TEAM:  Blue     Code status: DNR/DNI             Hospice Diagnosis: malignant neoplasm of R lung .     Secondary and related comorbid illnesses which include: RUL mass, malignant lymphadenopathy, COPD, mild AS, tobacco use, CKD stage 3, ambulatory dysfunction, debility, deconditioning.     Unrelated comorbidities include: arthritis, iron-deficiency anemia, PVD, HTN, HLD, vitamin D deficiency, diverticular disease, astigmatism, cataracts.    Medication Changes: decadron 2mg daily    Allergies: ASA, PCN, Sulfa, statins      Functional status:  PPS:  50       Nutritional status: LMAC: 23.5 cm - 25      Wgt: no recent weight    Patient requires no  assistance with feeding.    Appetite is fair, eating 2-3 meals plus snacks per day consisting of regular foods.  A meal example is a frozen dinner.        Vital Signs  Continence: continent of bowel and bladder  Mobility: independent  ADLs: minimum assistance required with __6_/6 ADLs which includes ambulation, feeding, dressing, toileting, bathing and transferring.  Sleep: 6 hours  Skin is intact     Physical exam:  GENERAL: elderly male  HEENT:  atraumatic  RESP: CTA  CARDI: RRR  ABDOMEN: Soft, NT/ND +BS  MUSCULOSKELETAL: PASCAL  EXTREMITIES: no edema  NEURO/CONGNITIVE: A&Ox4  PSYCH:appropriate    ATTESTATION OF ENCOUNTER: I attest that I personally made a face-to-face encounter with Donte Ulloa  on 25  and have provided the following clinical findings to the certifying physician for use in determining continued eligibility by the certifying physician for hospice care.      SUMMARY OF COURSE DURING CURRENT BENEFIT PERIOD:  Mr. Donte Ulloa is on hospice service for cancer of the lung. He verbalizes an increase in his shortness of breath, decreased appetite and  increasing fatigue. We discussed utilizing a low dose steroid to help with these symptoms, which he was agreeable. Decadron 2 mg daily was prescribed. His inhaler was transitioned to a duoneb qid prn. LMAC has declined.       ATTESTATION: By this signature, I confirm that I personally composed these clinical findings based on review of this patient's medical record and examination of this patient. KRISTEN Golden

## 2025-06-11 ENCOUNTER — HOME CARE VISIT (OUTPATIENT)
Dept: HOME HOSPICE | Facility: HOSPICE | Age: 75
End: 2025-06-11
Payer: COMMERCIAL

## 2025-06-16 ENCOUNTER — HOME CARE VISIT (OUTPATIENT)
Dept: HOME HEALTH SERVICES | Facility: HOME HEALTHCARE | Age: 75
End: 2025-06-16
Payer: COMMERCIAL

## 2025-06-16 VITALS — HEART RATE: 100 BPM | SYSTOLIC BLOOD PRESSURE: 138 MMHG | RESPIRATION RATE: 20 BRPM | DIASTOLIC BLOOD PRESSURE: 62 MMHG

## 2025-06-16 PROCEDURE — G0299 HHS/HOSPICE OF RN EA 15 MIN: HCPCS

## 2025-06-23 ENCOUNTER — HOME CARE VISIT (OUTPATIENT)
Dept: HOME HEALTH SERVICES | Facility: HOME HEALTHCARE | Age: 75
End: 2025-06-23
Payer: COMMERCIAL

## 2025-06-23 ENCOUNTER — HOME CARE VISIT (OUTPATIENT)
Dept: HOME HOSPICE | Facility: HOSPICE | Age: 75
End: 2025-06-23
Payer: COMMERCIAL

## 2025-06-23 VITALS
RESPIRATION RATE: 16 BRPM | SYSTOLIC BLOOD PRESSURE: 120 MMHG | DIASTOLIC BLOOD PRESSURE: 58 MMHG | OXYGEN SATURATION: 96 % | TEMPERATURE: 97.3 F | HEART RATE: 71 BPM

## 2025-06-23 PROCEDURE — G0299 HHS/HOSPICE OF RN EA 15 MIN: HCPCS

## 2025-06-27 ENCOUNTER — HOME CARE VISIT (OUTPATIENT)
Dept: HOME HOSPICE | Facility: HOSPICE | Age: 75
End: 2025-06-27
Payer: COMMERCIAL

## 2025-06-30 ENCOUNTER — HOME CARE VISIT (OUTPATIENT)
Dept: HOME HEALTH SERVICES | Facility: HOME HEALTHCARE | Age: 75
End: 2025-06-30
Payer: COMMERCIAL

## 2025-06-30 VITALS
HEART RATE: 75 BPM | DIASTOLIC BLOOD PRESSURE: 60 MMHG | SYSTOLIC BLOOD PRESSURE: 110 MMHG | OXYGEN SATURATION: 98 % | TEMPERATURE: 97.4 F | RESPIRATION RATE: 18 BRPM

## 2025-06-30 PROCEDURE — G0299 HHS/HOSPICE OF RN EA 15 MIN: HCPCS

## 2025-07-07 ENCOUNTER — HOME CARE VISIT (OUTPATIENT)
Dept: HOME HEALTH SERVICES | Facility: HOME HEALTHCARE | Age: 75
End: 2025-07-07
Payer: COMMERCIAL

## 2025-07-07 VITALS
DIASTOLIC BLOOD PRESSURE: 68 MMHG | OXYGEN SATURATION: 97 % | HEART RATE: 77 BPM | TEMPERATURE: 97.6 F | SYSTOLIC BLOOD PRESSURE: 144 MMHG | RESPIRATION RATE: 20 BRPM

## 2025-07-07 PROCEDURE — G0299 HHS/HOSPICE OF RN EA 15 MIN: HCPCS

## 2025-07-08 ENCOUNTER — HOME CARE VISIT (OUTPATIENT)
Dept: HOME HEALTH SERVICES | Facility: HOME HEALTHCARE | Age: 75
End: 2025-07-08
Payer: COMMERCIAL

## 2025-07-08 VITALS
HEART RATE: 80 BPM | RESPIRATION RATE: 16 BRPM | OXYGEN SATURATION: 99 % | WEIGHT: 136.13 LBS | BODY MASS INDEX: 21.97 KG/M2 | DIASTOLIC BLOOD PRESSURE: 60 MMHG | SYSTOLIC BLOOD PRESSURE: 136 MMHG

## 2025-07-08 PROCEDURE — G0299 HHS/HOSPICE OF RN EA 15 MIN: HCPCS

## 2025-07-09 ENCOUNTER — HOME CARE VISIT (OUTPATIENT)
Dept: HOME HOSPICE | Facility: HOSPICE | Age: 75
End: 2025-07-09
Payer: COMMERCIAL

## 2025-07-14 ENCOUNTER — HOME CARE VISIT (OUTPATIENT)
Dept: HOME HEALTH SERVICES | Facility: HOME HEALTHCARE | Age: 75
End: 2025-07-14
Payer: COMMERCIAL

## 2025-07-14 VITALS
SYSTOLIC BLOOD PRESSURE: 120 MMHG | RESPIRATION RATE: 20 BRPM | TEMPERATURE: 98.1 F | HEART RATE: 86 BPM | DIASTOLIC BLOOD PRESSURE: 70 MMHG | OXYGEN SATURATION: 97 %

## 2025-07-14 PROCEDURE — G0299 HHS/HOSPICE OF RN EA 15 MIN: HCPCS

## 2025-07-18 ENCOUNTER — HOME CARE VISIT (OUTPATIENT)
Dept: HOME HOSPICE | Facility: HOSPICE | Age: 75
End: 2025-07-18
Payer: COMMERCIAL

## 2025-07-21 ENCOUNTER — HOME CARE VISIT (OUTPATIENT)
Dept: HOME HEALTH SERVICES | Facility: HOME HEALTHCARE | Age: 75
End: 2025-07-21
Payer: COMMERCIAL

## 2025-07-21 ENCOUNTER — HOME CARE VISIT (OUTPATIENT)
Dept: HOME HOSPICE | Facility: HOSPICE | Age: 75
End: 2025-07-21
Payer: COMMERCIAL

## 2025-07-21 VITALS
SYSTOLIC BLOOD PRESSURE: 128 MMHG | DIASTOLIC BLOOD PRESSURE: 70 MMHG | HEART RATE: 100 BPM | OXYGEN SATURATION: 98 % | TEMPERATURE: 97.1 F | RESPIRATION RATE: 16 BRPM

## 2025-07-21 PROCEDURE — G0299 HHS/HOSPICE OF RN EA 15 MIN: HCPCS

## 2025-07-24 ENCOUNTER — HOME CARE VISIT (OUTPATIENT)
Dept: HOME HEALTH SERVICES | Facility: HOME HEALTHCARE | Age: 75
End: 2025-07-24
Payer: COMMERCIAL

## 2025-07-24 VITALS
SYSTOLIC BLOOD PRESSURE: 110 MMHG | OXYGEN SATURATION: 99 % | BODY MASS INDEX: 20.4 KG/M2 | RESPIRATION RATE: 24 BRPM | WEIGHT: 126.38 LBS | DIASTOLIC BLOOD PRESSURE: 66 MMHG | HEART RATE: 95 BPM

## 2025-07-24 DIAGNOSIS — Z51.5 HOSPICE CARE PATIENT: Primary | ICD-10-CM

## 2025-07-24 DIAGNOSIS — R19.7 DIARRHEA, UNSPECIFIED TYPE: ICD-10-CM

## 2025-07-24 PROCEDURE — G0299 HHS/HOSPICE OF RN EA 15 MIN: HCPCS

## 2025-07-24 RX ORDER — DIPHENOXYLATE HYDROCHLORIDE AND ATROPINE SULFATE 2.5; .025 MG/1; MG/1
1 TABLET ORAL 4 TIMES DAILY
Qty: 30 TABLET | Refills: 0 | Status: SHIPPED | OUTPATIENT
Start: 2025-07-24

## 2025-07-24 NOTE — TELEPHONE ENCOUNTER
7/24/2025 12:37 PM  [facility] home patient requests refill of CII medication and emergency fill until [facility] order arrives.  Filled electronically via Epic as per [place] State Law.    Requested Prescriptions     Signed Prescriptions Disp Refills    diphenoxylate-atropine (LOMOTIL) 2.5-0.025 mg per tablet 30 tablet 0     Sig: Take 1 tablet by mouth 4 (four) times a day take 1 tablet 4 times a day for next 3 days     Authorizing Provider: REGINA HIGHTOWER CRNP  [facility]  Hospice Answering Service: [phone]

## 2025-07-25 ENCOUNTER — HOME CARE VISIT (OUTPATIENT)
Dept: HOME HOSPICE | Facility: HOSPICE | Age: 75
End: 2025-07-25
Payer: COMMERCIAL

## 2025-07-27 ENCOUNTER — HOME CARE VISIT (OUTPATIENT)
Dept: HOME HOSPICE | Facility: HOSPICE | Age: 75
End: 2025-07-27
Payer: COMMERCIAL

## 2025-07-28 ENCOUNTER — HOME CARE VISIT (OUTPATIENT)
Dept: HOME HEALTH SERVICES | Facility: HOME HEALTHCARE | Age: 75
End: 2025-07-28
Payer: COMMERCIAL

## 2025-07-28 VITALS — BODY MASS INDEX: 20.5 KG/M2 | WEIGHT: 127 LBS

## 2025-07-28 DIAGNOSIS — R42 DIZZINESSES: ICD-10-CM

## 2025-07-28 DIAGNOSIS — Z51.5 HOSPICE CARE PATIENT: Primary | ICD-10-CM

## 2025-07-28 PROCEDURE — 10330063 VN DURABLE MEDICAL EQUIPMENT, SUPPLIES/MEDS

## 2025-07-28 PROCEDURE — G0299 HHS/HOSPICE OF RN EA 15 MIN: HCPCS

## 2025-07-28 RX ORDER — PREDNISONE 10 MG/1
10 TABLET ORAL DAILY
Qty: 7 TABLET | Refills: 0 | Status: SHIPPED | OUTPATIENT
Start: 2025-07-28 | End: 2025-08-04

## 2025-07-29 ENCOUNTER — HOME CARE VISIT (OUTPATIENT)
Dept: HOME HEALTH SERVICES | Facility: HOME HEALTHCARE | Age: 75
End: 2025-07-29
Payer: COMMERCIAL

## 2025-07-29 VITALS
TEMPERATURE: 97.6 F | HEART RATE: 96 BPM | DIASTOLIC BLOOD PRESSURE: 72 MMHG | SYSTOLIC BLOOD PRESSURE: 164 MMHG | RESPIRATION RATE: 16 BRPM

## 2025-07-29 PROCEDURE — G0300 HHS/HOSPICE OF LPN EA 15 MIN: HCPCS

## 2025-07-31 ENCOUNTER — HOME CARE VISIT (OUTPATIENT)
Dept: HOME HOSPICE | Facility: HOSPICE | Age: 75
End: 2025-07-31
Payer: COMMERCIAL

## 2025-08-03 ENCOUNTER — HOME CARE VISIT (OUTPATIENT)
Dept: HOME HEALTH SERVICES | Facility: HOME HEALTHCARE | Age: 75
End: 2025-08-03
Payer: COMMERCIAL

## 2025-08-03 VITALS
SYSTOLIC BLOOD PRESSURE: 144 MMHG | HEART RATE: 102 BPM | RESPIRATION RATE: 20 BRPM | TEMPERATURE: 97.3 F | OXYGEN SATURATION: 98 % | WEIGHT: 122.25 LBS | DIASTOLIC BLOOD PRESSURE: 62 MMHG | BODY MASS INDEX: 19.73 KG/M2

## 2025-08-03 PROCEDURE — G0299 HHS/HOSPICE OF RN EA 15 MIN: HCPCS

## 2025-08-04 ENCOUNTER — HOME CARE VISIT (OUTPATIENT)
Dept: HOME HOSPICE | Facility: HOSPICE | Age: 75
End: 2025-08-04
Payer: COMMERCIAL

## 2025-08-04 ENCOUNTER — HOME CARE VISIT (OUTPATIENT)
Dept: HOME HEALTH SERVICES | Facility: HOME HEALTHCARE | Age: 75
End: 2025-08-04
Payer: COMMERCIAL

## 2025-08-04 PROCEDURE — G0155 HHCP-SVS OF CSW,EA 15 MIN: HCPCS

## 2025-08-05 ENCOUNTER — HOME CARE VISIT (OUTPATIENT)
Dept: HOME HOSPICE | Facility: HOSPICE | Age: 75
End: 2025-08-05
Payer: COMMERCIAL

## 2025-08-06 ENCOUNTER — APPOINTMENT (EMERGENCY)
Dept: CT IMAGING | Facility: HOSPITAL | Age: 75
End: 2025-08-06
Payer: COMMERCIAL

## 2025-08-06 ENCOUNTER — HOSPITAL ENCOUNTER (EMERGENCY)
Facility: HOSPITAL | Age: 75
Discharge: HOME | End: 2025-08-06
Attending: EMERGENCY MEDICINE | Admitting: EMERGENCY MEDICINE
Payer: COMMERCIAL

## 2025-08-06 ENCOUNTER — HOME CARE VISIT (OUTPATIENT)
Dept: HOME HEALTH SERVICES | Facility: HOME HEALTHCARE | Age: 75
End: 2025-08-06
Payer: COMMERCIAL

## 2025-08-06 ENCOUNTER — HOME CARE VISIT (OUTPATIENT)
Dept: HOME HOSPICE | Facility: HOSPICE | Age: 75
End: 2025-08-06
Payer: COMMERCIAL

## 2025-08-06 VITALS
TEMPERATURE: 97.3 F | BODY MASS INDEX: 20.28 KG/M2 | HEART RATE: 93 BPM | OXYGEN SATURATION: 97 % | SYSTOLIC BLOOD PRESSURE: 141 MMHG | RESPIRATION RATE: 20 BRPM | DIASTOLIC BLOOD PRESSURE: 72 MMHG | WEIGHT: 125.66 LBS

## 2025-08-06 DIAGNOSIS — R42 VERTIGO: Primary | ICD-10-CM

## 2025-08-06 LAB
ANION GAP SERPL CALCULATED.3IONS-SCNC: 6 MMOL/L (ref 4–13)
BASOPHILS # BLD AUTO: 0.07 THOUSANDS/ÂΜL (ref 0–0.1)
BASOPHILS NFR BLD AUTO: 1 % (ref 0–1)
BUN SERPL-MCNC: 31 MG/DL (ref 5–25)
CALCIUM SERPL-MCNC: 9.3 MG/DL (ref 8.4–10.2)
CHLORIDE SERPL-SCNC: 109 MMOL/L (ref 96–108)
CO2 SERPL-SCNC: 21 MMOL/L (ref 21–32)
CREAT SERPL-MCNC: 1.4 MG/DL (ref 0.6–1.3)
EOSINOPHIL # BLD AUTO: 0.11 THOUSAND/ÂΜL (ref 0–0.61)
EOSINOPHIL NFR BLD AUTO: 1 % (ref 0–6)
ERYTHROCYTE [DISTWIDTH] IN BLOOD BY AUTOMATED COUNT: 15.1 % (ref 11.6–15.1)
GFR SERPL CREATININE-BSD FRML MDRD: 48 ML/MIN/1.73SQ M
GLUCOSE SERPL-MCNC: 80 MG/DL (ref 65–140)
HCT VFR BLD AUTO: 34.2 % (ref 36.5–49.3)
HGB BLD-MCNC: 11 G/DL (ref 12–17)
IMM GRANULOCYTES # BLD AUTO: 0.1 THOUSAND/UL (ref 0–0.2)
IMM GRANULOCYTES NFR BLD AUTO: 1 % (ref 0–2)
LYMPHOCYTES # BLD AUTO: 2.81 THOUSANDS/ÂΜL (ref 0.6–4.47)
LYMPHOCYTES NFR BLD AUTO: 19 % (ref 14–44)
MCH RBC QN AUTO: 28.1 PG (ref 26.8–34.3)
MCHC RBC AUTO-ENTMCNC: 32.2 G/DL (ref 31.4–37.4)
MCV RBC AUTO: 88 FL (ref 82–98)
MONOCYTES # BLD AUTO: 1.24 THOUSAND/ÂΜL (ref 0.17–1.22)
MONOCYTES NFR BLD AUTO: 8 % (ref 4–12)
NEUTROPHILS # BLD AUTO: 10.4 THOUSANDS/ÂΜL (ref 1.85–7.62)
NEUTS SEG NFR BLD AUTO: 70 % (ref 43–75)
NRBC BLD AUTO-RTO: 0 /100 WBCS
PLATELET # BLD AUTO: 382 THOUSANDS/UL (ref 149–390)
PMV BLD AUTO: 9.6 FL (ref 8.9–12.7)
POTASSIUM SERPL-SCNC: 4.3 MMOL/L (ref 3.5–5.3)
RBC # BLD AUTO: 3.91 MILLION/UL (ref 3.88–5.62)
SODIUM SERPL-SCNC: 136 MMOL/L (ref 135–147)
WBC # BLD AUTO: 14.73 THOUSAND/UL (ref 4.31–10.16)

## 2025-08-06 PROCEDURE — 96374 THER/PROPH/DIAG INJ IV PUSH: CPT

## 2025-08-06 PROCEDURE — 99285 EMERGENCY DEPT VISIT HI MDM: CPT

## 2025-08-06 PROCEDURE — 36415 COLL VENOUS BLD VENIPUNCTURE: CPT | Performed by: EMERGENCY MEDICINE

## 2025-08-06 PROCEDURE — 70498 CT ANGIOGRAPHY NECK: CPT

## 2025-08-06 PROCEDURE — 99284 EMERGENCY DEPT VISIT MOD MDM: CPT | Performed by: EMERGENCY MEDICINE

## 2025-08-06 PROCEDURE — 80048 BASIC METABOLIC PNL TOTAL CA: CPT | Performed by: EMERGENCY MEDICINE

## 2025-08-06 PROCEDURE — 96361 HYDRATE IV INFUSION ADD-ON: CPT

## 2025-08-06 PROCEDURE — 70496 CT ANGIOGRAPHY HEAD: CPT

## 2025-08-06 PROCEDURE — G0299 HHS/HOSPICE OF RN EA 15 MIN: HCPCS

## 2025-08-06 PROCEDURE — 85025 COMPLETE CBC W/AUTO DIFF WBC: CPT | Performed by: EMERGENCY MEDICINE

## 2025-08-06 RX ORDER — DIAZEPAM 5 MG/1
TABLET ORAL
Qty: 20 TABLET | Refills: 0 | Status: SHIPPED | OUTPATIENT
Start: 2025-08-06

## 2025-08-06 RX ORDER — DIAZEPAM 10 MG/2ML
2.5 INJECTION, SOLUTION INTRAMUSCULAR; INTRAVENOUS ONCE
Status: COMPLETED | OUTPATIENT
Start: 2025-08-06 | End: 2025-08-06

## 2025-08-06 RX ADMIN — IOHEXOL 85 ML: 350 INJECTION, SOLUTION INTRAVENOUS at 20:33

## 2025-08-06 RX ADMIN — SODIUM CHLORIDE 500 ML: 0.9 INJECTION, SOLUTION INTRAVENOUS at 20:26

## 2025-08-06 RX ADMIN — DIAZEPAM 2.5 MG: 10 INJECTION, SOLUTION INTRAMUSCULAR; INTRAVENOUS at 19:51

## 2025-08-08 ENCOUNTER — HOME CARE VISIT (OUTPATIENT)
Dept: HOME HEALTH SERVICES | Facility: HOME HEALTHCARE | Age: 75
End: 2025-08-08

## (undated) DEVICE — BAG URINE DRAINAGE 2000ML ANTI RFLX LF

## (undated) DEVICE — BAG URINE DRAINAGE LEG

## (undated) DEVICE — BOWL: 16OZ PEELPOUCH 75/CS 16/PLT: Brand: MEDEGEN MEDICAL PRODUCTS, LLC

## (undated) DEVICE — CATH FOLEY 22FR 5ML 2 WAY SILICONE ELASTIMER

## (undated) DEVICE — PREMIUM DRY TRAY LF: Brand: MEDLINE INDUSTRIES, INC.

## (undated) DEVICE — PACK TUR

## (undated) DEVICE — SCD SEQUENTIAL COMPRESSION COMFORT SLEEVE MEDIUM KNEE LENGTH: Brand: KENDALL SCD

## (undated) DEVICE — UROCATCH BAG

## (undated) DEVICE — STERILE SURGICAL LUBRICANT,  TUBE: Brand: SURGILUBE

## (undated) DEVICE — HEYMAN DILATOR 18 FR

## (undated) DEVICE — INVIEW CLEAR LEGGINGS: Brand: CONVERTORS

## (undated) DEVICE — GLOVE SRG BIOGEL 7.5

## (undated) DEVICE — Device

## (undated) DEVICE — HEYMAN DILATOR 20 FR

## (undated) DEVICE — EXIDINE 4 PCT

## (undated) DEVICE — HEYMAN DILATOR 24 FR

## (undated) DEVICE — TUBING SUCTION 5MM X 12 FT

## (undated) DEVICE — HEYMAN DILATOR 22 FR

## (undated) DEVICE — 4-PORT MANIFOLD: Brand: NEPTUNE 2

## (undated) DEVICE — SYRINGE CATH TIP 50ML